# Patient Record
Sex: MALE | Race: WHITE | NOT HISPANIC OR LATINO | Employment: FULL TIME | URBAN - METROPOLITAN AREA
[De-identification: names, ages, dates, MRNs, and addresses within clinical notes are randomized per-mention and may not be internally consistent; named-entity substitution may affect disease eponyms.]

---

## 2017-02-06 ENCOUNTER — TELEPHONE (OUTPATIENT)
Dept: FAMILY MEDICINE | Facility: CLINIC | Age: 57
End: 2017-02-06

## 2017-02-06 NOTE — LETTER
[address] [address] [phone]  February 13, 2017    Da Mustafa  [address] [facility] [address]      Dear Da,    I care about your health and have reviewed your health plan. I have reviewed your medical conditions, medication list, and lab results and am making recommendations based on this review, to better manage your health.    You are in particular need of attention regarding:  -High Blood Pressure    I am recommending that you:  {recommendations:-schedule a NURSE-ONLY BLOOD PRESSURE APPOINTMENT within the next 1-4 weeks.    Here is a list of Health Maintenance topics that are due now or due soon:  Health Maintenance Due   Topic Date Due     HEPATITIS C SCREENING  09/02/1980     INFLUENZA VACCINE (SYSTEM ASSIGNED)  09/01/2016       Please call us at [phone] (or use [employer]) to address the above recommendations.     Thank you for trusting [facility] and we appreciate the opportunity to serve you.  We look forward to supporting your healthcare needs in the future.    Healthy Regards,    Robel Morris PA-C

## 2017-02-06 NOTE — TELEPHONE ENCOUNTER
Panel Management Review      Patient has the following on his problem list:     Hypertension   Last three blood pressure readings:  BP Readings from Last 3 Encounters:   09/06/16 151/76   10/26/15 132/80   07/27/15 146/98     Blood pressure: Failed    HTN Guidelines:  Age 18-59 BP range:  Less than 140/90  Age 60-85 with Diabetes:  Less than 140/90  Age 60-85 without Diabetes:  less than 150/90      Composite cancer screening  Chart review shows that this patient is due/due soon for the following None  Summary:    Patient is due/failing the following:   BP CHECK    Action needed:   Patient needs nurse only appointment.    Type of outreach:    Phone, left message for patient to call back.     Questions for provider review:    None                                                                                                                                    MIRTHA Crawford       Chart routed to Care Team .

## 2017-05-08 ENCOUNTER — TELEPHONE (OUTPATIENT)
Dept: FAMILY MEDICINE | Facility: CLINIC | Age: 57
End: 2017-05-08

## 2017-05-08 NOTE — TELEPHONE ENCOUNTER
Panel Management Review      Patient has the following on his problem list:     Hypertension   Last three blood pressure readings:  BP Readings from Last 3 Encounters:   09/06/16 151/76   10/26/15 132/80   07/27/15 (!) 146/98     Blood pressure: Passed    HTN Guidelines:  Age 18-59 BP range:  Less than 140/90  Age 60-85 with Diabetes:  Less than 140/90  Age 60-85 without Diabetes:  less than 150/90      Composite cancer screening  Chart review shows that this patient is due/due soon for the following None  Summary:    Patient is due/failing the following:   BP CHECK    Action needed:   Patient needs nurse only appointment.    Type of outreach:    Phone, spoke to patient.  pt states he will stop into phrmacy to have BP check as he is unable to schedule nurse only visit.     Questions for provider review:    None                                                                                                                                    MIRTHA Crawford       Chart routed to Care Team .

## 2017-08-06 DIAGNOSIS — J30.2 SEASONAL ALLERGIC RHINITIS: ICD-10-CM

## 2017-08-06 NOTE — TELEPHONE ENCOUNTER
Pending Prescriptions:                       Disp   Refills    fluticasone (FLONASE) 50 MCG/ACT spray [P*16 mL  3            Sig: SPRAY 2 SPRAYS INTO BOTH NOSTRILS DAILY               Last Written Prescription Date: 9/6/2016  Last Fill Quantity: 16g, # refills: 3    Last Office Visit with [facility], [facility] or [facility] prescribing provider:  9/6/2016Alexandra     Future Office Visit:       Date of Last Asthma Action Plan Letter:   There are no preventive care reminders to display for this patient.   Asthma Control Test: No flowsheet data found.    Date of Last Spirometry Test:   No results found for this or any previous visit.

## 2017-08-08 RX ORDER — FLUTICASONE PROPIONATE 50 MCG
SPRAY, SUSPENSION (ML) NASAL
Qty: 16 ML | Refills: 0 | Status: SHIPPED | OUTPATIENT
Start: 2017-08-08 | End: 2019-04-24

## 2017-08-08 NOTE — TELEPHONE ENCOUNTER
Prescription approved per [facility] Refill Protocol. Per Epic not covered, note to pharmacist:  If not covered, please send list of covered alternates.  Ezio Daigle RN

## 2017-09-11 DIAGNOSIS — I10 ESSENTIAL HYPERTENSION WITH GOAL BLOOD PRESSURE LESS THAN 140/90: ICD-10-CM

## 2017-09-11 NOTE — TELEPHONE ENCOUNTER
Pending Prescriptions:                       Disp   Refills    lisinopril (PRINIVIL/ZESTRIL) 40 MG table*90 tab*3            Sig: TAKE 1 TABLET (40 MG) BY MOUTH DAILY SIG PLEASE           SEE MD      LAST OFFICE VISIT WAS OVER A YEAR AGO 9/6/2016      Last Written Prescription Date: 9/6/2016  Last Fill Quantity: 90, # refills: 3  Last Office Visit with [facility], [facility] or [facility] prescribing provider: 9/6/2016, Alexandra       Potassium   Date Value Ref Range Status   09/06/2016 4.2 3.4 - 5.3 mmol/L Final     Creatinine   Date Value Ref Range Status   09/06/2016 0.81 0.66 - 1.25 mg/dL Final     BP Readings from Last 3 Encounters:   09/06/16 151/76   10/26/15 132/80   07/27/15 (!) 146/98

## 2017-09-13 RX ORDER — LISINOPRIL 40 MG/1
TABLET ORAL
Qty: 90 TABLET | Refills: 0 | Status: SHIPPED | OUTPATIENT
Start: 2017-09-13 | End: 2017-12-27

## 2017-12-21 DIAGNOSIS — I10 ESSENTIAL HYPERTENSION WITH GOAL BLOOD PRESSURE LESS THAN 140/90: ICD-10-CM

## 2017-12-21 NOTE — TELEPHONE ENCOUNTER
Requested Prescriptions   Pending Prescriptions Disp Refills     lisinopril (PRINIVIL/ZESTRIL) 40 MG tablet [Pharmacy Med Name: LISINOPRIL 40 MG TABLET] 90 tablet 0    Last Written Prescription Date:  9/13/17  Last Fill Quantity: 90,  # refills: 0   Last Office Visit with [facility], [facility] or [facility] prescribing provider:  9/6/2016   Future Office Visit:      Sig: TAKE 1 TABLET BY MOUTH DAILY . NEEDS APPOINTMENT FOR REFILL    ACE Inhibitors (Including Combos) Protocol Failed    12/21/2017  9:06 AM       Failed - Blood pressure under 140/90    BP Readings from Last 3 Encounters:   09/06/16 151/76   10/26/15 132/80   07/27/15 (!) 146/98                Failed - Recent or future visit with authorizing provider's specialty    Patient had office visit in the last year or has a visit in the next 30 days with authorizing provider.  See chart review.              Failed - Normal serum creatinine on file in past 12 months    Recent Labs   Lab Test  09/06/16   1010   CR  0.81            Failed - Normal serum potassium on file in past 12 months    Recent Labs   Lab Test  09/06/16   1010   POTASSIUM  4.2            Passed - Patient is age 18 or older

## 2017-12-22 RX ORDER — LISINOPRIL 40 MG/1
TABLET ORAL
Qty: 90 TABLET | Refills: 0 | OUTPATIENT
Start: 2017-12-22

## 2017-12-27 ENCOUNTER — RADIANT APPOINTMENT (OUTPATIENT)
Dept: GENERAL RADIOLOGY | Facility: CLINIC | Age: 57
End: 2017-12-27
Attending: PHYSICIAN ASSISTANT
Payer: COMMERCIAL

## 2017-12-27 ENCOUNTER — OFFICE VISIT (OUTPATIENT)
Dept: FAMILY MEDICINE | Facility: CLINIC | Age: 57
End: 2017-12-27
Payer: COMMERCIAL

## 2017-12-27 VITALS
RESPIRATION RATE: 18 BRPM | HEART RATE: 91 BPM | BODY MASS INDEX: 38.36 KG/M2 | HEIGHT: 76 IN | TEMPERATURE: 98.7 F | DIASTOLIC BLOOD PRESSURE: 86 MMHG | SYSTOLIC BLOOD PRESSURE: 136 MMHG | WEIGHT: 315 LBS

## 2017-12-27 DIAGNOSIS — Z00.00 VISIT FOR PREVENTIVE HEALTH EXAMINATION: Primary | ICD-10-CM

## 2017-12-27 DIAGNOSIS — M79.672 LEFT FOOT PAIN: ICD-10-CM

## 2017-12-27 DIAGNOSIS — I10 ESSENTIAL HYPERTENSION WITH GOAL BLOOD PRESSURE LESS THAN 140/90: ICD-10-CM

## 2017-12-27 DIAGNOSIS — Z12.5 SCREENING FOR PROSTATE CANCER: ICD-10-CM

## 2017-12-27 DIAGNOSIS — M19.072 ARTHRITIS OF LEFT FOOT: ICD-10-CM

## 2017-12-27 LAB
ERYTHROCYTE [DISTWIDTH] IN BLOOD BY AUTOMATED COUNT: 12.4 % (ref 10–15)
HCT VFR BLD AUTO: 41.9 % (ref 40–53)
HGB BLD-MCNC: 14.2 G/DL (ref 13.3–17.7)
MCH RBC QN AUTO: 30.5 PG (ref 26.5–33)
MCHC RBC AUTO-ENTMCNC: 33.9 G/DL (ref 31.5–36.5)
MCV RBC AUTO: 90 FL (ref 78–100)
PLATELET # BLD AUTO: 185 10E9/L (ref 150–450)
RBC # BLD AUTO: 4.65 10E12/L (ref 4.4–5.9)
WBC # BLD AUTO: 7.7 10E9/L (ref 4–11)

## 2017-12-27 PROCEDURE — 99396 PREV VISIT EST AGE 40-64: CPT | Performed by: PHYSICIAN ASSISTANT

## 2017-12-27 PROCEDURE — 36415 COLL VENOUS BLD VENIPUNCTURE: CPT | Performed by: PHYSICIAN ASSISTANT

## 2017-12-27 PROCEDURE — 80061 LIPID PANEL: CPT | Performed by: PHYSICIAN ASSISTANT

## 2017-12-27 PROCEDURE — 85027 COMPLETE CBC AUTOMATED: CPT | Performed by: PHYSICIAN ASSISTANT

## 2017-12-27 PROCEDURE — 80053 COMPREHEN METABOLIC PANEL: CPT | Performed by: PHYSICIAN ASSISTANT

## 2017-12-27 PROCEDURE — 73630 X-RAY EXAM OF FOOT: CPT | Mod: LT

## 2017-12-27 PROCEDURE — 73610 X-RAY EXAM OF ANKLE: CPT | Mod: LT

## 2017-12-27 PROCEDURE — G0103 PSA SCREENING: HCPCS | Performed by: PHYSICIAN ASSISTANT

## 2017-12-27 PROCEDURE — 99213 OFFICE O/P EST LOW 20 MIN: CPT | Mod: 25 | Performed by: PHYSICIAN ASSISTANT

## 2017-12-27 RX ORDER — LISINOPRIL 40 MG/1
TABLET ORAL
Qty: 90 TABLET | Refills: 3 | Status: SHIPPED | OUTPATIENT
Start: 2017-12-27 | End: 2019-01-13

## 2017-12-27 NOTE — NURSING NOTE
"Chief Complaint   Patient presents with     Physical       Initial /90 (BP Location: Right arm, Patient Position: Chair, Cuff Size: Adult Large)  Pulse 91  Temp 98.7  F (37.1  C) (Oral)  Resp 18  Ht 6' 3.75\" (1.924 m)  Wt (!) 439 lb (199.1 kg)  BMI 53.79 kg/m2 Estimated body mass index is 53.79 kg/(m^2) as calculated from the following:    Height as of this encounter: 6' 3.75\" (1.924 m).    Weight as of this encounter: 439 lb (199.1 kg).  Medication Reconciliation: complete    "

## 2017-12-27 NOTE — PROGRESS NOTES
SUBJECTIVE:   CC: Da Mustafa is an 57 year old male who presents for preventative health visit.     Physical   Annual:     Getting at least 3 servings of Calcium per day::  Yes    Bi-annual eye exam::  Yes    Dental care twice a year::  Yes    Sleep apnea or symptoms of sleep apnea::  Excessive snoring    Diet::  Low salt    Frequency of exercise::  1 day/week    Duration of exercise::  Less than 15 minutes    Taking medications regularly::  Yes    Medication side effects::  None    Additional concerns today::  YES              Joint Pain    Onset: months     Description:   Location: left ankle/foot  Character: Sharp and Dull ache    Intensity: mild    Progression of Symptoms: better    Accompanying Signs & Symptoms:  Other symptoms: worse in AM. Improves with movement.     History:   Previous similar pain: no       Precipitating factors:   Trauma or overuse: no     Alleviating factors:  Improved by: exercises    Therapies Tried and outcome: none         Left ankle pain x several months, comes and goes      Today's PHQ-2 Score:   PHQ-2 ( 1999 [employer]) 12/27/2017   Q1: Little interest or pleasure in doing things 0   Q2: Feeling down, depressed or hopeless 0   PHQ-2 Score 0   Q1: Little interest or pleasure in doing things Not at all   Q2: Feeling down, depressed or hopeless Not at all   PHQ-2 Score 0       Abuse: Current or Past(Physical, Sexual or Emotional)- No  Do you feel safe in your environment - Yes    Social History   Substance Use Topics     Smoking status: Never Smoker     Smokeless tobacco: Never Used     Alcohol use Yes      Comment: rare     Alcohol Use 12/27/2017   If you drink alcohol, do you typically have greater than 3 drinks per day OR greater than 7 drinks per week?   No   No flowsheet data found.      Last PSA: No results found for: PSA    Reviewed orders with patient. Reviewed health maintenance and updated orders accordingly - Yes  BP Readings from Last 3 Encounters:   12/27/17 136/86    09/06/16 151/76   10/26/15 132/80    Wt Readings from Last 3 Encounters:   12/27/17 (!) 439 lb (199.1 kg)   09/06/16 (!) 456 lb (206.8 kg)   07/27/15 (!) 460 lb (208.7 kg)                  Patient Active Problem List   Diagnosis     Edema     Family history of diabetes mellitus     Seasonal allergic rhinitis     Hyperlipidemia LDL goal <160     Essential hypertension with goal blood pressure less than 140/90     Arthritis of left foot     Class 3 obesity due to excess calories without serious comorbidity with body mass index (BMI) of 50.0 to 59.9 in adult (H)     Past Surgical History:   Procedure Laterality Date     C NONSPECIFIC PROCEDURE       COLONOSCOPY  12/4/2013    Procedure: COLONOSCOPY;  Colonoscopy  ;  Surgeon: Beny Rich MD;  Location:  GI       Social History   Substance Use Topics     Smoking status: Never Smoker     Smokeless tobacco: Never Used     Alcohol use Yes      Comment: rare     Family History   Problem Relation Age of Onset     CANCER Father          Current Outpatient Prescriptions   Medication Sig Dispense Refill     lisinopril (PRINIVIL/ZESTRIL) 40 MG tablet TAKE 1 TABLET (40 MG) BY MOUTH DAILY SIG PLEASE SEE MD 90 tablet 3     fluticasone (FLONASE) 50 MCG/ACT spray SPRAY 2 SPRAYS INTO BOTH NOSTRILS DAILY 16 mL 0     ORDER FOR DME 1 Units. Extra-large mask with appropos tubing (if needed) for pt use in CPAP machine 1 Units 0     ORDER FOR DME Jobst knee high compression stocking (Cat# [id number])    Flat calf black; Open Toe; XL    30-40mmHg compression (extra firm) 2 each 1     CLARITIN 10 MG OR TABS ONE DAILY 3 MONTHS 3     [DISCONTINUED] lisinopril (PRINIVIL/ZESTRIL) 40 MG tablet TAKE 1 TABLET (40 MG) BY MOUTH DAILY SIG PLEASE SEE MD 90 tablet 0     Allergies   Allergen Reactions     No Known Drug Allergies            Reviewed and updated as needed this visit by clinical staffTobacco  Allergies  Meds  Problems  Med Hx  Surg Hx  Fam Hx  Soc Hx          Reviewed and updated  "as needed this visit by Provider  Allergies  Meds  Problems        Past Medical History:   Diagnosis Date     Class 3 obesity due to excess calories without serious comorbidity with body mass index (BMI) of 50.0 to 59.9 in adult (H) 12/27/2017     Edema      Essential hypertension with goal blood pressure less than 140/90 7/25/2016     Essential hypertension, benign      Hyperlipidemia LDL goal <160 7/28/2015     Hypertension goal BP (blood pressure) < 140/90 4/25/2013     Obesity, unspecified      Seasonal allergic rhinitis 5/19/2014     Sleep apnea       Past Surgical History:   Procedure Laterality Date     C NONSPECIFIC PROCEDURE       COLONOSCOPY  12/4/2013    Procedure: COLONOSCOPY;  Colonoscopy  ;  Surgeon: Beny Rich MD;  Location:  GI       Review of Systems:   C: NEGATIVE for fever, chills, change in weight  I: NEGATIVE for worrisome rashes, moles or lesions  E: NEGATIVE for vision changes or irritation  ENT: NEGATIVE for ear, mouth and throat problems  R: NEGATIVE for significant cough or SOB  CV: NEGATIVE for chest pain, palpitations or peripheral edema  GI: NEGATIVE for nausea, abdominal pain, heartburn, or change in bowel habits   male: negative for dysuria, hematuria, decreased urinary stream, erectile dysfunction, urethral discharge  M: NEGATIVE for significant arthralgias or myalgia  N: NEGATIVE for weakness, dizziness or paresthesias  P: NEGATIVE for changes in mood or affect    OBJECTIVE:   /86  Pulse 91  Temp 98.7  F (37.1  C) (Oral)  Resp 18  Ht 6' 3.75\" (1.924 m)  Wt (!) 439 lb (199.1 kg)  BMI 53.79 kg/m2    Physical Exam  GENERAL APPEARANCE: alert, no distress and obese  EYES: Eyes grossly normal to inspection, PERRL and conjunctivae and sclerae normal  HENT: ear canals and TM's normal and nose and mouth without ulcers or lesions  NECK: no adenopathy, no asymmetry, masses, or scars and thyroid normal to palpation  RESP: lungs clear to auscultation - no rales, rhonchi " or wheezes  CV: regular rates and rhythm, normal S1 S2, no S3 or S4 and no murmur, click or rub  LYMPHATICS: normal ant/post cervical and supraclavicular nodes  ABDOMEN: soft, nontender, without hepatosplenomegaly or masses and bowel sounds normal  MS: extremities normal- no gross deformities noted    L ANKLE  Inspection:Swelling:diffuse   Tender:none   Non-tender:ATFL, lateral malleolus, medial malleolus, deltoid ligament, anterior tib-fib ligament, achilles tendon, distal 3rd fibula shaft, mid-fibula shaft, proximal fibula, distal tibia, 5th metatarsal base  Range of Motion:dorsiflexion:  full, plantarflexion:  full, inversion:  full, eversion:  full  Strength:dorsiflexion:  5/5, plantarflexion:  5/5, inversion: 5/5, eversion:5/5  Special tests:negative anterior drawer, negative talar tilt, negative Lancaster sign    L FOOT  foot exam : Inspection Palpation:   Swelling: medial swelling  Tender::peroneal tendon:  at cuboid  Non-tender:calcaneous , navicular, cuneiform lateral, cuneiform middle, cuneiform medial , metatarsal heads  Range of Motion:flexion of toes:  full, extension of toes  full    SKIN: no suspicious lesions or rashes  NEURO: Normal strength and tone, mentation intact and speech normal  PSYCH: mentation appears normal and affect normal/bright    XRAY right foot and ankle: arthritic changes noted at navicular-cuniform junction. Pending radiology review.     ASSESSMENT/PLAN:   (Z00.00) Visit for preventive health examination  (primary encounter diagnosis)  Comment:   Plan: Lipid panel reflex to direct LDL Fasting,         Comprehensive metabolic panel (BMP + Alb, Alk         Phos, ALT, AST, Total. Bili, TP), CBC with         platelets            (M19.072) Arthritis of left foot  Comment: evident on radiographs and history. Discussed weight reduction and treatment of choice. See below. If worsening, would recommend follow up with Dr. Patricia.   Plan: XR Ankle Left G/E 3 Views, XR Foot Left G/E 3          "Views, OFFICE/OUTPT VISIT,EST,LEVL III            (E66.01,  Z68.43) Class 3 obesity due to excess calories without serious comorbidity with body mass index (BMI) of 50.0 to 59.9 in adult (H)  Comment: discussed importance of weight loss. Options discussed are myfitness pal with nutritionist and bariatric surgery. Will first have see nutritionist.   Plan: NUTRITION REFERRAL            (I10) Essential hypertension with goal blood pressure less than 140/90  Comment: stable   Plan: lisinopril (PRINIVIL/ZESTRIL) 40 MG tablet            (Z12.5) Screening for prostate cancer  Comment:   Plan: Prostate spec antigen screen              COUNSELING:   Reviewed preventive health counseling, as reflected in patient instructions       Regular exercise       Healthy diet/nutrition       Prostate cancer screening           reports that he has never smoked. He has never used smokeless tobacco.      Estimated body mass index is 53.79 kg/(m^2) as calculated from the following:    Height as of this encounter: 6' 3.75\" (1.924 m).    Weight as of this encounter: 439 lb (199.1 kg).   Weight management plan: Patient referred to endocrine and/or weight management specialty    Counseling Resources:  ATP IV Guidelines  Pooled Cohorts Equation Calculator  FRAX Risk Assessment  ICSI Preventive Guidelines  Dietary Guidelines for Americans, 2010  USDA's MyPlate  ASA Prophylaxis  Lung CA Screening    Robel Morris PA-C  [facility] for HPI/ROS submitted by the patient on 12/27/2017   PHQ-2 Score: 0    "

## 2017-12-27 NOTE — MR AVS SNAPSHOT
After Visit Summary   12/27/2017    Da Mustafa    MRN: [id number]           Patient Information     Date Of Birth          1960        Visit Information        Provider Department      12/27/2017 8:30 AM Robel Morris PA-C [facility]        Today's Diagnoses     Arthritis of left foot    -  1    Class 3 obesity due to excess calories without serious comorbidity with body mass index (BMI) of 50.0 to 59.9 in adult (H)        Screening for prostate cancer        Visit for preventive health examination        Essential hypertension with goal blood pressure less than 140/90           Follow-ups after your visit        Additional Services     NUTRITION REFERRAL       Your provider has referred you to: [facility]: [facility] [phone]   [email or web address]    Please be aware that coverage of these services is subject to the terms and limitations of your health insurance plan.  Call member services at your health plan with any benefit or coverage questions.      Please bring the following with you to your appointment:    (1) This referral request  (2) Any documents given to you regarding this referral  (3) Any specific questions you have about diet and/or food choices                  Who to contact     If you have questions or need follow up information about today's clinic visit or your schedule please contact [facility] directly at [phone].  Normal or non-critical lab and imaging results will be communicated to you by MyChart, letter or phone within 4 business days after the clinic has received the results. If you do not hear from us within 7 days, please contact the clinic through MyChart or phone. If you have a critical or abnormal lab result, we will notify you by phone as soon as possible.  Submit refill requests through [employer] or call your pharmacy and they will forward the refill request  "to us. Please allow 3 business days for your refill to be completed.          Additional Information About Your Visit        [employer] Information     [employer] gives you secure access to your electronic health record. If you see a primary care provider, you can also send messages to your care team and make appointments. If you have questions, please call your primary care clinic.  If you do not have a primary care provider, please call [phone] and they will assist you.        Care EveryWhere ID     This is your Care EveryWhere ID. This could be used by other organizations to access your [facility] medical records  [id number]        Your Vitals Were     Pulse Temperature Respirations Height BMI (Body Mass Index)       91 98.7  F (37.1  C) (Oral) 18 6' 3.75\" (1.924 m) 53.79 kg/m2        Blood Pressure from Last 3 Encounters:   12/27/17 136/86   09/06/16 151/76   10/26/15 132/80    Weight from Last 3 Encounters:   12/27/17 (!) 439 lb (199.1 kg)   09/06/16 (!) 456 lb (206.8 kg)   07/27/15 (!) 460 lb (208.7 kg)              We Performed the Following     CBC with platelets     Comprehensive metabolic panel (BMP + Alb, Alk Phos, ALT, AST, Total. Bili, TP)     Lipid panel reflex to direct LDL Fasting     NUTRITION REFERRAL     Prostate spec antigen screen          Today's Medication Changes          These changes are accurate as of: 12/27/17  9:47 AM.  If you have any questions, ask your nurse or doctor.               These medicines have changed or have updated prescriptions.        Dose/Directions    lisinopril 40 MG tablet   Commonly known as:  PRINIVIL/ZESTRIL   This may have changed:  See the new instructions.   Used for:  Essential hypertension with goal blood pressure less than 140/90   Changed by:  Robel Morris PA-C        TAKE 1 TABLET (40 MG) BY MOUTH DAILY SIG PLEASE SEE MD   Quantity:  90 tablet   Refills:  3            Where to get your medicines      These medications [facility] to [id number] - [place], [place] - [address] [facility] [place]  [address] [place] [address] RD, [place] [address]     Phone:  [phone]     lisinopril 40 MG tablet                Primary Care Provider Office Phone # Fax #    Lex Castro [phone] [address] [facility] [address]        Equal Access to Services     OLIVER VARGHESE : Hadii aad ku hadasho Soomaali, waaxda luqadaha, qaybta kaalmada adeegyada, waxay idiin hayaan adeeg kharash la'yoly . So [facility] [phone].    ATENCIÓN: Si tish gillette, tiene a pitts disposición servicios gratuitos de asistencia lingüística. Llame al [phone].    We comply with applicable federal civil rights laws and [place] laws. We do not discriminate on the basis of race, color, national origin, age, disability, sex, sexual orientation, or gender identity.            Thank you!     Thank you for choosing [facility]  for your care. Our goal is always to provide you with excellent care. Hearing back from our patients is one way we can continue to improve our services. Please take a few minutes to complete the written survey that you may receive in the mail after your visit with us. Thank you!             Your Updated Medication List - Protect others around you: Learn how to safely use, store and throw away your medicines at www.disposemymeds.org.          This list is accurate as of: 12/27/17  9:47 AM.  Always use your most recent med list.                   Brand Name Dispense Instructions for use Diagnosis    CLARITIN 10 MG tablet   Generic drug:  loratadine     3 MONTHS    ONE DAILY    Unspecified essential hypertension, Chronic allergic conjunctivitis, Routine medical exam, Mixed hyperlipidemia       fluticasone 50 MCG/ACT spray    FLONASE    16 mL    SPRAY 2 SPRAYS INTO BOTH NOSTRILS DAILY    Seasonal allergic rhinitis       lisinopril 40 MG tablet    PRINIVIL/ZESTRIL    90 tablet    TAKE 1 TABLET (40 MG) BY MOUTH DAILY SIG  PLEASE SEE MD    Essential hypertension with goal blood pressure less than 140/90       * order for DME     2 each    Jobst knee high compression stocking (Cat# [id number])  Flat calf black; Open Toe; XL  30-40mmHg compression (extra firm)    Edema       * order for DME     1 Units    1 Units. Extra-large mask with appropos tubing (if needed) for pt use in CPAP machine    MARLY (obstructive sleep apnea)       * Notice:  This list has 2 medication(s) that are the same as other medications prescribed for you. Read the directions carefully, and ask your doctor or other care provider to review them with you.

## 2017-12-28 LAB
ALBUMIN SERPL-MCNC: 3.5 G/DL (ref 3.4–5)
ALP SERPL-CCNC: 91 U/L (ref 40–150)
ALT SERPL W P-5'-P-CCNC: 24 U/L (ref 0–70)
ANION GAP SERPL CALCULATED.3IONS-SCNC: 5 MMOL/L (ref 3–14)
AST SERPL W P-5'-P-CCNC: 18 U/L (ref 0–45)
BILIRUB SERPL-MCNC: 0.4 MG/DL (ref 0.2–1.3)
BUN SERPL-MCNC: 15 MG/DL (ref 7–30)
CALCIUM SERPL-MCNC: 8.7 MG/DL (ref 8.5–10.1)
CHLORIDE SERPL-SCNC: 103 MMOL/L (ref 94–109)
CHOLEST SERPL-MCNC: 149 MG/DL
CO2 SERPL-SCNC: 30 MMOL/L (ref 20–32)
CREAT SERPL-MCNC: 0.68 MG/DL (ref 0.66–1.25)
GFR SERPL CREATININE-BSD FRML MDRD: >90 ML/MIN/1.7M2
GLUCOSE SERPL-MCNC: 94 MG/DL (ref 70–99)
HDLC SERPL-MCNC: 41 MG/DL
LDLC SERPL CALC-MCNC: 79 MG/DL
NONHDLC SERPL-MCNC: 108 MG/DL
POTASSIUM SERPL-SCNC: 4.1 MMOL/L (ref 3.4–5.3)
PROT SERPL-MCNC: 6.9 G/DL (ref 6.8–8.8)
PSA SERPL-ACNC: 0.75 UG/L (ref 0–4)
SODIUM SERPL-SCNC: 138 MMOL/L (ref 133–144)
TRIGL SERPL-MCNC: 146 MG/DL

## 2018-07-03 ENCOUNTER — OFFICE VISIT (OUTPATIENT)
Dept: FAMILY MEDICINE | Facility: CLINIC | Age: 58
End: 2018-07-03
Payer: COMMERCIAL

## 2018-07-03 VITALS
RESPIRATION RATE: 18 BRPM | SYSTOLIC BLOOD PRESSURE: 156 MMHG | WEIGHT: 315 LBS | DIASTOLIC BLOOD PRESSURE: 80 MMHG | HEART RATE: 76 BPM | BODY MASS INDEX: 58.81 KG/M2 | TEMPERATURE: 98.2 F

## 2018-07-03 DIAGNOSIS — R60.0 PERIPHERAL EDEMA: ICD-10-CM

## 2018-07-03 DIAGNOSIS — I10 ESSENTIAL HYPERTENSION WITH GOAL BLOOD PRESSURE LESS THAN 140/90: ICD-10-CM

## 2018-07-03 DIAGNOSIS — I87.2 VENOUS STASIS DERMATITIS OF BOTH LOWER EXTREMITIES: ICD-10-CM

## 2018-07-03 DIAGNOSIS — I89.0 LYMPHEDEMA: Primary | ICD-10-CM

## 2018-07-03 LAB
ANION GAP SERPL CALCULATED.3IONS-SCNC: 7 MMOL/L (ref 3–14)
BUN SERPL-MCNC: 20 MG/DL (ref 7–30)
CALCIUM SERPL-MCNC: 8.8 MG/DL (ref 8.5–10.1)
CHLORIDE SERPL-SCNC: 105 MMOL/L (ref 94–109)
CO2 SERPL-SCNC: 30 MMOL/L (ref 20–32)
CREAT SERPL-MCNC: 0.84 MG/DL (ref 0.66–1.25)
GFR SERPL CREATININE-BSD FRML MDRD: >90 ML/MIN/1.7M2
GLUCOSE SERPL-MCNC: 98 MG/DL (ref 70–99)
HBA1C MFR BLD: 5.5 % (ref 0–5.6)
POTASSIUM SERPL-SCNC: 4.4 MMOL/L (ref 3.4–5.3)
SODIUM SERPL-SCNC: 142 MMOL/L (ref 133–144)

## 2018-07-03 PROCEDURE — 99214 OFFICE O/P EST MOD 30 MIN: CPT | Performed by: PHYSICIAN ASSISTANT

## 2018-07-03 PROCEDURE — 83036 HEMOGLOBIN GLYCOSYLATED A1C: CPT | Performed by: PHYSICIAN ASSISTANT

## 2018-07-03 PROCEDURE — 80048 BASIC METABOLIC PNL TOTAL CA: CPT | Performed by: PHYSICIAN ASSISTANT

## 2018-07-03 PROCEDURE — 87186 SC STD MICRODIL/AGAR DIL: CPT | Performed by: PHYSICIAN ASSISTANT

## 2018-07-03 PROCEDURE — 36415 COLL VENOUS BLD VENIPUNCTURE: CPT | Performed by: PHYSICIAN ASSISTANT

## 2018-07-03 PROCEDURE — 87070 CULTURE OTHR SPECIMN AEROBIC: CPT | Performed by: PHYSICIAN ASSISTANT

## 2018-07-03 PROCEDURE — 87077 CULTURE AEROBIC IDENTIFY: CPT | Performed by: PHYSICIAN ASSISTANT

## 2018-07-03 RX ORDER — SILVER SULFADIAZINE 10 MG/G
CREAM TOPICAL 2 TIMES DAILY
Qty: 85 G | Refills: 1 | Status: SHIPPED | OUTPATIENT
Start: 2018-07-03 | End: 2019-08-27

## 2018-07-03 NOTE — MR AVS SNAPSHOT
"              After Visit Summary   7/3/2018    Da Mustafa    MRN: [id number]           Patient Information     Date Of Birth          1960        Visit Information        Provider Department      7/3/2018 9:15 AM Robel Morris PA-C [facility]        Today's Diagnoses     Essential hypertension with goal blood pressure less than 140/90    -  1    Class 3 obesity due to excess calories without serious comorbidity with body mass index (BMI) of 50.0 to 59.9 in adult (H)        Lymphedema        Peripheral edema           Follow-ups after your visit        Additional Services     LYMPHEDEMA THERAPY REFERRAL       *This therapy referral will be filtered to a centralized scheduling office at [facility] and the patient will receive a call to schedule an appointment at a [place] location most convenient for them. *   If you have not heard from the scheduling office within 2 business days, please call [phone] for all locations, with the exception of [place], please call [phone].     Treatment: PT or OT Evaluation & Treatment  Special Instructions:   PT/OT Treatment Diagnosis: Lymphedema and Wound(s)    Please be aware that coverage of these services is subject to the terms and limitations of your health insurance plan.  Call member services at your health plan with any benefit or coverage questions.      **Note to Provider:  If you are referring outside of [place] for the therapy appointment, please list the name of the location in the \"special instructions\" above, print the referral and give to the patient to schedule the appointment.            NUTRITION REFERRAL       Your provider has referred you to: [facility]: [facility] [phone]   [email or web address]    Please be aware that coverage of these services is subject to the terms and limitations of your health insurance plan.  Call member services at " your health plan with any benefit or coverage questions.      Please bring the following with you to your appointment:    (1) This referral request  (2) Any documents given to you regarding this referral  (3) Any specific questions you have about diet and/or food choices                  Follow-up notes from your care team     Return in about 6 months (around 1/3/2019).      Who to contact     If you have questions or need follow up information about today's clinic visit or your schedule please contact [facility] directly at [phone].  Normal or non-critical lab and imaging results will be communicated to you by Kabbeehart, letter or phone within 4 business days after the clinic has received the results. If you do not hear from us within 7 days, please contact the clinic through Ezuzat or phone. If you have a critical or abnormal lab result, we will notify you by phone as soon as possible.  Submit refill requests through AdviseHub or call your pharmacy and they will forward the refill request to us. Please allow 3 business days for your refill to be completed.          Additional Information About Your Visit        Kabbeehart Information     AdviseHub gives you secure access to your electronic health record. If you see a primary care provider, you can also send messages to your care team and make appointments. If you have questions, please call your primary care clinic.  If you do not have a primary care provider, please call [phone] and they will assist you.        Care EveryWhere ID     This is your Care EveryWhere ID. This could be used by other organizations to access your [place] medical records  [id number]        Your Vitals Were     Pulse Temperature Respirations BMI (Body Mass Index)          76 98.2  F (36.8  C) (Oral) 18 58.81 kg/m2         Blood Pressure from Last 3 Encounters:   07/03/18 169/83   12/27/17 136/86   09/06/16 151/76    Weight from Last 3 Encounters:   07/03/18 (!) 480  lb (217.7 kg)   12/27/17 (!) 439 lb (199.1 kg)   09/06/16 (!) 456 lb (206.8 kg)              We Performed the Following     Basic metabolic panel     Hemoglobin A1c     LYMPHEDEMA THERAPY REFERRAL     NUTRITION REFERRAL          Today's Medication Changes          These changes are accurate as of 7/3/18 10:02 AM.  If you have any questions, ask your nurse or doctor.               Start taking these medicines.        Dose/Directions    silver sulfADIAZINE 1 % cream   Commonly known as:  SILVADENE   Used for:  Lymphedema, Peripheral edema   Started by:  Robel Morris PA-C        Apply topically 2 times daily for 7 days   Quantity:  85 g   Refills:  1         Stop taking these medicines if you haven't already. Please contact your care team if you have questions.     CLARITIN 10 MG tablet   Generic drug:  loratadine   Stopped by:  Robel Morris PA-C           order for DME   Stopped by:  Robel Morris PA-C                Where to get your medicines      These medications were sent to [facility]/pharmacy #[id number] - [place], [place] - [facility] AT [address], [place] [address]     Phone:  [phone]     silver sulfADIAZINE 1 % cream                Primary Care Provider Office Phone # Fax #    Lex Castro [phone] [address] [facility] [address]        Equal Access to Services     [facility]: Hadii moncho crandall hadasho Soayesha, waaxda luqadaha, qaybta kaalmada aderahyada, zulma whiting . So [facility] [phone].    ATENCIÓN: Si habla español, tiene a pitts disposición servicios gratuitos de asistencia lingüística. Alice al [phone].    We comply with applicable federal civil rights laws and [place] laws. We do not discriminate on the basis of race, color, national origin, age, disability, sex, sexual orientation, or gender identity.            Thank you!     Thank you for choosing [facility]  [facility]  for your care. Our goal is always to provide you with excellent care. Hearing back from our patients is one way we can continue to improve our services. Please take a few minutes to complete the written survey that you may receive in the mail after your visit with us. Thank you!             Your Updated Medication List - Protect others around you: Learn how to safely use, store and throw away your medicines at www.disposemymeds.org.          This list is accurate as of 7/3/18 10:02 AM.  Always use your most recent med list.                   Brand Name Dispense Instructions for use Diagnosis    fluticasone 50 MCG/ACT spray    FLONASE    16 mL    SPRAY 2 SPRAYS INTO BOTH NOSTRILS DAILY    Seasonal allergic rhinitis       lisinopril 40 MG tablet    PRINIVIL/ZESTRIL    90 tablet    TAKE 1 TABLET (40 MG) BY MOUTH DAILY SIG PLEASE SEE MD    Essential hypertension with goal blood pressure less than 140/90       silver sulfADIAZINE 1 % cream    SILVADENE    85 g    Apply topically 2 times daily for 7 days    Lymphedema, Peripheral edema

## 2018-07-03 NOTE — PROGRESS NOTES
SUBJECTIVE:   Da Mustafa is a 57 year old male who presents to clinic today for the following health issues:      -left leg swelling, draining, patient wears compression stockings daily for months. Worsening. No other treatments used. Has noticed continued weight gain. No shortness of breath or chest pains. No changes in urination. CMP in December normal. Echocardiogram in 2006 was normal, but limited due to obesity. [facility] has had success with calorie counting in the past, but difficult to maintain.     Patient has history of htn. Used to take prinzide 20-12.5 bid. Stopped for lisinopril 40 mg daily.     -patient states injured left leg years ago when patient fell through [facility] [place] skin has been very sensitive since    Rash  Onset: x 9 days    Description:   Location: bilateral leg rash  Character: redness  Itching (Pruritis): no    Progression of Symptoms:  worsening    Accompanying Signs & Symptoms:  Fever: no   Body aches or joint pain: no   Sore throat symptoms: no   Recent cold symptoms: no     History:   Previous similar rash: no     Precipitating factors:   Exposure to similar rash: no   New exposures: None   Recent travel: no     Therapies Tried and outcome: none      Problem list and histories reviewed & adjusted, as indicated.  Additional history: as documented    Patient Active Problem List   Diagnosis     Edema     Family history of diabetes mellitus     Seasonal allergic rhinitis     Hyperlipidemia LDL goal <160     Essential hypertension with goal blood pressure less than 140/90     Arthritis of left foot     Class 3 obesity due to excess calories without serious comorbidity with body mass index (BMI) of 50.0 to 59.9 in adult (H)     Venous stasis dermatitis of both lower extremities     Peripheral edema     Lymphedema     Past Surgical History:   Procedure Laterality Date     C NONSPECIFIC PROCEDURE       COLONOSCOPY  12/4/2013    Procedure: COLONOSCOPY;  Colonoscopy  ;  Surgeon:  Beny Rich MD;  Location:  GI       Social History   Substance Use Topics     Smoking status: Never Smoker     Smokeless tobacco: Never Used     Alcohol use Yes      Comment: rare     Family History   Problem Relation Age of Onset     Cancer Father          Current Outpatient Prescriptions   Medication Sig Dispense Refill     fluticasone (FLONASE) 50 MCG/ACT spray SPRAY 2 SPRAYS INTO BOTH NOSTRILS DAILY 16 mL 0     lisinopril (PRINIVIL/ZESTRIL) 40 MG tablet TAKE 1 TABLET (40 MG) BY MOUTH DAILY SIG PLEASE SEE MD 90 tablet 3     silver sulfADIAZINE (SILVADENE) 1 % cream Apply topically 2 times daily for 7 days 85 g 1     Allergies   Allergen Reactions     No Known Drug Allergies      BP Readings from Last 3 Encounters:   07/03/18 156/80   12/27/17 136/86   09/06/16 151/76    Wt Readings from Last 3 Encounters:   07/03/18 (!) 480 lb (217.7 kg)   12/27/17 (!) 439 lb (199.1 kg)   09/06/16 (!) 456 lb (206.8 kg)                    Reviewed and updated as needed this visit by clinical staff  Tobacco  Allergies  Meds  Problems  Med Hx  Surg Hx  Fam Hx  Soc Hx        Reviewed and updated as needed this visit by Provider  Allergies  Meds  Problems         ROS:  Constitutional, skin, neuro, endocrine, cardiovascular, pulmonary, gi and gu systems are negative, except as otherwise noted.    OBJECTIVE:     /80  Pulse 76  Temp 98.2  F (36.8  C) (Oral)  Resp 18  Wt (!) 480 lb (217.7 kg)  BMI 58.81 kg/m2  Body mass index is 58.81 kg/(m^2).  GENERAL: alert, no distress and obese  HENT: ear canals and TM's normal, nose and mouth without ulcers or lesions  RESP: lungs clear to auscultation - no rales, rhonchi or wheezes  CV: regular rates and rhythm, normal S1 S2, no S3 or S4, no murmur, click or rub and 1+ bilateral lower extremity pitting edema to knee. Lymphedema also present.     SKIN: erythematous macular, weeping rash noted on anterior lower left leg without warmth or tenderness to palpation.    PSYCH: mentation appears normal, affect normal/bright    Diagnostic Test Results:  none     ASSESSMENT/PLAN:     (I89.0) Lymphedema  (primary encounter diagnosis)  Comment: present in combination with peripheral edema. CMP was normal 7 months ago and cardio pulm exam normal. Likely related to weight. Discussed weight management in detail and discuss bariatric surgery option. Patient is not interested in this at this time. Given past weight loss with diet, will have see nutritionist. In terms of edema, will have see lymphedema clinic. Erythema does not appear as significant cellulitis but wound culture pending. Recommending sivadene with bandage changes and continued compression stockings until lymphedema evaluation.   Plan: LYMPHEDEMA THERAPY REFERRAL, silver         sulfADIAZINE (SILVADENE) 1 % cream, Wound         Culture Aerobic Bacterial        -Medication use and side effects discussed with the patient. Patient is in complete understanding and agreement with plan.       (R60.9) Peripheral edema  Comment: as above   Plan: LYMPHEDEMA THERAPY REFERRAL, silver         sulfADIAZINE (SILVADENE) 1 % cream, Wound         Culture Aerobic Bacterial            (I87.2) Venous stasis dermatitis of both lower extremities  Comment: noted on exam. Discuss etiology and management of underlying edema as abov.e   Plan:     (I10) Essential hypertension with goal blood pressure less than 140/90  Comment: history of this. Not well controlled today. May be due to increased weight. With edema, lasix may be added on. Did not have improved htn with hctz in the past. Pending bmp, will likely start at 40 mg daily with close follow up.   Plan: Basic metabolic panel, Hemoglobin A1c,         NUTRITION REFERRAL            (E66.01,  Z68.43) Class 3 obesity due to excess calories without serious comorbidity with body mass index (BMI) of 50.0 to 59.9 in adult (H)  Comment: see lymphedema above.   Plan: Basic metabolic panel, Hemoglobin A1c,          NUTRITION REFERRAL              Follow up: pending labs. Likely 2 weeks for bp and bmp check as nurse only lab only.     Robel Morris PA-C  [facility]

## 2018-07-06 ENCOUNTER — TELEPHONE (OUTPATIENT)
Dept: FAMILY MEDICINE | Facility: CLINIC | Age: 58
End: 2018-07-06

## 2018-07-06 DIAGNOSIS — L03.116 CELLULITIS OF LEFT LOWER EXTREMITY: Primary | ICD-10-CM

## 2018-07-06 DIAGNOSIS — R60.0 PERIPHERAL EDEMA: Primary | ICD-10-CM

## 2018-07-06 DIAGNOSIS — I10 ESSENTIAL HYPERTENSION WITH GOAL BLOOD PRESSURE LESS THAN 140/90: ICD-10-CM

## 2018-07-06 RX ORDER — SULFAMETHOXAZOLE/TRIMETHOPRIM 800-160 MG
1 TABLET ORAL 2 TIMES DAILY
Qty: 14 TABLET | Refills: 0 | Status: SHIPPED | OUTPATIENT
Start: 2018-07-06 | End: 2019-04-24

## 2018-07-06 RX ORDER — FUROSEMIDE 40 MG
40 TABLET ORAL DAILY
Qty: 30 TABLET | Refills: 1 | Status: SHIPPED | OUTPATIENT
Start: 2018-07-06 | End: 2018-09-04

## 2018-07-06 RX ORDER — CEFUROXIME AXETIL 500 MG/1
500 TABLET ORAL 2 TIMES DAILY
Qty: 14 TABLET | Refills: 0 | Status: SHIPPED | OUTPATIENT
Start: 2018-07-06 | End: 2018-07-13

## 2018-07-06 NOTE — TELEPHONE ENCOUNTER
Patient advised, verbalized understanding, will  anti biotics, had questions about diuretic, Monico Morris over herd conversation, will reply to patient via MyChart  Mike Vogel MA

## 2018-07-06 NOTE — TELEPHONE ENCOUNTER
Please call patient. His wound culture is still pending, but many results have returned showing multiple bacteria growing. I have sent over 2 antibiotics to cover the known bacteria present. This will be bactrim and ceftin. Both will be taken twice daily for 7 days. If any worsening of symptoms, patient to RTC. I have sent these to his pharmacy.     -Monico Morris, PAC

## 2018-07-07 LAB
BACTERIA SPEC CULT: ABNORMAL
Lab: ABNORMAL
SPECIMEN SOURCE: ABNORMAL

## 2018-07-10 ENCOUNTER — MYC MEDICAL ADVICE (OUTPATIENT)
Dept: FAMILY MEDICINE | Facility: CLINIC | Age: 58
End: 2018-07-10

## 2018-07-10 ENCOUNTER — HOSPITAL ENCOUNTER (OUTPATIENT)
Dept: OCCUPATIONAL THERAPY | Facility: CLINIC | Age: 58
Setting detail: THERAPIES SERIES
End: 2018-07-10
Attending: PHYSICIAN ASSISTANT
Payer: COMMERCIAL

## 2018-07-10 DIAGNOSIS — I89.0 LYMPHEDEMA: Primary | ICD-10-CM

## 2018-07-10 PROCEDURE — 97165 OT EVAL LOW COMPLEX 30 MIN: CPT | Mod: GO

## 2018-07-10 PROCEDURE — 40000445 ZZHC STATISTIC OT VISIT, LYMPHEDEMA

## 2018-07-10 PROCEDURE — 97535 SELF CARE MNGMENT TRAINING: CPT | Mod: GO

## 2018-07-10 NOTE — PROGRESS NOTES
07/10/18 1100   Rehab Discipline   Discipline OT   Type of Visit   Type of visit Initial Edema Evaluation   General Information   Start of care 07/10/18   Referring physician Monico Morris   Orders Evaluate and treat as indicated   Order date 07/03/18   Medical diagnosis lymphedema; wounds   Onset of illness / date of surgery 07/03/18   Edema onset 07/03/18   Affected body parts LLE;RLE   Edema etiology Infection;Trauma  (venous stasis)   Edema etiology comments Pt reports history LLE swelling since he stepped through weak point in roof and endured trauma injury to LLE. Pt has no reason for RLE swelling/ Pt has history cellulitis infections in LE's and is obese, challenging vein function in BLE's   Pertinent history of current problem (PT: include personal factors and/or comorbidities that impact the POC; OT: include additional occupational profile info) PMH significant for obesity, HTN, cellulitis infections, and venouc stasis.   Surgical / medical history reviewed Yes   Edema special tests Ultrasound   Prior level of functional mobility I   Prior treatment Compression garments;Elevation;Diuretics   Community support Family / friend caregiver   Patient role / employment history Employed   Living environment [facility]   Fall Risk Screen   Fall screen completed by OT   Have you fallen 2 or more times in the past year? No   Have you fallen and had an injury in the past year? No   Is patient a fall risk? No   System Outcome Measures   Lymphedema Life Impact Scale (score range 0-72). A higher score indicates greater impairment. 27   Subjective Report   Patient report of symptoms wounds; infections   Patient / Family Goals   Patient / family goals statement to learn how to reduce LE swelling and aide skin healing/wound healing   Pain   Patient currently in pain No   Cognitive Status   Orientation Orientation to person, place and time   Level of consciousness Alert   Follows commands and answers questions 100% of the  time   Personal safety and judgement Intact   Edema Exam / Assessment   Skin condition Pitting;Dryness   Skin condition comments 3+ pitting ankles-knee crease; b dosal feet 2+ 2/2 pressure shoes   Pitting 3+   Pitting location BLE   Capillary refill Symmetrical   Dorsal pedal pulse comments unable to plapate-no signs ischemia   Stemmer sign Negative   Girth Measurements   Girth Measurements (will obtain upon return for services)   Range of Motion   ROM comments WNL   Strength   Strength comments NT'd   Bed Mobility   Bed mobility I   Transfers   Transfers I   Gait / Locomotion   Gait / Locomotion I   Sensory   Sensory perception comments no neuropathy reported or identified   Coordination   Coordination Gross motor coordination appropriate   Muscle Tone   Muscle tone No deficits were identified   Planned Edema Interventions   Planned edema interventions Gradient compression bandaging;Fit for compression garment;Exercises;Precautions to prevent infection / exacerbation;Education;Skin care / precautions;Home management program development   Clinical Impression   Criteria for skilled therapeutic intervention met Yes   Therapy diagnosis lymphedema   Influenced by the following impairments / conditions Stage 2;Phlebolymphedema;Wounds / Ulcers   Assessment of Occupational Performance 1-3 Performance Deficits   Identified Performance Deficits decreased I with LB cares/dressing; wounds/infections   Clinical Decision Making (Complexity) Low complexity   Treatment frequency 2 times / week;3 times / week   Treatment duration 3x/wk x 2 weeks, then 2x/wk x 1 week, then 0x/wk x 3 weeks, then 1x/wk x 1 week   Patient / family and/or staff in agreement with plan of care Yes   Risks and benefits of therapy have been explained Yes   Clinical impression comments Pt will benefit from skilled lymphedema services to reduce BLE lymphedema to aide skin/wound healing for avoidance recurrent infections, and promote increased I with LB  dressing/clothing fit.   Goals   Edema Eval Goals 1;2;3;4;5;6   Goal 1   Goal identifier 1   Goal description In order to improve skin healing for infection prevention and promote increased I and easee with LB dressing/clothing fit, by the completion of the intensive phase of services the pt and/or caregiver will:   Target date 09/07/18   Goal 2   Goal identifier 1a   Goal description tolerate gradient compression bandaging/wearing compression garments 23 hrs/day to prevent re-acccumulation of extracellular fluid for reductions needed to aid eskin healing for infection prevention and promote increased ease and I with LB dressing/clothing fit   Target date 09/07/18   Goal 3   Goal identifier 1b   Goal description demonstrate independence in applying gradient compression bandages to build I with home management of BLE lymphedema for infection prevention and improved I with LB dressing/clothing fit.   Target date 09/07/18   Goal 4   Goal identifier 1c   Goal description demonstrate independence in performing prescribed exercises to facilate the muscle pumping system for max reductions needed to aid eskin healing for infection prevention and promote I with LB dressing/clothing fit   Target date 09/07/18   Goal 5   Goal identifier 1d   Goal description be independent in donning/doffing, wearing schedule, and care of compression garments for I building with home management of BLE lymphedema for improvements in skin integrity needed to avoid recurrent infections and promote reductions for improved Lb clothing fit/dressing   Target date 09/07/18   Goal 6   Goal identifier 2   Goal description Display total BLE volume reduction   Target date 09/07/18   Total Evaluation Time   Total evaluation time 15

## 2018-07-17 ENCOUNTER — HOSPITAL ENCOUNTER (OUTPATIENT)
Dept: OCCUPATIONAL THERAPY | Facility: CLINIC | Age: 58
Setting detail: THERAPIES SERIES
End: 2018-07-17
Attending: PHYSICIAN ASSISTANT
Payer: COMMERCIAL

## 2018-07-17 PROCEDURE — 40000445 ZZHC STATISTIC OT VISIT, LYMPHEDEMA

## 2018-07-17 PROCEDURE — 97535 SELF CARE MNGMENT TRAINING: CPT | Mod: GO

## 2018-07-18 ENCOUNTER — HOSPITAL ENCOUNTER (OUTPATIENT)
Dept: OCCUPATIONAL THERAPY | Facility: CLINIC | Age: 58
Setting detail: THERAPIES SERIES
End: 2018-07-18
Attending: PHYSICIAN ASSISTANT
Payer: COMMERCIAL

## 2018-07-18 PROCEDURE — 40000445 ZZHC STATISTIC OT VISIT, LYMPHEDEMA

## 2018-07-18 PROCEDURE — 97535 SELF CARE MNGMENT TRAINING: CPT | Mod: GO

## 2018-07-19 ENCOUNTER — HOSPITAL ENCOUNTER (OUTPATIENT)
Dept: OCCUPATIONAL THERAPY | Facility: CLINIC | Age: 58
Setting detail: THERAPIES SERIES
End: 2018-07-19
Attending: PHYSICIAN ASSISTANT
Payer: COMMERCIAL

## 2018-07-19 DIAGNOSIS — I10 ESSENTIAL HYPERTENSION WITH GOAL BLOOD PRESSURE LESS THAN 140/90: ICD-10-CM

## 2018-07-19 DIAGNOSIS — R60.0 PERIPHERAL EDEMA: ICD-10-CM

## 2018-07-19 LAB
ANION GAP SERPL CALCULATED.3IONS-SCNC: 7 MMOL/L (ref 3–14)
BUN SERPL-MCNC: 20 MG/DL (ref 7–30)
CALCIUM SERPL-MCNC: 8.6 MG/DL (ref 8.5–10.1)
CHLORIDE SERPL-SCNC: 105 MMOL/L (ref 94–109)
CO2 SERPL-SCNC: 28 MMOL/L (ref 20–32)
CREAT SERPL-MCNC: 0.84 MG/DL (ref 0.66–1.25)
GFR SERPL CREATININE-BSD FRML MDRD: >90 ML/MIN/1.7M2
GLUCOSE SERPL-MCNC: 99 MG/DL (ref 70–99)
POTASSIUM SERPL-SCNC: 4.3 MMOL/L (ref 3.4–5.3)
SODIUM SERPL-SCNC: 140 MMOL/L (ref 133–144)

## 2018-07-19 PROCEDURE — 40000445 ZZHC STATISTIC OT VISIT, LYMPHEDEMA

## 2018-07-19 PROCEDURE — 97535 SELF CARE MNGMENT TRAINING: CPT | Mod: GO

## 2018-07-19 PROCEDURE — 36415 COLL VENOUS BLD VENIPUNCTURE: CPT | Performed by: PHYSICIAN ASSISTANT

## 2018-07-19 PROCEDURE — 80048 BASIC METABOLIC PNL TOTAL CA: CPT | Performed by: PHYSICIAN ASSISTANT

## 2018-07-24 ENCOUNTER — HOSPITAL ENCOUNTER (OUTPATIENT)
Dept: OCCUPATIONAL THERAPY | Facility: CLINIC | Age: 58
Setting detail: THERAPIES SERIES
End: 2018-07-24
Attending: PHYSICIAN ASSISTANT
Payer: COMMERCIAL

## 2018-07-24 PROCEDURE — 97535 SELF CARE MNGMENT TRAINING: CPT | Mod: GO

## 2018-07-24 PROCEDURE — 40000445 ZZHC STATISTIC OT VISIT, LYMPHEDEMA

## 2018-07-26 ENCOUNTER — HOSPITAL ENCOUNTER (OUTPATIENT)
Dept: OCCUPATIONAL THERAPY | Facility: CLINIC | Age: 58
Setting detail: THERAPIES SERIES
End: 2018-07-26
Attending: PHYSICIAN ASSISTANT
Payer: COMMERCIAL

## 2018-07-26 PROCEDURE — 40000445 ZZHC STATISTIC OT VISIT, LYMPHEDEMA

## 2018-07-26 PROCEDURE — 97535 SELF CARE MNGMENT TRAINING: CPT | Mod: GO

## 2018-07-27 ENCOUNTER — HOSPITAL ENCOUNTER (OUTPATIENT)
Dept: OCCUPATIONAL THERAPY | Facility: CLINIC | Age: 58
Setting detail: THERAPIES SERIES
End: 2018-07-27
Attending: PHYSICIAN ASSISTANT
Payer: COMMERCIAL

## 2018-07-27 PROCEDURE — 97535 SELF CARE MNGMENT TRAINING: CPT | Mod: GO

## 2018-07-27 PROCEDURE — 40000445 ZZHC STATISTIC OT VISIT, LYMPHEDEMA

## 2018-07-31 ENCOUNTER — HOSPITAL ENCOUNTER (OUTPATIENT)
Dept: OCCUPATIONAL THERAPY | Facility: CLINIC | Age: 58
Setting detail: THERAPIES SERIES
End: 2018-07-31
Attending: PHYSICIAN ASSISTANT
Payer: COMMERCIAL

## 2018-07-31 PROCEDURE — 97535 SELF CARE MNGMENT TRAINING: CPT | Mod: GO

## 2018-07-31 PROCEDURE — 40000445 ZZHC STATISTIC OT VISIT, LYMPHEDEMA

## 2018-08-02 ENCOUNTER — HOSPITAL ENCOUNTER (OUTPATIENT)
Dept: OCCUPATIONAL THERAPY | Facility: CLINIC | Age: 58
Setting detail: THERAPIES SERIES
End: 2018-08-02
Attending: PHYSICIAN ASSISTANT
Payer: COMMERCIAL

## 2018-08-02 PROCEDURE — 97535 SELF CARE MNGMENT TRAINING: CPT | Mod: GO

## 2018-08-02 PROCEDURE — 40000445 ZZHC STATISTIC OT VISIT, LYMPHEDEMA

## 2018-08-31 DIAGNOSIS — R60.0 PERIPHERAL EDEMA: ICD-10-CM

## 2018-08-31 DIAGNOSIS — I10 ESSENTIAL HYPERTENSION WITH GOAL BLOOD PRESSURE LESS THAN 140/90: ICD-10-CM

## 2018-08-31 RX ORDER — FUROSEMIDE 40 MG
TABLET ORAL
Qty: 30 TABLET | Refills: 1 | Status: CANCELLED | OUTPATIENT
Start: 2018-08-31

## 2018-08-31 NOTE — TELEPHONE ENCOUNTER
"Requested Prescriptions   Pending Prescriptions Disp Refills     furosemide (LASIX) 40 MG tablet [Pharmacy Med Name: FUROSEMIDE 40 MG TABLET]    Last Written Prescription Date:  7/6/18  Last Fill Quantity: 30 tablets,  # refills: 1   Last office visit: 7/3/2018 with prescribing provider:  Alexandra   Future Office Visit:     30 tablet 1     Sig: TAKE 1 TABLET BY MOUTH EVERY DAY    Diuretics (Including Combos) Protocol Failed    8/31/2018  1:56 AM       Failed - Blood pressure under 140/90 in past 12 months    BP Readings from Last 3 Encounters:   07/03/18 156/80   12/27/17 136/86   09/06/16 151/76                Passed - Recent (12 mo) or future (30 days) visit within the authorizing provider's specialty    Patient had office visit in the last 12 months or has a visit in the next 30 days with authorizing provider or within the authorizing provider's specialty.  See \"Patient Info\" tab in inbasket, or \"Choose Columns\" in Meds & Orders section of the refill encounter.           Passed - Patient is age 18 or older       Passed - Normal serum creatinine on file in past 12 months    Recent Labs   Lab Test  07/19/18   0902   CR  0.84             Passed - Normal serum potassium on file in past 12 months    Recent Labs   Lab Test  07/19/18   0902   POTASSIUM  4.3                   Passed - Normal serum sodium on file in past 12 months    Recent Labs   Lab Test  07/19/18   0902   NA  140                "

## 2018-09-18 ENCOUNTER — MYC MEDICAL ADVICE (OUTPATIENT)
Dept: FAMILY MEDICINE | Facility: CLINIC | Age: 58
End: 2018-09-18

## 2018-09-18 DIAGNOSIS — I89.0 LYMPHEDEMA: ICD-10-CM

## 2018-09-19 NOTE — TELEPHONE ENCOUNTER
Monico, t'd up extra compression stocking DME. Waiting for reply form Da where he wants DME sent or picked up .   Ezio Daigle RN

## 2018-09-20 ENCOUNTER — HOSPITAL ENCOUNTER (OUTPATIENT)
Dept: OCCUPATIONAL THERAPY | Facility: CLINIC | Age: 58
Setting detail: THERAPIES SERIES
End: 2018-09-20
Attending: PHYSICIAN ASSISTANT
Payer: COMMERCIAL

## 2018-09-20 ENCOUNTER — ALLIED HEALTH/NURSE VISIT (OUTPATIENT)
Dept: NURSING | Facility: CLINIC | Age: 58
End: 2018-09-20
Payer: COMMERCIAL

## 2018-09-20 VITALS — DIASTOLIC BLOOD PRESSURE: 56 MMHG | WEIGHT: 315 LBS | BODY MASS INDEX: 56.49 KG/M2 | SYSTOLIC BLOOD PRESSURE: 122 MMHG

## 2018-09-20 DIAGNOSIS — I10 ESSENTIAL HYPERTENSION WITH GOAL BLOOD PRESSURE LESS THAN 140/90: Primary | ICD-10-CM

## 2018-09-20 PROCEDURE — 99207 ZZC NO CHARGE NURSE ONLY: CPT

## 2018-09-20 PROCEDURE — 40000445 ZZHC STATISTIC OT VISIT, LYMPHEDEMA

## 2018-09-20 PROCEDURE — 97535 SELF CARE MNGMENT TRAINING: CPT | Mod: GO

## 2018-09-20 NOTE — MR AVS SNAPSHOT
After Visit Summary   9/20/2018    Da Mustafa    MRN: [id number]           Patient Information     Date Of Birth          1960        Visit Information        Provider Department      9/20/2018 9:30 AM VIVIANE LIMA/LPN [facility]        Today's Diagnoses     Essential hypertension with goal blood pressure less than 140/90    -  1       Follow-ups after your visit        Who to contact     If you have questions or need follow up information about today's clinic visit or your schedule please contact [facility] directly at [phone].  Normal or non-critical lab and imaging results will be communicated to you by [employer], letter or phone within 4 business days after the clinic has received the results. If you do not hear from us within 7 days, please contact the clinic through [employer] or phone. If you have a critical or abnormal lab result, we will notify you by phone as soon as possible.  Submit refill requests through [employer] or call your pharmacy and they will forward the refill request to us. Please allow 3 business days for your refill to be completed.          Additional Information About Your Visit        MyChart Information     [employer] gives you secure access to your electronic health record. If you see a primary care provider, you can also send messages to your care team and make appointments. If you have questions, please call your primary care clinic.  If you do not have a primary care provider, please call [phone] and they will assist you.        Care EveryWhere ID     This is your Care EveryWhere ID. This could be used by other organizations to access your [facility] medical records  [id number]        Your Vitals Were     BMI (Body Mass Index)                   56.49 kg/m2            Blood Pressure from Last 3 Encounters:   09/20/18 122/56   07/03/18 156/80   12/27/17 136/86    Weight from Last 3 Encounters:   09/20/18 (!) 461 lb (209.1 kg)    07/03/18 (!) 480 lb (217.7 kg)   12/27/17 (!) 439 lb (199.1 kg)              Today, you had the following     No orders found for display       Primary Care Provider Office Phone # Fax #    Lex Castro [phone] [address] [facility] [facility] [address]        Equal Access to Services     : Hadii aad ku hadasho Soomaali, waaxda luqadaha, qaybta kaalmada adeegyada, waxay idiin hayaan adeeg kharash la'aan ah. So [facility] [phone].    ATENCIÓN: Si habla español, tiene a pitts disposición servicios gratuitos de asistencia lingüística. Llame al [phone].    We comply with applicable federal civil rights laws and [place] laws. We do not discriminate on the basis of race, color, national origin, age, disability, sex, sexual orientation, or gender identity.            Thank you!     Thank you for choosing [facility]  for your care. Our goal is always to provide you with excellent care. Hearing back from our patients is one way we can continue to improve our services. Please take a few minutes to complete the written survey that you may receive in the mail after your visit with us. Thank you!             Your Updated Medication List - Protect others around you: Learn how to safely use, store and throw away your medicines at www.disposemymeds.org.          This list is accurate as of 9/20/18  9:41 AM.  Always use your most recent med list.                   Brand Name Dispense Instructions for use Diagnosis    fluticasone 50 MCG/ACT spray    FLONASE    16 mL    SPRAY 2 SPRAYS INTO BOTH NOSTRILS DAILY    Seasonal allergic rhinitis       furosemide 40 MG tablet    LASIX    90 tablet    Take 1 tablet (40 mg) by mouth daily    Peripheral edema, Essential hypertension with goal blood pressure less than 140/90       lisinopril 40 MG tablet    PRINIVIL/ZESTRIL    90 tablet    TAKE 1 TABLET (40 MG) BY MOUTH DAILY SIG PLEASE SEE MD    Essential hypertension with goal blood  pressure less than 140/90       order for DME     1 each    BLE knee high custom compression stockings 20-30 or 30-40 mm Hg compression stockings    Lymphedema       sulfamethoxazole-trimethoprim 800-160 MG per tablet    BACTRIM DS/SEPTRA DS    14 tablet    Take 1 tablet by mouth 2 times daily    Cellulitis of left lower extremity

## 2018-09-20 NOTE — PROGRESS NOTES
Outpatient Occupational Therapy Discharge Note     Patient: Da Mustafa  : 1960    Beginning/End Dates of Reporting Period:  7/10/18 to 2018    Referring Provider: Monico Begum Diagnosis: lymphedema    Client Self Report:       Objective Measurements: see flowsheet                                                        Outcome Measures (most recent score):  Lymphedema Life Impact Scale (score range 0-72). A higher score indicates greater impairment.: 14-post score from 27 pre score indicates benefit and satisfaction with services    Goals:   Goal Identifier 1   Goal Description In order to improve skin healing for infection prevention and promote increased I and easee with LB dressing/clothing fit, by the completion of the intensive phase of services the pt and/or caregiver will:   Target Date 18   Date Met      Progress:     Goal Identifier 1a   Goal Description tolerate gradient compression bandaging/wearing compression garments 23 hrs/day to prevent re-acccumulation of extracellular fluid for reductions needed to aid eskin healing for infection prevention and promote increased ease and I with LB dressing/clothing fit   Target Date 18   Date Met  18   Progress:     Goal Identifier 1b   Goal Description demonstrate independence in applying gradient compression bandages to build I with home management of BLE lymphedema for infection prevention and improved I with LB dressing/clothing fit.   Target Date 18   Date Met  18   Progress:     Goal Identifier 1c   Goal Description demonstrate independence in performing prescribed exercises to facilate the muscle pumping system for max reductions needed to aid eskin healing for infection prevention and promote I with LB dressing/clothing fit   Target Date 18   Date Met   (not met-pt does not perform ex's)   Progress:     Goal Identifier 1d   Goal Description be independent in donning/doffing, wearing schedule,  and care of compression garments for I building with home management of BLE lymphedema for improvements in skin integrity needed to avoid recurrent infections and promote reductions for improved Lb clothing fit/dressing   Target Date 09/07/18   Date Met  09/20/18   Progress:     Goal Identifier 2   Goal Description Display total BLE volume reduction 1L+ for reductions needed to aide better fit with LB clothing   Target Date 09/07/18   Date Met  07/27/18   Progress:     Goal Identifier     Goal Description     Target Date     Date Met      Progress:     Goal Identifier     Goal Description     Target Date     Date Met      Progress:     Progress Toward Goals:   Progress this reporting period: Pt and his spouse have built I with use of GCB's and reduced BLE lymphedema. Pt has transitioned to daily custom compression stocking use and has decided to exchange nighttime GCB for velcro garments 2/2 time management and conveniance. Tissue of BLE's softer with compression and manual techniques learned in clinic and open skin has healed. Pt defers use HEP despite knowledge that it can maximize efficiency with home management of BLE lymphedema.      Plan:  Discharge from therapy.    Discharge:    Reason for Discharge: Patient has met all goals.  No further expectation of progress.    Equipment Issued: pt has obtained custom knee high compression and velcro compression garments for home use    Discharge Plan: Patient to continue home program.

## 2019-01-13 DIAGNOSIS — I10 ESSENTIAL HYPERTENSION WITH GOAL BLOOD PRESSURE LESS THAN 140/90: ICD-10-CM

## 2019-01-14 NOTE — TELEPHONE ENCOUNTER
"Requested Prescriptions   Pending Prescriptions Disp Refills     lisinopril (PRINIVIL/ZESTRIL) 40 MG tablet [Pharmacy Med Name: LISINOPRIL 40 MG TABLET]  Last Written Prescription Date:  12/27/2018  Last Fill Quantity: 90 tablet,  # refills: 3   Last office visit: 7/3/2018 with prescribing provider:  Alexandra   Future Office Visit:     90 tablet 3     Sig: TAKE 1 TABLET (40 MG) BY MOUTH DAILY. *PLEASE SEE MD FOR APPT    ACE Inhibitors (Including Combos) Protocol Passed - 1/13/2019  8:37 AM       Passed - Blood pressure under 140/90 in past 12 months    BP Readings from Last 3 Encounters:   09/20/18 122/56   07/03/18 156/80   12/27/17 136/86                Passed - Recent (12 mo) or future (30 days) visit within the authorizing provider's specialty    Patient had office visit in the last 12 months or has a visit in the next 30 days with authorizing provider or within the authorizing provider's specialty.  See \"Patient Info\" tab in inbasket, or \"Choose Columns\" in Meds & Orders section of the refill encounter.             Passed - Medication is active on med list       Passed - Patient is age 18 or older       Passed - Normal serum creatinine on file in past 12 months    Recent Labs   Lab Test 07/19/18  0902   CR 0.84            Passed - Normal serum potassium on file in past 12 months    Recent Labs   Lab Test 07/19/18  0902   POTASSIUM 4.3               "

## 2019-01-15 RX ORDER — LISINOPRIL 40 MG/1
TABLET ORAL
Qty: 90 TABLET | Refills: 3 | Status: SHIPPED | OUTPATIENT
Start: 2019-01-15 | End: 2019-04-24

## 2019-01-15 NOTE — TELEPHONE ENCOUNTER
Prescription approved per [facility] Refill Protocol.    Note to pharmacy that before next refill will need to see PCP.     Josefina Rodriguez RN Flex

## 2019-02-26 DIAGNOSIS — R60.0 PERIPHERAL EDEMA: ICD-10-CM

## 2019-02-26 DIAGNOSIS — I10 ESSENTIAL HYPERTENSION WITH GOAL BLOOD PRESSURE LESS THAN 140/90: ICD-10-CM

## 2019-02-26 NOTE — TELEPHONE ENCOUNTER
"Last Written Prescription Date:  9/04/18  Last Fill Quantity: 90 tablet,  # refills: 1   Last office visit: 7/3/2018 with prescribing provider:  Alexandra   Future Office Visit:      Requested Prescriptions   Pending Prescriptions Disp Refills     furosemide (LASIX) 40 MG tablet [Pharmacy Med Name: FUROSEMIDE 40 MG TABLET] 90 tablet 1     Sig: TAKE 1 TABLET BY MOUTH EVERY DAY    Diuretics (Including Combos) Protocol Passed - 2/26/2019  1:06 AM       Passed - Blood pressure under 140/90 in past 12 months    BP Readings from Last 3 Encounters:   09/20/18 122/56   07/03/18 156/80   12/27/17 136/86                Passed - Recent (12 mo) or future (30 days) visit within the authorizing provider's specialty    Patient had office visit in the last 12 months or has a visit in the next 30 days with authorizing provider or within the authorizing provider's specialty.  See \"Patient Info\" tab in inbasket, or \"Choose Columns\" in Meds & Orders section of the refill encounter.             Passed - Medication is active on med list       Passed - Patient is age 18 or older       Passed - Normal serum creatinine on file in past 12 months    Recent Labs   Lab Test 07/19/18  0902   CR 0.84             Passed - Normal serum potassium on file in past 12 months    Recent Labs   Lab Test 07/19/18  0902   POTASSIUM 4.3                   Passed - Normal serum sodium on file in past 12 months    Recent Labs   Lab Test 07/19/18  0902                   "

## 2019-02-27 RX ORDER — FUROSEMIDE 40 MG
TABLET ORAL
Qty: 30 TABLET | Refills: 0 | Status: SHIPPED | OUTPATIENT
Start: 2019-02-27 | End: 2019-03-27

## 2019-03-26 DIAGNOSIS — R60.0 PERIPHERAL EDEMA: ICD-10-CM

## 2019-03-26 DIAGNOSIS — I10 ESSENTIAL HYPERTENSION WITH GOAL BLOOD PRESSURE LESS THAN 140/90: ICD-10-CM

## 2019-03-26 NOTE — TELEPHONE ENCOUNTER
"Last Written Prescription Date:  2/27/19  Last Fill Quantity: 30 tablet,  # refills: 0   Last office visit: 7/3/2018 with prescribing provider:  Alexandra   Future Office Visit:      Notes to Pharmacy: Please inform:  See PCP before further refills or quantity changes.    Requested Prescriptions   Pending Prescriptions Disp Refills     furosemide (LASIX) 40 MG tablet 30 tablet 0     Sig: Take 1 tablet (40 mg) by mouth daily    Diuretics (Including Combos) Protocol Passed - 3/26/2019 10:43 AM       Passed - Blood pressure under 140/90 in past 12 months    BP Readings from Last 3 Encounters:   09/20/18 122/56   07/03/18 156/80   12/27/17 136/86                Passed - Recent (12 mo) or future (30 days) visit within the authorizing provider's specialty    Patient had office visit in the last 12 months or has a visit in the next 30 days with authorizing provider or within the authorizing provider's specialty.  See \"Patient Info\" tab in inbasket, or \"Choose Columns\" in Meds & Orders section of the refill encounter.             Passed - Medication is active on med list       Passed - Patient is age 18 or older       Passed - Normal serum creatinine on file in past 12 months    Recent Labs   Lab Test 07/19/18  0902   CR 0.84             Passed - Normal serum potassium on file in past 12 months    Recent Labs   Lab Test 07/19/18  0902   POTASSIUM 4.3                   Passed - Normal serum sodium on file in past 12 months    Recent Labs   Lab Test 07/19/18  0902                   "

## 2019-03-26 NOTE — LETTER
[address] [address] [phone]  March 28, 2019    Da Mustafa  [address] [place] [address]    Dear Da,    I care about your health and have reviewed your health plan. I have reviewed your medical conditions, medication list, and lab results and am making recommendations based on this review, to better manage your health.    You are in particular need of attention regarding:  -Lasix refill     I am recommending that you:  -schedule a FOLLOWUP OFFICE APPOINTMENT with me.       Here is a list of Health Maintenance topics that are due now or due soon:  Health Maintenance Due   Topic Date Due     HIV SCREEN (SYSTEM ASSIGNED)  09/02/1980     HEPATITIS C SCREENING  09/02/1980     ZOSTER IMMUNIZATION (1 of 2) 09/02/2012     ADVANCE DIRECTIVE PLANNING Q5 YRS  09/02/2017     INFLUENZA VACCINE (1) 09/01/2018     PREVENTIVE CARE VISIT  12/27/2018     PHQ-2 Q1 YR  12/27/2018       Please call us at [phone] (or use [employer]) to address the above recommendations.     Thank you for trusting [facility] and we appreciate the opportunity to serve you.  We look forward to supporting your healthcare needs in the future.    Healthy Regards,    Robel Morris PA-C

## 2019-03-27 RX ORDER — FUROSEMIDE 40 MG
40 TABLET ORAL DAILY
Qty: 7 TABLET | Refills: 0 | Status: SHIPPED | OUTPATIENT
Start: 2019-03-27 | End: 2019-04-24

## 2019-03-27 NOTE — TELEPHONE ENCOUNTER
Routing refill request to provider for review/approval because:  Chiqui given x1 and patient did not follow up, please advise  Vidya Morocho RN, BSN

## 2019-03-28 DIAGNOSIS — R60.0 PERIPHERAL EDEMA: ICD-10-CM

## 2019-03-28 DIAGNOSIS — I10 ESSENTIAL HYPERTENSION WITH GOAL BLOOD PRESSURE LESS THAN 140/90: ICD-10-CM

## 2019-03-29 RX ORDER — FUROSEMIDE 40 MG
TABLET ORAL
Qty: 30 TABLET | Refills: 0 | OUTPATIENT
Start: 2019-03-29

## 2019-04-24 ENCOUNTER — OFFICE VISIT (OUTPATIENT)
Dept: FAMILY MEDICINE | Facility: CLINIC | Age: 59
End: 2019-04-24
Payer: COMMERCIAL

## 2019-04-24 VITALS
RESPIRATION RATE: 14 BRPM | DIASTOLIC BLOOD PRESSURE: 69 MMHG | TEMPERATURE: 98.1 F | HEIGHT: 76 IN | WEIGHT: 315 LBS | SYSTOLIC BLOOD PRESSURE: 126 MMHG | OXYGEN SATURATION: 95 % | HEART RATE: 81 BPM | BODY MASS INDEX: 38.36 KG/M2

## 2019-04-24 DIAGNOSIS — R60.0 PERIPHERAL EDEMA: ICD-10-CM

## 2019-04-24 DIAGNOSIS — I10 ESSENTIAL HYPERTENSION WITH GOAL BLOOD PRESSURE LESS THAN 140/90: ICD-10-CM

## 2019-04-24 DIAGNOSIS — J30.2 SEASONAL ALLERGIC RHINITIS, UNSPECIFIED TRIGGER: ICD-10-CM

## 2019-04-24 DIAGNOSIS — R06.83 SNORING: ICD-10-CM

## 2019-04-24 DIAGNOSIS — E66.01 MORBID OBESITY (H): ICD-10-CM

## 2019-04-24 DIAGNOSIS — Z12.5 SCREENING FOR PROSTATE CANCER: ICD-10-CM

## 2019-04-24 DIAGNOSIS — Z11.59 NEED FOR HEPATITIS C SCREENING TEST: ICD-10-CM

## 2019-04-24 DIAGNOSIS — Z11.4 SCREENING FOR HIV (HUMAN IMMUNODEFICIENCY VIRUS): ICD-10-CM

## 2019-04-24 DIAGNOSIS — Z00.00 ENCOUNTER FOR PREVENTIVE HEALTH EXAMINATION: Primary | ICD-10-CM

## 2019-04-24 PROCEDURE — 86803 HEPATITIS C AB TEST: CPT | Performed by: PHYSICIAN ASSISTANT

## 2019-04-24 PROCEDURE — G0103 PSA SCREENING: HCPCS | Performed by: PHYSICIAN ASSISTANT

## 2019-04-24 PROCEDURE — 99396 PREV VISIT EST AGE 40-64: CPT | Performed by: PHYSICIAN ASSISTANT

## 2019-04-24 PROCEDURE — 80061 LIPID PANEL: CPT | Performed by: PHYSICIAN ASSISTANT

## 2019-04-24 PROCEDURE — 87389 HIV-1 AG W/HIV-1&-2 AB AG IA: CPT | Performed by: PHYSICIAN ASSISTANT

## 2019-04-24 PROCEDURE — 36415 COLL VENOUS BLD VENIPUNCTURE: CPT | Performed by: PHYSICIAN ASSISTANT

## 2019-04-24 PROCEDURE — 80053 COMPREHEN METABOLIC PANEL: CPT | Performed by: PHYSICIAN ASSISTANT

## 2019-04-24 RX ORDER — LORATADINE 10 MG/1
10 TABLET ORAL DAILY
COMMUNITY

## 2019-04-24 RX ORDER — FUROSEMIDE 40 MG
40 TABLET ORAL DAILY
Qty: 90 TABLET | Refills: 1 | Status: SHIPPED | OUTPATIENT
Start: 2019-04-24 | End: 2019-10-20

## 2019-04-24 RX ORDER — SODIUM PHOSPHATE,MONO-DIBASIC 19G-7G/118
1 ENEMA (ML) RECTAL DAILY
COMMUNITY

## 2019-04-24 RX ORDER — MULTIPLE VITAMINS W/ MINERALS TAB 9MG-400MCG
1 TAB ORAL DAILY
COMMUNITY

## 2019-04-24 RX ORDER — LISINOPRIL 40 MG/1
40 TABLET ORAL DAILY
Qty: 90 TABLET | Refills: 3 | Status: SHIPPED | OUTPATIENT
Start: 2019-04-24 | End: 2020-02-28

## 2019-04-24 RX ORDER — OMEGA-3 FATTY ACIDS/FISH OIL 300-1000MG
CAPSULE ORAL
Status: ON HOLD | COMMUNITY
End: 2020-07-06

## 2019-04-24 RX ORDER — FLUTICASONE PROPIONATE 50 MCG
SPRAY, SUSPENSION (ML) NASAL
Qty: 16 ML | Refills: 0 | Status: SHIPPED | OUTPATIENT
Start: 2019-04-24 | End: 2019-05-17

## 2019-04-24 ASSESSMENT — ENCOUNTER SYMPTOMS
NERVOUS/ANXIOUS: 0
DIARRHEA: 0
PARESTHESIAS: 0
SORE THROAT: 0
FREQUENCY: 0
WEAKNESS: 0
JOINT SWELLING: 0
PALPITATIONS: 0
CONSTIPATION: 0
EYE PAIN: 0
HEMATURIA: 0
FEVER: 0
DYSURIA: 0
HEADACHES: 0
NAUSEA: 0
COUGH: 0
ABDOMINAL PAIN: 0
SHORTNESS OF BREATH: 0
HEMATOCHEZIA: 0
MYALGIAS: 0
CHILLS: 0
DIZZINESS: 0
ARTHRALGIAS: 1
HEARTBURN: 0

## 2019-04-24 ASSESSMENT — MIFFLIN-ST. JEOR: SCORE: 3091.75

## 2019-04-24 NOTE — PATIENT INSTRUCTIONS
Preventive Health Recommendations  Male Ages 50 - 64    Yearly exam:             See your health care provider every year in order to  o   Review health changes.   o   Discuss preventive care.    o   Review your medicines if your doctor has prescribed any.     Have a cholesterol test every 5 years, or more frequently if you are at risk for high cholesterol/heart disease.     Have a diabetes test (fasting glucose) every three years. If you are at risk for diabetes, you should have this test more often.     Have a colonoscopy at age 50, or have a yearly FIT test (stool test). These exams will check for colon cancer.      Talk with your health care provider about whether or not a prostate cancer screening test (PSA) is right for you.    You should be tested each year for STDs (sexually transmitted diseases), if you re at risk.     Shots: Get a flu shot each year. Get a tetanus shot every 10 years.     Nutrition:    Eat at least 5 servings of fruits and vegetables daily.     Eat whole-grain bread, whole-wheat pasta and brown rice instead of white grains and rice.     Get adequate Calcium and Vitamin D.     Lifestyle    Exercise for at least 150 minutes a week (30 minutes a day, 5 days a week). This will help you control your weight and prevent disease.     Limit alcohol to one drink per day.     No smoking.     Wear sunscreen to prevent skin cancer.     See your dentist every six months for an exam and cleaning.     See your eye doctor every 1 to 2 years.    Patient Education     Deciding on Bariatric Surgery  Is excess weight affecting your life and your health? Bariatric surgery (also called obesity surgery) may help you reach a healthier weight. This surgery alters your digestive system. For the surgery to work, you must change your diet and lifestyle. In most cases, the surgery is not reversible. So if you re considering surgery, learn all you can about it before you decide. Bariatric surgery also has a number of  potential risks and complications that you need to discuss with your surgeon.      Qualifying for surgery  Surgery is not for everyone. To qualify:    You must have a BMI of 40 or more, or a BMI of 35 or more (see box below) plus a serious obesity-related health problem, such as type 2 diabetes, high blood pressure, or sleep apnea.    You must be healthy enough to have surgery.    You may be required to have a psychological evaluation.    You must have tried to lose weight by other means, such as dieting.    Setting realistic expectations  The goal of bariatric surgery is to help you lose over half of your excess weight. This can improve or prevent health problems. This surgery is not done for cosmetic reasons. Keep in mind that:    Other weight-loss methods should be tried first. Lifestyle changes, behavioral modifications, and prescription medicines are initial choices. Surgery is only a choice if other methods don t work.    Surgery is meant to be permanent. You will need to change how you eat for the rest of your life.    You must commit to eating less and being more active after surgery. If you don t, you will not lose or keep off the weight.    You won t reach a healthy weight right away. Most weight is lost steadily during the first year or two after surgery.    Most likely, you won t lose all your excess weight. But you can reach a much healthier weight.  Obesity is measured by a formula called body mass index (BMI), which is based on your height and weight. A healthy BMI is about 18 to 25. A BMI of 30 or more signals obesity. A BMI of 40 or more reflects severe (morbid) obesity.   Resources    American Society for Metabolic and Bariatric Surgerywww.asmbs.org    National Heart, Lung, and Blood [place] Obesity Education Initiativewww.nhlbi.nih.gov/health/public/heart/obesity/lose_wt  Date Last Reviewed: 2/17/2016 2000-2017 [employer]. [address] [place] [address], [place] [address]. All rights  reserved. This information is not intended as a substitute for professional medical care. Always follow your healthcare professional's instructions.

## 2019-04-24 NOTE — PROGRESS NOTES
SUBJECTIVE:   CC: Da Mustafa is an 58 year old male who presents for preventative health visit.     Healthy Habits:     Getting at least 3 servings of Calcium per day:  Yes    Bi-annual eye exam:  NO    Dental care twice a year:  Yes    Sleep apnea or symptoms of sleep apnea:  Excessive snoring and Sleep apnea    Diet:  Carbohydrate counting    Frequency of exercise:  None    Taking medications regularly:  No    Barriers to taking medications:  Problems remembering to take them    Medication side effects:  None    PHQ-2 Total Score: 0    Additional concerns today:  Yes      Hypertension Follow-up      Outpatient blood pressures are not being checked.    Low Salt Diet: no added salt      Today's PHQ-2 Score:   PHQ-2 ( 1999 Pfizer) 4/24/2019   Q1: Little interest or pleasure in doing things 0   Q2: Feeling down, depressed or hopeless 0   PHQ-2 Score 0   Q1: Little interest or pleasure in doing things Not at all   Q2: Feeling down, depressed or hopeless Not at all   PHQ-2 Score 0       Abuse: Current or Past(Physical, Sexual or Emotional)- No  Do you feel safe in your environment? Yes    Social History     Tobacco Use     Smoking status: Never Smoker     Smokeless tobacco: Never Used   Substance Use Topics     Alcohol use: Yes     Comment: rare     If you drink alcohol do you typically have >3 drinks per day or >7 drinks per week? No    Alcohol Use 4/24/2019   Prescreen: >3 drinks/day or >7 drinks/week? No   Prescreen: >3 drinks/day or >7 drinks/week? -   No flowsheet data found.    Last PSA:   PSA   Date Value Ref Range Status   12/27/2017 0.75 0 - 4 ug/L Final     Comment:     Assay Method:  Chemiluminescence using Siemens Vista analyzer       Reviewed orders with patient. Reviewed health maintenance and updated orders accordingly - Yes  BP Readings from Last 3 Encounters:   04/24/19 126/69   09/20/18 122/56   07/03/18 156/80    Wt Readings from Last 3 Encounters:   04/24/19 (!) 216.8 kg (478 lb)   09/20/18  (!) 209.1 kg (461 lb)   07/03/18 (!) 217.7 kg (480 lb)                  Patient Active Problem List   Diagnosis     Edema     Family history of diabetes mellitus     Seasonal allergic rhinitis     Hyperlipidemia LDL goal <160     Essential hypertension with goal blood pressure less than 140/90     Arthritis of left foot     Class 3 obesity due to excess calories without serious comorbidity with body mass index (BMI) of 50.0 to 59.9 in adult     Venous stasis dermatitis of both lower extremities     Peripheral edema     Lymphedema     Morbid obesity (H)     Past Surgical History:   Procedure Laterality Date     C NONSPECIFIC PROCEDURE       COLONOSCOPY  12/4/2013    Procedure: COLONOSCOPY;  Colonoscopy  ;  Surgeon: Beny Rihc MD;  Location:  GI       Social History     Tobacco Use     Smoking status: Never Smoker     Smokeless tobacco: Never Used   Substance Use Topics     Alcohol use: Yes     Comment: rare     Family History   Problem Relation Age of Onset     Cancer Father          Current Outpatient Medications   Medication Sig Dispense Refill     fluticasone (FLONASE) 50 MCG/ACT nasal spray SPRAY 2 SPRAYS INTO BOTH NOSTRILS DAILY 16 mL 0     furosemide (LASIX) 40 MG tablet Take 1 tablet (40 mg) by mouth daily 90 tablet 1     garlic 150 MG TABS tablet Take 150 mg by mouth daily       glucosamine-chondroitin 500-400 MG CAPS per capsule Take 1 capsule by mouth daily       lisinopril (PRINIVIL/ZESTRIL) 40 MG tablet Take 1 tablet (40 mg) by mouth daily 90 tablet 3     loratadine (CLARITIN) 10 MG tablet Take 10 mg by mouth daily       multivitamin w/minerals (MULTI-VITAMIN) tablet Take 1 tablet by mouth daily       omega 3 1000 MG CAPS        order for DME BLE knee high custom compression stockings 20-30 or 30-40 mm Hg compression stockings 1 each 0     Allergies   Allergen Reactions     No Known Drug Allergies        Reviewed and updated as needed this visit by clinical staff  Tobacco  Allergies  Meds   "Problems  Med Hx  Surg Hx  Fam Hx  Soc Hx          Reviewed and updated as needed this visit by Provider  Tobacco  Allergies  Meds  Problems  Med Hx  Surg Hx  Fam Hx        Past Medical History:   Diagnosis Date     Class 3 obesity due to excess calories without serious comorbidity with body mass index (BMI) of 50.0 to 59.9 in adult 12/27/2017     Edema      Essential hypertension with goal blood pressure less than 140/90 7/25/2016     Essential hypertension, benign      Hyperlipidemia LDL goal <160 7/28/2015     Hypertension goal BP (blood pressure) < 140/90 4/25/2013     Obesity, unspecified      Seasonal allergic rhinitis 5/19/2014     Sleep apnea       Past Surgical History:   Procedure Laterality Date     C NONSPECIFIC PROCEDURE       COLONOSCOPY  12/4/2013    Procedure: COLONOSCOPY;  Colonoscopy  ;  Surgeon: Beny Rich MD;  Location:  GI       Review of Systems   Constitutional: Negative for chills and fever.   HENT: Negative for congestion, ear pain, hearing loss and sore throat.    Eyes: Negative for pain and visual disturbance.   Respiratory: Negative for cough and shortness of breath.    Cardiovascular: Positive for peripheral edema. Negative for chest pain and palpitations.   Gastrointestinal: Negative for abdominal pain, constipation, diarrhea, heartburn, hematochezia and nausea.   Genitourinary: Negative for discharge, dysuria, frequency, genital sores, hematuria, impotence and urgency.   Musculoskeletal: Positive for arthralgias. Negative for joint swelling and myalgias.   Skin: Positive for rash.   Neurological: Negative for dizziness, weakness, headaches and paresthesias.   Psychiatric/Behavioral: Negative for mood changes. The patient is not nervous/anxious.        OBJECTIVE:   /69 (BP Location: Right arm, Patient Position: Chair, Cuff Size: Adult Large)   Pulse 81   Temp 98.1  F (36.7  C) (Oral)   Resp 14   Ht 1.918 m (6' 3.5\")   Wt (!) 216.8 kg (478 lb)   SpO2 95%  "  BMI 58.96 kg/m      Physical Exam  GENERAL: alert, no distress and obese  EYES: Eyes grossly normal to inspection, PERRL and conjunctivae and sclerae normal  HENT: ear canals and TM's normal, nose and mouth without ulcers or lesions  NECK: no adenopathy, no asymmetry, masses, or scars and thyroid normal to palpation  RESP: lungs clear to auscultation - no rales, rhonchi or wheezes  CV: regular rate and rhythm, normal S1 S2, no S3 or S4, no murmur, click or rub, no peripheral edema and peripheral pulses strong  ABDOMEN: soft, nontender, no hepatosplenomegaly, no masses and bowel sounds normal  MS: no gross musculoskeletal defects noted, no edema  SKIN: stasis dermatitis noted on left anterior lower leg with small bolus present.   NEURO: Normal strength and tone, mentation intact and speech normal  PSYCH: mentation appears normal, affect normal/bright  LYMPH: no cervical adenopathy    Diagnostic Test Results:  none     ASSESSMENT/PLAN:   (Z00.00) Encounter for preventive health examination  (primary encounter diagnosis)  Comment: overall, no concerns on exam other than weight and ongoing edema. Had long discussion on importance of weight loss. Has been recommended to pursue bariatric surgery in the past. Patient is not interested in this. Will instead see comprehensive weight loss clinic.   Plan: Lipid panel reflex to direct LDL Fasting,         Comprehensive metabolic panel (BMP + Alb, Alk         Phos, ALT, AST, Total. Bili, TP)            (Z11.59) Need for hepatitis C screening test  Comment:   Plan: Hepatitis C Screen Reflex to HCV RNA Quant and         Genotype            (Z11.4) Screening for HIV (human immunodeficiency virus)  Comment:   Plan: HIV Screening          (R60.9) Peripheral edema  Comment: ongoing. Discussed likely due to weight. Needs to work on weight loss.   Plan: furosemide (LASIX) 40 MG tablet, Comprehensive         metabolic panel (BMP + Alb, Alk Phos, ALT, AST,        Total. Bili, TP)         "-Medication use and side effects discussed with the patient. Patient is in complete understanding and agreement with plan.       (I10) Essential hypertension with goal blood pressure less than 140/90  Comment: stable  Plan: furosemide (LASIX) 40 MG tablet, lisinopril         (PRINIVIL/ZESTRIL) 40 MG tablet, Comprehensive         metabolic panel (BMP + Alb, Alk Phos, ALT, AST,        Total. Bili, TP)        -Medication use and side effects discussed with the patient. Patient is in complete understanding and agreement with plan.       (E66.01) Morbid obesity (H)  Comment: see above   Plan: BARIATRIC ADULT REFERRAL            (J30.2) Seasonal allergic rhinitis, unspecified trigger  Comment:   Plan: fluticasone (FLONASE) 50 MCG/ACT nasal spray            (Z12.5) Screening for prostate cancer  Comment:   Plan: Prostate spec antigen screen            (R06.83) Snoring  Comment: given bmi, concerned for jan.   Plan: SLEEP EVALUATION & MANAGEMENT REFERRAL - ADULT         -[facility]          [phone] (Age 18 and up)              COUNSELING:   Reviewed preventive health counseling, as reflected in patient instructions       Regular exercise       Healthy diet/nutrition       Colon cancer screening       Prostate cancer screening    BP Readings from Last 1 Encounters:   04/24/19 126/69     Estimated body mass index is 58.96 kg/m  as calculated from the following:    Height as of this encounter: 1.918 m (6' 3.5\").    Weight as of this encounter: 216.8 kg (478 lb).      Weight management plan: Patient referred to endocrine and/or weight management specialty     reports that he has never smoked. He has never used smokeless tobacco.      Counseling Resources:  ATP IV Guidelines  Pooled Cohorts Equation Calculator  FRAX Risk Assessment  ICSI Preventive Guidelines  Dietary Guidelines for Americans, 2010  USDA's MyPlate  ASA Prophylaxis  Lung CA Screening    Robel Morris PA-C  [facility] " [place]  Answers for HPI/ROS submitted by the patient on 4/24/2019   Annual Exam:  Would you say that in general your health is: : Fair  Now thinking about your physical health, which includes physical illness and injury; for how many days during the past 30 days was your physical health not good?: 3  Now thinking about your mental health, which includes stress, depression, and problems with emotions; for how many days during the past 30 days was your mental health not good?: 5  During the past 30 days, for about how many days did poor physical or mental health keep you from doing your usual activities, such as self-care, work, or recreation?: 1

## 2019-04-25 LAB
ALBUMIN SERPL-MCNC: 3.7 G/DL (ref 3.4–5)
ALP SERPL-CCNC: 82 U/L (ref 40–150)
ALT SERPL W P-5'-P-CCNC: 27 U/L (ref 0–70)
ANION GAP SERPL CALCULATED.3IONS-SCNC: 5 MMOL/L (ref 3–14)
AST SERPL W P-5'-P-CCNC: 16 U/L (ref 0–45)
BILIRUB SERPL-MCNC: 0.4 MG/DL (ref 0.2–1.3)
BUN SERPL-MCNC: 17 MG/DL (ref 7–30)
CALCIUM SERPL-MCNC: 8.8 MG/DL (ref 8.5–10.1)
CHLORIDE SERPL-SCNC: 105 MMOL/L (ref 94–109)
CHOLEST SERPL-MCNC: 145 MG/DL
CO2 SERPL-SCNC: 29 MMOL/L (ref 20–32)
CREAT SERPL-MCNC: 0.93 MG/DL (ref 0.66–1.25)
GFR SERPL CREATININE-BSD FRML MDRD: >90 ML/MIN/{1.73_M2}
GLUCOSE SERPL-MCNC: 93 MG/DL (ref 70–99)
HCV AB SERPL QL IA: NONREACTIVE
HDLC SERPL-MCNC: 38 MG/DL
HIV 1+2 AB+HIV1 P24 AG SERPL QL IA: NONREACTIVE
LDLC SERPL CALC-MCNC: 89 MG/DL
NONHDLC SERPL-MCNC: 107 MG/DL
POTASSIUM SERPL-SCNC: 3.8 MMOL/L (ref 3.4–5.3)
PROT SERPL-MCNC: 7.5 G/DL (ref 6.8–8.8)
PSA SERPL-ACNC: 0.81 UG/L (ref 0–4)
SODIUM SERPL-SCNC: 139 MMOL/L (ref 133–144)
TRIGL SERPL-MCNC: 90 MG/DL

## 2019-06-12 ENCOUNTER — OFFICE VISIT (OUTPATIENT)
Dept: SURGERY | Facility: CLINIC | Age: 59
End: 2019-06-12
Payer: COMMERCIAL

## 2019-06-12 VITALS
HEIGHT: 76 IN | OXYGEN SATURATION: 91 % | WEIGHT: 315 LBS | BODY MASS INDEX: 38.36 KG/M2 | HEART RATE: 67 BPM | DIASTOLIC BLOOD PRESSURE: 69 MMHG | SYSTOLIC BLOOD PRESSURE: 138 MMHG

## 2019-06-12 DIAGNOSIS — I10 ESSENTIAL HYPERTENSION WITH GOAL BLOOD PRESSURE LESS THAN 140/90: ICD-10-CM

## 2019-06-12 DIAGNOSIS — M79.672 PAIN IN BOTH FEET: ICD-10-CM

## 2019-06-12 DIAGNOSIS — I89.0 LYMPHEDEMA: ICD-10-CM

## 2019-06-12 DIAGNOSIS — G47.33 OSA (OBSTRUCTIVE SLEEP APNEA): ICD-10-CM

## 2019-06-12 DIAGNOSIS — E66.01 MORBID OBESITY WITH BMI OF 50.0-59.9, ADULT (H): Primary | ICD-10-CM

## 2019-06-12 DIAGNOSIS — M79.671 PAIN IN BOTH FEET: ICD-10-CM

## 2019-06-12 DIAGNOSIS — E78.5 HYPERLIPIDEMIA LDL GOAL <160: ICD-10-CM

## 2019-06-12 PROCEDURE — 99204 OFFICE O/P NEW MOD 45 MIN: CPT | Performed by: PHYSICIAN ASSISTANT

## 2019-06-12 ASSESSMENT — MIFFLIN-ST. JEOR: SCORE: 3048.66

## 2019-06-12 NOTE — PATIENT INSTRUCTIONS

## 2019-06-12 NOTE — PROGRESS NOTES
"    New Medical Weight Management Consult    PATIENT:  Da Mustafa  MRN:         [id number]  :         1960  SHWETA:         2019    Dear Alexandra, Robel Hinojosa,    I had the pleasure of seeing your patient, Da Mustafa.  Full intake/assessment done to determine barriers to weight loss success and develop a treatment plan.  Da Mustafa is a 58 year old male interested in treatment of medical problems associated with weight.  His weight today is 468 lbs 8 oz, Body mass index is 57.79 kg/m ., and he has the following co-morbidities:     2019   I have the following co-morbidities associated with obesity: High Blood Pressure, Sleep Apnea       Patient Goals Reviewed With Patient 2019   I am interested in attaining a healthier weight to diminish current health problems related to co-morbid conditions: Yes   I am interested in attaining a healthier weight in order to prevent future health problems: Yes       Referring Provider 2019   Please name the provider who referred you to Medical Weight Management.  If you do not know, please answer: \"I Don't Know\". Robel Morris       Wt Readings from Last 4 Encounters:   19 (!) 468 lb 8 oz (212.5 kg)   19 (!) 478 lb (216.8 kg)   18 (!) 461 lb (209.1 kg)   18 (!) 480 lb (217.7 kg)       Weight History Reviewed With Patient 2019   How concerned are you about your weight? Very Concerned   Would you describe your weight gain as gradual? Yes   I became overweight: As a Child   The following factors have contributed to my weight gain:  Eating Wrong Types of Food, Eating Too Much, Lack of Exercise, Genetic (Runs in the Family)   I have tried the following methods to lose weight: Watching Portions or Calories, Atkins-type Diet (Low Carb/High Protein)   I have the following family history of obesity/being overweight:  My mother is overwieght, One or more of my siblings are overweight, Many of my relatives are " overweight   Has anyone in your family had weight loss surgery? No     Approximate weight age 20 = 180lbs    Diet Recall Reviewed With Patient 6/11/2019   How many glasses of juice do you drink in a typical day? 0   How many of glasses of milk do you drink in a typical day? 0   How many 8oz glasses of sugar containing drinks such as Alfred-Aid/sweet tea do you drink in a day? 0   How many cans/bottles of sugar pop/soda/tea/sports drinks do you drink in a day? 0   How many cans/bottles of diet pop/soda/tea or sports drink do you drink in a day? 8        Diet mountain dew.     How often do you have a drink of alcohol? Monthly or Less   If you do drink, how many drinks might you have in a day? 1 or 2.         B: Gets up at 6 am.  Eats breakfast 6:20 am.  Will have 2 scrambled eggs with ham and cheese. Strawberries.   Morning snack: 2 mozarella sticks and turkey sausage and celery sticks with dips  L: Salad with cherry tomatoes, radishes and deli meat.  With Caesar dressing.  Will have plain yogurt with stevia.  Afternoon snack : same as morning  D:  Grilled pork roast with green beans with potatoes.    Does not usually eat after dinner    Eating Habits Reviewed With Patient 6/11/2019   Generally, my meals include foods like these: bread, pasta, rice, potatoes, corn, crackers, sweet dessert, pop, or juice. A Few Times a Week   Generally, my meals include foods like these: fried meats, brats, burgers, french fries, pizza, cheese, chips, or ice cream. A Few Times a Week   Eat fast food (like McDonalds, [employer], Taco Bell). Once weekly usually dinner   Eat at a buffet or sit-down restaurant. Once every other month   Eat most of my meals in front of the TV or computer. Everyday   Often skip meals, eat at random times, have no regular eating times. sometimes   Rarely sit down for a meal but snack or graze throughout.  Never   Eat extra snacks between meals. Everyday   Eat most of my food at the end of the day. A Few Times  a Week   Eat in the middle of the night or wake up at night to eat. Never   Eat extra snacks to prevent or correct low blood sugar. A Few TImes a Week.  Usually afternoon   Eat to prevent acid reflux or stomach pain. Never   Worry about not having enough food to eat. Never   Have you been to the food shelf at least a few times this year? No   I eat when I am depressed, stressed, anxious, or bored. A Few Times a Week   I eat when I am happy or as a reward. A Few Times a Week   I feel hungry all the time even if I just have eaten. Half of the Week   Feeling full is important to me. A Few Times a Week   Once I start eating, it is hard to stop. A Few Times a Week   I finish all the food on my plate even if I am already full. weekly   I can't resist eating delicious food or walk past the good food/smell. A Few Times a Week   I eat/snack without noticing that I am eating. Never   I eat when I am preparing the meal. Never   I eat more than usual when I see others eating. A Few Times a Week   I have trouble not eating sweets, ice cream, cookies, or chips if they are around the house. A Few Times a Week   I think about food all day. Never   What foods, if any, do you crave? Sweets/Candy/Chocolate   I feel out of control when eating. Never   I eat a large amount of food, like a loaf of bread, a box of cookies, a pint/quart of ice cream, all at once. Never   I eat a large amount of food even when I am not hungry. Monthly   I eat rapidly. Monthly   I eat alone because I feel embarrassed and do not want others to see how much I have eaten. Never   I eat until I am uncomfortably full. Never   I feel bad, disgusted, or guilty after I overeat. Never   I make myself vomit what I have eaten or use laxatives to get rid of food. Never       Activity/Exercise History Reviewed With Patient 6/11/2019   How much of a typical 12 hour day do you spend sitting? Most of the Day   How much of a typical 12 hour day do you spend lying down? Less  Than Half the Day   How much of a typical day do you spend walking/standing? Less Than Half the Day   How many hours (not including work) do you spend on the TV/Video Games/Computer/Tablet/Phone? 6 Hours or More   How many times a week are you active for the purpose of exercise? Never   How many total minutes do you spend doing some activity for the purpose of exercising when you exercise? None   What keeps you from being more active? Pain, Lack of Time       PAST MEDICAL HISTORY:  Past Medical History:   Diagnosis Date     Class 3 obesity due to excess calories without serious comorbidity with body mass index (BMI) of 50.0 to 59.9 in adult 12/27/2017     Edema      Essential hypertension with goal blood pressure less than 140/90 7/25/2016     Essential hypertension, benign      Hyperlipidemia LDL goal <160 7/28/2015     Hypertension goal BP (blood pressure) < 140/90 4/25/2013     Obesity, unspecified      Seasonal allergic rhinitis 5/19/2014     Sleep apnea        Work/Social History Reviewed With Patient 6/11/2019   My employment status is: Full-Time   My job is: Computer Professional   How much of your job is spent on the computer or phone? 100%   What is your marital status? /In a Relationship   Do you have children? No   If you have children, are they overweight? N/A       Mental Health History Reviewed With Patient 6/11/2019   Have you ever been physically or sexually abused? No   How often in the past 2 weeks have you felt little interest or pleasure in doing things? Not at all   Over the past 2 weeks how often have you felt down, depressed, or hopeless? Not at all       Sleep History Reviewed With Patient 6/11/2019   How many hours do you sleep at night? 6   Do you think that you snore loudly or has anybody ever heard you snore loudly (louder than talking or so loud it can be heard behind a shut door)? Yes   Has anyone seen or heard you stop breathing during your sleep? Yes   Do you often feel tired,  "fatigued, or sleepy during the day? Yes       MEDICATIONS:   Current Outpatient Medications   Medication Sig Dispense Refill     fluticasone (FLONASE) 50 MCG/ACT nasal spray SPRAY 2 SPRAYS INTO BOTH NOSTRILS DAILY 16 mL 2     furosemide (LASIX) 40 MG tablet Take 1 tablet (40 mg) by mouth daily 90 tablet 1     garlic 150 MG TABS tablet Take 150 mg by mouth daily       glucosamine-chondroitin 500-400 MG CAPS per capsule Take 1 capsule by mouth daily       lisinopril (PRINIVIL/ZESTRIL) 40 MG tablet Take 1 tablet (40 mg) by mouth daily 90 tablet 3     loratadine (CLARITIN) 10 MG tablet Take 10 mg by mouth daily       multivitamin w/minerals (MULTI-VITAMIN) tablet Take 1 tablet by mouth daily       omega 3 1000 MG CAPS        order for DME BLE knee high custom compression stockings 20-30 or 30-40 mm Hg compression stockings 1 each 0     ROS  General  Fatigue: Sometimes  Sleep Quality: ok   He is a light sleeper  HEENT  Hx of glaucoma: No  Vision changes: No  Cardiovascular  Chest Pain with Exertion: sometimes  Palpitations: possibly  Hx of heart disease: No  Hx of PVD possibly  Pulmonary  Shortness of breath at rest: No  Shortness of breath with exertion: yes  Snoring: yes.  Has MARLY  Gastrointestinal  Heartburn: No.  Might take a tums  Abdominal pain: No  Gastrourinary  History of kidney stones: No  Psychiatric  Moods Stable: Yes  History of drug abuse: No  Endocrine  Polydipsia: No  Polyuria: No  Neurologic:  Hx of seizures: No  Hx of paresthesias: No    ALLERGIES:   Allergies   Allergen Reactions     No Known Drug Allergies        PHYSICAL EXAM:  /69 (BP Location: Left arm, Patient Position: Sitting, Cuff Size: Adult Large)   Pulse 67   Ht 6' 3.5\" (1.918 m)   Wt (!) 468 lb 8 oz (212.5 kg)   SpO2 91%   BMI 57.79 kg/m     A & O x 3  HEENT: NCAT, mucous membranes moist  Respirations unlabored  Location of obesity: Mixed Obesity    ASSESSMENT:  Da is a patient with mature onset morbid obesity with " significant element of familial/genetic influence and with current health consequences. He does need aggressive weight loss plan due to comorbidities.  Da Mustafa eats most meals in front of TV and tends to snack/graze throughout day, rarely sitting to eat a true meal.    His problem is complicated by poor lifestyle choices and inability to exercise much.     His ability to lose weight is impacted by physical impairment.    PLAN:      MARLY - encouraged patient to check back with the sleep center  Dietitian education  Food journal   Ordered TSH and CBC  Will start substituting water for diet mtn dew.  Will try to get in at least 24 oz of water  Will get fitted for shoes that will help him with weight loss    Discussed weight loss medications.  At this time patient has elected to not take.          RTC:    4 weeks diet  8 weeks PA-C      TIME: 50 min spent on evaluation, management, counseling, education, & motivational interviewing with greater than 50 % of the total time was spent on counseling and coordinating care    Sincerely,    Rabia Tena PA-C

## 2019-07-05 ENCOUNTER — ALLIED HEALTH/NURSE VISIT (OUTPATIENT)
Dept: SURGERY | Facility: CLINIC | Age: 59
End: 2019-07-05
Payer: COMMERCIAL

## 2019-07-05 VITALS — WEIGHT: 315 LBS | HEIGHT: 76 IN | BODY MASS INDEX: 38.36 KG/M2

## 2019-07-05 DIAGNOSIS — E66.01 MORBID OBESITY (H): ICD-10-CM

## 2019-07-05 PROCEDURE — 97802 MEDICAL NUTRITION INDIV IN: CPT | Performed by: DIETITIAN, REGISTERED

## 2019-07-05 ASSESSMENT — MIFFLIN-ST. JEOR: SCORE: 3024.62

## 2019-07-05 NOTE — PROGRESS NOTES
"MEDICAL WEIGHT LOSS INITIAL EVALUATION  DIAGNOSIS:  Class III Obesity    NUTRITION HISTORY:  Breakfast: 2 scrambled eggs with ham or breakfast sandwich on sandwich thin (2 egg/ham) @ 6;20 a.m.  Lunch: lettuce salad, salmon, peppers, Caesars dressing,  non fat Greek yogurt + Stevia, frozen berries  @ 11:30-12:30 (eats at desk)  Dinner: chicken or pork, garlic mashed potatoes, green beans or 1.5 BLT's, birthday cake or ice cream sometimes  Snacks: 2 turkey sticks, 1 mozzarella sticks-2X per day,1 apple/ free fruit at work/ sometimes 1 handful of peanuts  Beverage choices: 20-40 oz water, ~ 113 oz Diet Mountain Dew, ETOH- 6 pack of wine coolers per year  Dining out: 1X per week-take out pascale pow chicken (not breaded), low mien, white rice  Binge eating: denies  Emotional eating: yes, but patient does not feel it is an issue (sometimes related to boredom/sometimes using food as a reward)  Night time eating: denies  Exercise: no intentional- has lymphedema   Other: Spouse, Janna was present for the visit. Spouse does all of the cooking at home and generally enjoys cooking. Patient Started tracking intake since seeing PA-C.  Patient likes most foods, but does not care for seafood. Spouse had many appropriate questions. Congratulated pt on ~ 5 pound weight loss since 6/12/19 visit with PA-C.    ANTHROPOMETRICS:  Height: 6'3.5\"  Weight: 463.2 lb   BMI:  57.13 kg/m2  NUTRITION DIAGNOSIS:   Obese, class III related to excess energy intake as evidence by BMI of  57.13  NUTRITION INTERVENTIONS  Nutrition Prescription:  Recommend modified energy- nutrient intake  Implementation:  Nutrition Education (Content):   Discussed portion sizes (using food models)   Discussed the importance of developing an exercise regimen   Briefly discussed surgical weight loss   Provided my plate guidelines  Nutrition Education (Application):     Patient to practice goals as stated below    Patient verbalizes understanding of diet by stating he can " try to move more  Anticipate fair-good compliance  Goals:  Walk for 10 minutes 3X per week  Look into chair exercises (U-tube or physical therapy referral)  Increase water intake/ determine how much is consumed per day (to help cut back on pop)  Switch to a 9-10 inch plate  Research weight loss surgery (ASMBS web site provided)-discussed/ not written    FOLLOW UP AND MONITORING:      Other  - follow up in 8 weeks.     TIME SPENT WITH PATIENT:   60 minutes   Chidi Feliz RD, LD  [facility]  [phone]

## 2019-08-27 ENCOUNTER — MYC MEDICAL ADVICE (OUTPATIENT)
Dept: FAMILY MEDICINE | Facility: CLINIC | Age: 59
End: 2019-08-27

## 2019-08-27 DIAGNOSIS — I89.0 LYMPHEDEMA: ICD-10-CM

## 2019-08-27 DIAGNOSIS — R60.0 PERIPHERAL EDEMA: ICD-10-CM

## 2019-08-27 RX ORDER — SILVER SULFADIAZINE 10 MG/G
CREAM TOPICAL 2 TIMES DAILY
Qty: 85 G | Refills: 1 | Status: ON HOLD | OUTPATIENT
Start: 2019-08-27 | End: 2020-07-06

## 2019-08-29 ENCOUNTER — OFFICE VISIT (OUTPATIENT)
Dept: SURGERY | Facility: CLINIC | Age: 59
End: 2019-08-29
Payer: COMMERCIAL

## 2019-08-29 VITALS
HEIGHT: 76 IN | HEART RATE: 71 BPM | WEIGHT: 315 LBS | SYSTOLIC BLOOD PRESSURE: 142 MMHG | DIASTOLIC BLOOD PRESSURE: 78 MMHG | BODY MASS INDEX: 38.36 KG/M2 | OXYGEN SATURATION: 98 %

## 2019-08-29 DIAGNOSIS — I10 ESSENTIAL HYPERTENSION WITH GOAL BLOOD PRESSURE LESS THAN 140/90: ICD-10-CM

## 2019-08-29 DIAGNOSIS — M72.2 PLANTAR FASCIITIS: ICD-10-CM

## 2019-08-29 DIAGNOSIS — E78.5 HYPERLIPIDEMIA LDL GOAL <160: ICD-10-CM

## 2019-08-29 DIAGNOSIS — E66.01 MORBID OBESITY WITH BMI OF 50.0-59.9, ADULT (H): Primary | ICD-10-CM

## 2019-08-29 PROCEDURE — 99213 OFFICE O/P EST LOW 20 MIN: CPT | Performed by: PHYSICIAN ASSISTANT

## 2019-08-29 ASSESSMENT — MIFFLIN-ST. JEOR: SCORE: 2990.15

## 2019-08-29 NOTE — PATIENT INSTRUCTIONS
DEONTE to follow up with Primary Care provider regarding elevated blood pressure.      Plan:  Get labs done   See primary regarding foot pain   Diet education  MARLY - encouraged patient to check back with the sleep center    Eat Better ? Move More ? Live Well    Eat 3 nutrient-rich meals each day     Don t skip meals--it will cause you to overeat later in the day!     Eating fiber (vegetables/fruits/whole grains) and protein with meals helps you stay full longer     Choose foods with less than 10 grams of sugar and 5 grams of fat per serving to prevent excess calories and weight re-gain   Eat around the same times each day to develop a routine eating schedule    Avoid snacking unless physically hungry.   Planned snacks: 1-2 times per day and no more than 150 calories    Eat protein first    Protein helps with healing, maintaining adequate muscle mass, reducing hunger and optimizing nutritional status    Aim for 60-80 grams of protein per day   Fill up on Fiber    Fiber comes from plants--fruits, veggies, whole grains, nuts/seeds and beans    Fiber is low in calories, high in phytonutrients and helps you stay full longer    Aim for 25-35 grams per day by eating fiber with meals and snacks  Eat [id number]    Take 20-30 minutes to eat each meal by taking small bites, chewing foods to applesauce consistency or 20-30 times before you swallow    Eating foods too fast can delay satiety/fullness signals and increase overeating   ? Slow down your eating by using toddler utensils, putting your fork/spoon down between bites and not watching TV or emailing during meals!   Keep a Journal          Writing down what you eat, how you feel and when you are active helps you identify new changes to work on from week to week          Look for ways to cut 100 calories from your current diet 2-3 times per day  Drink 64 ounces of 0-Calorie drinks between meals    Water    Zero calorie Propel  or Vitamin Water      SoBe Lifewater  Zero  Calories    Crystal Light , Sugar-Free Alfred-Aid , and other sugar-free lemonade or flavored gray    Keep Caffeine to less than 300mg per day ie: 3-6oz cups coffee     Work up to 45-60 minutes of physical activity most days of the week    Helps with losing weight and prevent regaining those extra pounds!     Do a combo of cardio (walking/water exercises) and strength training (lifting weights/Vinyasa yoga)    Avoid Mindless Eating    Be present when you eat--take note of the smell, taste and quality of your food    Make a list of alternative activities you could do to prevent eating out of boredom/stress  ? Go for a walk, call a friend, chew gum, paint your nails, re-organize the garage, etc

## 2019-08-29 NOTE — PROGRESS NOTES
[facility] Follow up Visit   Date of visit: 8/29/2019  Physician: Rabia Tena PA-C,   Primary Care is Robel Morris.  Da Mustafa   58 year old  male     Initial Weight: 468 lbs 8 oz,  Initial BMI: 57.79 kg/m   Today's Weight:   Wt Readings from Last 1 Encounters:   08/29/19 (!) 455 lb 9.6 oz (206.7 kg)     Body mass index is 56.19 kg/m .  Wt change since last visit (lbs): -7.6  Cumulative weight loss (lbs): 12.9     Assessment and Plan   Assessment: Da is a 58 year old year old male who presents for medical weight management.  Not sure how he feels about his lifestyle changes. His wife has been making most of his meals and prepackages them if she has to leave.  He has not been fitted for shoes says that his current shoes are ok but is complaining of plantar fasciitis. This pain does keep him from walking more than he has to.    Has not checked back in with sleep center. Not sure how helpful the dietitian visit was.    Plan:  Get labs done - ordered at last visit  See primary regarding foot pain AND elevated blood pressure  Diet education  MARLY - encouraged patient to check back with the sleep center    Diagnosis:   Morbid Obesity  Hypertension   Hyperlipidemia  Plantar fasciitis  MARLY    Follow up:  1 month diet   2 month PA            INTERIM HISTORY  Congratulated on weight loss!  Has increased water but has not decreased diet Mtn Dew.  Encouraged him to seek treatment for his feet concerns as this makes it harder for him to exercise or walk.     DIETARY HISTORY  Meals Per Day: 3  Eating Protein First?: yes  Food Diary:   B:breakfast sandwich with eggs and Citizen of [place] with fruit.  - homemade.  Water  L:large salad with steak or chicken.  Assorted veggies. Light Caesar dressing  D: pre-portioned container with meatloaf, mashed potatoes with broccoli.  Made by wife  Snacks Per Day: twice.  Not after dinner  Typical Snack: turkey sticks and greek yogurt  Fluid Intake: 60 oz  "and still a lot of diet Mt Dew  Portion Control: better. Wife   Calorie Containing Beverages: No  Typical Protein Food Choices: meat    Positive Changes Since Last Visit: increased water  Struggling With: activity    Getting Adequate Protein: yes    PHYSICAL ACTIVITY PATTERNS:  Cardiovascular: No  Strength Training: No           Patient Profile   Social History     Social History Narrative     Not on file        Past Medical History   Past Medical History:   Diagnosis Date     Class 3 obesity due to excess calories without serious comorbidity with body mass index (BMI) of 50.0 to 59.9 in adult 12/27/2017     Edema      Essential hypertension with goal blood pressure less than 140/90 7/25/2016     Essential hypertension, benign      Hyperlipidemia LDL goal <160 7/28/2015     Hypertension goal BP (blood pressure) < 140/90 4/25/2013     Obesity, unspecified      Seasonal allergic rhinitis 5/19/2014     Sleep apnea      Patient Active Problem List   Diagnosis     Edema     Family history of diabetes mellitus     Seasonal allergic rhinitis     Hyperlipidemia LDL goal <160     Essential hypertension with goal blood pressure less than 140/90     Arthritis of left foot     Class 3 obesity due to excess calories without serious comorbidity with body mass index (BMI) of 50.0 to 59.9 in adult     Venous stasis dermatitis of both lower extremities     Peripheral edema     Lymphedema     Morbid obesity (H)       Past Surgical History  He has a past surgical history that includes NONSPECIFIC PROCEDURE and Colonoscopy (12/4/2013).     Examination   BP (!) 142/78   Pulse 71   Ht 6' 3.5\" (1.918 m)   Wt (!) 455 lb 9.6 oz (206.7 kg)   SpO2 98%   BMI 56.19 kg/m      General:  Alert and ambulatory, NAD  HEENT:  No conjunctival pallor, moist mucous Membranes, neck is without LAD  Pulmonary:  Normal respiratory effort, no cough, no audible wheezes/crackles.  CV:  Regular rate and Rhythm, no murmurs           Counseling:   We " reviewed the important post op bariatric recommendations:  -eating 3 meals daily  -eating protein first, getting >60gm protein daily  -eating slowly, chewing food well  -avoiding/limiting calorie containing beverages  -limiting starchy vegetables and carbohydrates, choosing wheat, not white with breads,   crackers, pastas, kendy, bagels, tortillas, rice  -limiting restaurant or cafeteria eating to twice a week or less    We discussed the importance of restorative sleep and stress management in maintaining a healthy weight.  We discussed the National Weight Control Registry healthy weight maintenance strategies and ways to optimize metabolism.  We discussed the importance of physical activity including cardiovascular and strength training in maintaining a healthier weight.    > 15 min spent with patient, > 50% spent in counseling         Rabia Tena MS, PA-C

## 2019-09-09 ENCOUNTER — MYC MEDICAL ADVICE (OUTPATIENT)
Dept: FAMILY MEDICINE | Facility: CLINIC | Age: 59
End: 2019-09-09

## 2019-09-10 ENCOUNTER — ALLIED HEALTH/NURSE VISIT (OUTPATIENT)
Dept: SURGERY | Facility: CLINIC | Age: 59
End: 2019-09-10
Payer: COMMERCIAL

## 2019-09-10 VITALS — HEIGHT: 76 IN | WEIGHT: 315 LBS | BODY MASS INDEX: 38.36 KG/M2

## 2019-09-10 DIAGNOSIS — E66.01 MORBID OBESITY (H): ICD-10-CM

## 2019-09-10 PROCEDURE — 97803 MED NUTRITION INDIV SUBSEQ: CPT | Performed by: DIETITIAN, REGISTERED

## 2019-09-10 ASSESSMENT — MIFFLIN-ST. JEOR: SCORE: 2962.92

## 2019-09-10 NOTE — PROGRESS NOTES
MEDICAL WEIGHT LOSS FOLLOW UP      DIAGNOSIS:  Class III Obesity    NUTRITION HISTORY:    Breakfast: egg bake with veggies/ turkey sausage or ham, fresh berries     Lunch:  Lettuce salad with chicken or beef fajita strips, smaller portion regular salad dressing, Greek yogurt with frozen berries, berries     Dinner:  Protein , grain, veggies or take out Asian take out (chicken, grilled veggies, white rice)     Snacks:  Turkey jerky, raw veggies and  string cheese-2X per day     Beverage Choices:  60 oz water, ~4-6 cans diet Mountain Dew, no ETOH     Exercise: none     Other: Spouse, Janna was present for the visit. Pt reports more chaos due to spouse caring for mother-in-law. Eating more pre-portioned meals due to wife not home as much.    ANTHROPOMETRICS:    Initial Weight: 463.2 pounds      Current Weight:  450.7 pounds    Weight Change: decrease 12.5 pounds    BMI: 55.59 kg/m2    MEDICATIONS:  None for weight loss      EVALUATION/PROGRESS TOWARDS GOALS:  Previous Goals:  Walk for 10 minutes 3X per week-not met  Look into chair exercises (U-tube or physical therapy referral)-not met  Increase water intake/ determine how much is consumed per day (to help cut back on pop)-met  Switch to a 9-10 inch plate-met  Research weight loss surgery (ASMBS web site provided)-discussed/ not written -not met    Previous Nutrition Diagnosis:  Obese class III related to excess energy intake as evidence by BMI of 57.13 kg/m2     Current Nutrition Diagnosis:   Obese class III related to excess energy intake as evidence by BMI of 55.59 kg/m2  No change      INTERVENTION:    Nutrition Prescription:  Recommend modified nutrient intake by decreasing energy intake    Implementation:    Meals and Snacks: 3/2    Nutrition Education (Content):    Discussed previous goals and determined new goals    Encourage physical activity    Supported patient in attempted weight loss and behavior changes     Congratulated patient on successful weight loss      Anticipate fair-good compliance    Goals:  See MD for foot pain (limits activity)  Continue to track intake in not book  Consider attending a surgical weight loss information meeting (written information provided)    Follow Up/Monitoring:  Other  -  patient to follow up in 8 weeks    Time Spent With Patient:  35 Minutes    Chidi Feliz RD, LD  [facility]  [phone]

## 2019-09-20 DIAGNOSIS — E66.01 MORBID OBESITY WITH BMI OF 50.0-59.9, ADULT (H): ICD-10-CM

## 2019-09-20 LAB
ERYTHROCYTE [DISTWIDTH] IN BLOOD BY AUTOMATED COUNT: 12.9 % (ref 10–15)
HCT VFR BLD AUTO: 40.6 % (ref 40–53)
HGB BLD-MCNC: 13.6 G/DL (ref 13.3–17.7)
MCH RBC QN AUTO: 30.5 PG (ref 26.5–33)
MCHC RBC AUTO-ENTMCNC: 33.5 G/DL (ref 31.5–36.5)
MCV RBC AUTO: 91 FL (ref 78–100)
PLATELET # BLD AUTO: 197 10E9/L (ref 150–450)
RBC # BLD AUTO: 4.46 10E12/L (ref 4.4–5.9)
TSH SERPL DL<=0.005 MIU/L-ACNC: 1.87 MU/L (ref 0.4–4)
WBC # BLD AUTO: 7.5 10E9/L (ref 4–11)

## 2019-09-20 PROCEDURE — 85027 COMPLETE CBC AUTOMATED: CPT | Performed by: PHYSICIAN ASSISTANT

## 2019-09-20 PROCEDURE — 84443 ASSAY THYROID STIM HORMONE: CPT | Performed by: PHYSICIAN ASSISTANT

## 2019-09-20 PROCEDURE — 36415 COLL VENOUS BLD VENIPUNCTURE: CPT | Performed by: PHYSICIAN ASSISTANT

## 2019-10-11 ENCOUNTER — TELEPHONE (OUTPATIENT)
Dept: FAMILY MEDICINE | Facility: CLINIC | Age: 59
End: 2019-10-11

## 2019-10-11 NOTE — TELEPHONE ENCOUNTER
Reason for Call:  Form, our goal is to have forms completed with 72 hours, however, some forms may require a visit or additional information.    Type of letter, form or note:  medical    Who is the form from?: [employer] (if other please explain)    Where did the form come from: form was faxed in    What clinic location was the form placed at?: [facility]     Where the form was placed: [facility]  Box/Folder    What number is listed as a contact on the form?: [phone]       Additional comments: needs a signature for Rx please fax when done to [phone]    Call taken on 10/11/2019 at 7:17 AM by Yris Escobar

## 2019-10-20 DIAGNOSIS — I10 ESSENTIAL HYPERTENSION WITH GOAL BLOOD PRESSURE LESS THAN 140/90: ICD-10-CM

## 2019-10-20 DIAGNOSIS — R60.0 PERIPHERAL EDEMA: ICD-10-CM

## 2019-10-20 NOTE — LETTER
[facility]  [address] [place], [address]  [phone]        October 28, 2019    Da Mustafa                                                                                                                                                       [address] [address]            Dear Da,      We recently received a call from your pharmacy requesting a refill of lasix.      A review of your chart indicates that a lab only appointment is required.  Please call the clinic at [phone] to schedule your appointment.     We have authorized 9- day refill of your medication.     Taking care of your health is important to us and ongoing visits with your provider are vital to your care. We look forward to seeing you in the near future.         Sincerely,     [facility]

## 2019-10-21 RX ORDER — FUROSEMIDE 40 MG
TABLET ORAL
Qty: 90 TABLET | Refills: 0 | Status: SHIPPED | OUTPATIENT
Start: 2019-10-21 | End: 2020-01-27

## 2019-10-21 NOTE — TELEPHONE ENCOUNTER
"Requested Prescriptions   Pending Prescriptions Disp Refills     furosemide (LASIX) 40 MG tablet [Pharmacy Med Name: FUROSEMIDE 40 MG TABLET]  Last Written Prescription Date:  5/17/2019  Last Fill Quantity: 16 mL,  # refills: 2   Last office visit: 4/24/2019 with prescribing provider:  Alexandra   Future Office Visit:     90 tablet 1     Sig: TAKE 1 TABLET BY MOUTH EVERY DAY       Diuretics (Including Combos) Protocol Failed - 10/20/2019  8:46 AM        Failed - Blood pressure under 140/90 in past 12 months     BP Readings from Last 3 Encounters:   08/29/19 (!) 142/78   06/12/19 138/69   04/24/19 126/69                 Passed - Recent (12 mo) or future (30 days) visit within the authorizing provider's specialty     Patient has had an office visit with the authorizing provider or a provider within the authorizing providers department within the previous 12 mos or has a future within next 30 days. See \"Patient Info\" tab in inbasket, or \"Choose Columns\" in Meds & Orders section of the refill encounter.              Passed - Medication is active on med list        Passed - Patient is age 18 or older        Passed - Normal serum creatinine on file in past 12 months     Recent Labs   Lab Test 04/24/19  1009   CR 0.93              Passed - Normal serum potassium on file in past 12 months     Recent Labs   Lab Test 04/24/19  1009   POTASSIUM 3.8                    Passed - Normal serum sodium on file in past 12 months     Recent Labs   Lab Test 04/24/19  1009                   "

## 2019-10-21 NOTE — TELEPHONE ENCOUNTER
Prescription approved per [facility] Refill Protocol.  Routing refill request to provider for review/approval because: Last ov NOTE  PLEASE ENTER DESIRED LABS AND SEND TO SCHEDULING       Return in about 6 months (around 10/24/2019) for Lab Work.

## 2019-10-22 ENCOUNTER — OFFICE VISIT (OUTPATIENT)
Dept: SURGERY | Facility: CLINIC | Age: 59
End: 2019-10-22
Payer: COMMERCIAL

## 2019-10-22 VITALS
SYSTOLIC BLOOD PRESSURE: 138 MMHG | WEIGHT: 315 LBS | BODY MASS INDEX: 38.36 KG/M2 | DIASTOLIC BLOOD PRESSURE: 82 MMHG | OXYGEN SATURATION: 100 % | HEIGHT: 76 IN | HEART RATE: 66 BPM

## 2019-10-22 DIAGNOSIS — G47.33 OSA (OBSTRUCTIVE SLEEP APNEA): ICD-10-CM

## 2019-10-22 DIAGNOSIS — E66.01 MORBID OBESITY WITH BMI OF 50.0-59.9, ADULT (H): Primary | ICD-10-CM

## 2019-10-22 DIAGNOSIS — I10 ESSENTIAL HYPERTENSION WITH GOAL BLOOD PRESSURE LESS THAN 140/90: ICD-10-CM

## 2019-10-22 DIAGNOSIS — E78.5 HYPERLIPIDEMIA LDL GOAL <160: ICD-10-CM

## 2019-10-22 PROCEDURE — 99213 OFFICE O/P EST LOW 20 MIN: CPT | Performed by: PHYSICIAN ASSISTANT

## 2019-10-22 ASSESSMENT — MIFFLIN-ST. JEOR: SCORE: 2897.15

## 2019-10-22 NOTE — PROGRESS NOTES
2019      Return Medical Weight Management Note     Da Mustafa  MRN:  [id number]  :  1960      Dear Robel Morris,    I had the pleasure of seeing your patient Da Mustafa.  He is a 59 year old male who I am continuing to see for treatment of obesity related to:       2019   I have the following co-morbidities associated with obesity: High Blood Pressure, Sleep Apnea       INTERVAL HISTORY:  Congratulated him on his weight loss. Does deal with lymphedema and it has been under better control.  Wife continues to prepare meals.  Patient has been eating out a bit more due to his wife taking care of his mother in law moving.  He has not made an appointment with a podiatrist to evaluate for orthotics.  The orthotics he currently has are believed to be just for his work boots.  He also has not checked in with the sleep center.      CURRENT WEIGHT:   436 lbs 3.2 oz    Wt Readings from Last 4 Encounters:   10/22/19 (!) 436 lb 3.2 oz (197.9 kg)   09/10/19 (!) 450 lb 11.2 oz (204.4 kg)   19 (!) 455 lb 9.6 oz (206.7 kg)   19 (!) 463 lb 3.2 oz (210.1 kg)     BARIATRIC METRICS:    Current Weight: (!) 436 lb 3.2 oz (197.9 kg)  Body mass index is 53.8 kg/m .   Wt change since last visit (lbs): -14.5  Cumulative weight loss (lbs): 32.3    DIETARY HISTORY  Meals Per Day: 3  Food Diary:   B: Egg bake with lots of veggies. Turkey sausage. Water  L: :large salad with steak or chicken.  Assorted veggies. Light Caesar dressing:large salad with steak or chicken.  Assorted veggies. Light Caesar dressing.  Dressing is put directly on salad  D:pre-portioned container with meatloaf, mashed potatoes with broccoli.  Made by wife  When she cooks she dishes up the plate so controls the portions.  Most of plate veggies  Snacks Per Day: twice.  Not after dinner  Typical Snack: turkey sticks and greek yogurt at lunch  Fluid Intake: Has not significantly decreased Mt Dew.  Will have 2 - 2  "liter diet Mt Dew and 3-4   12 oz cans of Mt Dew daily. +  Gets in 40-60 oz water daily    Calorie Containing Beverages: diet Mt Dew has some. Not a lot   Typical Protein Food Choices:   Meals at Restaurant per week:couple     Struggling With: exercise.  Does not get any in.  Has feet pain.  Uses compression stockings. Has plantaar fasciitis.     Getting Adequate Protein: yes  Sleeping 7-8 hours/day: 6   Stress management: good      ROS:  General  Fatigue: some  Sleep Quality:  Insomnia:  HEENT  Dry Mouth: sometimes  Hx of glaucoma: No  Vision changes:  No  Cardiovascular  Chest Pain with Exertion: No  Palpitations: No  Hx of heart disease: No  HTN:  Pulmonary  Shortness of breath at rest: No  Shortness of breath with exertion: Yes  Gastrointestinal  Nausea: No  Diarrhea: No  Heartburn: No  Abdominal pain: No  Constipation: No  Psychiatric  Moods Stable: yes  Anxiety:  Depression:  Neurologic:  Hx of seizures:  No  Hx of paresthesias: No  Headaches:  Tremor:    History of kidney stones: No  History of kidney disease: No  Current birth control:     MEDICATIONS:   Current Outpatient Medications   Medication     fluticasone (FLONASE) 50 MCG/ACT nasal spray     furosemide (LASIX) 40 MG tablet     garlic 150 MG TABS tablet     glucosamine-chondroitin 500-400 MG CAPS per capsule     lisinopril (PRINIVIL/ZESTRIL) 40 MG tablet     loratadine (CLARITIN) 10 MG tablet     multivitamin w/minerals (MULTI-VITAMIN) tablet     omega 3 1000 MG CAPS     order for DME     silver sulfADIAZINE (SILVADENE) 1 % external cream     No current facility-administered medications for this visit.          PE:  /82   Pulse 66   Ht 6' 3.5\" (1.918 m)   Wt (!) 436 lb 3.2 oz (197.9 kg)   SpO2 100%   BMI 53.80 kg/m      Initial BP: 152/72  GENERAL Pt in NAD.  HEART: No JVD  LUNGS: Breathing unlabored.  MUSC: Gait normal  NEURO: Alert and oriented x3.       ASSESSMENT:   Morbid Obesity  Hypertension   Hyperlipidemia  Plantar " fasciitis  MARLY    PLAN: Patient was congratulated on his weight loss success thus far. Healthy habits to assist with further weight loss were discussed.   See primary regarding foot pain AND elevated blood pressure  MARLY - encouraged patient to check back with sleep center - currently sleeps in chair.  Has an old CPAP  Schedule with podiatrist by next visit.  They can decide if need another pair of orthotics to help with walking  Continue tracking meals  Dietary education      FOLLOW-UP:    1 month diet  2 month PA      Time: 22 min spent on evaluation, management, counseling, education, & motivational interviewing with greater than 50 % of the total time was spent on counseling and coordinating care    Sincerely,      Rabia Tena PA-C

## 2019-10-24 NOTE — PATIENT INSTRUCTIONS
PLAN:    See primary regarding foot pain AND elevated blood pressure  MARLY - encouraged patient to check back with sleep center - currently sleeps in chair.  Has an old CPAP  Schedule with podiatrist by next visit.  They can decide if need another pair of orthotics to help with walking  Continue tracking meals  Dietary education

## 2019-10-28 ENCOUNTER — MYC MEDICAL ADVICE (OUTPATIENT)
Dept: FAMILY MEDICINE | Facility: CLINIC | Age: 59
End: 2019-10-28

## 2019-10-30 DIAGNOSIS — I10 ESSENTIAL HYPERTENSION WITH GOAL BLOOD PRESSURE LESS THAN 140/90: ICD-10-CM

## 2019-10-30 DIAGNOSIS — R60.0 PERIPHERAL EDEMA: ICD-10-CM

## 2019-10-30 LAB
ANION GAP SERPL CALCULATED.3IONS-SCNC: 5 MMOL/L (ref 3–14)
BUN SERPL-MCNC: 22 MG/DL (ref 7–30)
CALCIUM SERPL-MCNC: 8.8 MG/DL (ref 8.5–10.1)
CHLORIDE SERPL-SCNC: 104 MMOL/L (ref 94–109)
CO2 SERPL-SCNC: 30 MMOL/L (ref 20–32)
CREAT SERPL-MCNC: 0.93 MG/DL (ref 0.66–1.25)
GFR SERPL CREATININE-BSD FRML MDRD: >90 ML/MIN/{1.73_M2}
GLUCOSE SERPL-MCNC: 98 MG/DL (ref 70–99)
POTASSIUM SERPL-SCNC: 3.9 MMOL/L (ref 3.4–5.3)
SODIUM SERPL-SCNC: 139 MMOL/L (ref 133–144)

## 2019-10-30 PROCEDURE — 36415 COLL VENOUS BLD VENIPUNCTURE: CPT | Performed by: PHYSICIAN ASSISTANT

## 2019-10-30 PROCEDURE — 80048 BASIC METABOLIC PNL TOTAL CA: CPT | Performed by: PHYSICIAN ASSISTANT

## 2019-11-05 ENCOUNTER — HEALTH MAINTENANCE LETTER (OUTPATIENT)
Age: 59
End: 2019-11-05

## 2019-11-12 ENCOUNTER — ALLIED HEALTH/NURSE VISIT (OUTPATIENT)
Dept: SURGERY | Facility: CLINIC | Age: 59
End: 2019-11-12
Payer: COMMERCIAL

## 2019-11-12 VITALS — WEIGHT: 315 LBS | HEIGHT: 76 IN | BODY MASS INDEX: 38.36 KG/M2

## 2019-11-12 DIAGNOSIS — E66.01 MORBID OBESITY (H): ICD-10-CM

## 2019-11-12 PROCEDURE — 97803 MED NUTRITION INDIV SUBSEQ: CPT | Performed by: DIETITIAN, REGISTERED

## 2019-11-12 ASSESSMENT — MIFFLIN-ST. JEOR: SCORE: 2892.16

## 2019-11-12 NOTE — PROGRESS NOTES
MEDICAL WEIGHT LOSS FOLLOW UP      DIAGNOSIS:  Class III Obesity    NUTRITION HISTORY:    Breakfast: 2 scrambled eggs with diced ham, fresh or frozen berries or cold Raisin Bran cereal/ skim milk on weekends     Lunch:  Lettuce salad+protein+ light dressing, plain Greek yogurt, berries + Stevia     Dinner:  Chicken breast or turkey, starch, broccoli or cauliflower      Snacks:  Meat/ cheese stick     Beverage Choices: ~60 oz water, 42-62 oz Diet Mountain Dew, occasionally milk, rare ETOH use     Exercise: none     Other: Pt. attended a 11/6/19 information session. He is still frightened about potentially having weight loss surgery. Patient states his wife is totally on board with pt having weight loss surgery, but he is not yet. Pt is resistant to do chair exercises (not confident his chairs are supportive enough) or visit the [facility] (accross from work)    ANTHROPOMETRICS:    Initial Weight: 463.2 pounds    Previous Weight:  450.7 pounds    Current Weight:  435.1 pounds    Weight Change:  decrease 15.6 pounds    BMI: 53.67 kg/m2    MEDICATION FOR WEIGHT LOSS:  None    EVALUATION/PROGRESS TOWARDS GOALS:  Previous Goals:  See MD for foot pain (limits activity)-not met  Continue to track intake in not book-met  Consider attending a surgical weight loss information meeting (written information provided)-met     Previous Nutrition Diagnosis:  Obese class III related to excess energy intake as evidence by BMI of 55.59 kg/m2     Current Nutrition Diagnosis:   Obese class III related to excess energy intake as evidence by BMI of 53.67 kg/m2  No change      INTERVENTION:    Nutrition Prescription:  Recommend modified nutrient intake by decreasing energy intake    Implementation:    Meals and Snacks: 3/1    Nutrition Education (Content):    Discussed previous goals and determined new goals    Encourage physical activity    Supported patient in attempted weight loss and behavior changes     Congratulated patient on successful  weight loss     Patient verbalizes understanding of diet by stating the value of tracking his intake    Anticipate good compliance    Goals:  Explore tracking intake on lane (offered suggestions) and aim for 3033-5383 kcal/day  Explore bariatric surgery further (ASMBS web site provided)  Schedule visit with a provider for foot pain      Follow Up/Monitoring:  Other  -  patient to follow up in 8 weeks    Time Spent With Patient:  35 Minutes    Chidi Feliz RD, LD  [facility]  [phone]

## 2019-12-13 ENCOUNTER — OFFICE VISIT (OUTPATIENT)
Dept: SURGERY | Facility: CLINIC | Age: 59
End: 2019-12-13
Payer: COMMERCIAL

## 2019-12-13 VITALS
HEIGHT: 76 IN | SYSTOLIC BLOOD PRESSURE: 136 MMHG | BODY MASS INDEX: 38.36 KG/M2 | DIASTOLIC BLOOD PRESSURE: 68 MMHG | WEIGHT: 315 LBS | OXYGEN SATURATION: 99 % | HEART RATE: 74 BPM

## 2019-12-13 DIAGNOSIS — E78.5 HYPERLIPIDEMIA LDL GOAL <160: ICD-10-CM

## 2019-12-13 DIAGNOSIS — I10 ESSENTIAL HYPERTENSION WITH GOAL BLOOD PRESSURE LESS THAN 140/90: ICD-10-CM

## 2019-12-13 DIAGNOSIS — E66.01 MORBID OBESITY WITH BMI OF 50.0-59.9, ADULT (H): Primary | ICD-10-CM

## 2019-12-13 DIAGNOSIS — I89.0 LYMPHEDEMA: ICD-10-CM

## 2019-12-13 PROCEDURE — 99213 OFFICE O/P EST LOW 20 MIN: CPT | Performed by: PHYSICIAN ASSISTANT

## 2019-12-13 ASSESSMENT — MIFFLIN-ST. JEOR: SCORE: 2871.3

## 2019-12-13 NOTE — PROGRESS NOTES
2019      Return Medical Weight Management Note     Da Mustafa  MRN:  [id number]  :  1960      Dear Robel Morris,    I had the pleasure of seeing your patient Da Mustafa.  He is a 59 year old male who I am continuing to see for treatment of obesity related to:       2019   I have the following co-morbidities associated with obesity: High Blood Pressure, Sleep Apnea       INTERVAL HISTORY:  He is scheduled to see podiatrist soon to discuss footwear.  Hopefully this will help with increasing activity level. Lymphedema is under better control.  Has not checked in with the sleep center.  Not sure he wants to.  Here today with his wife. Overall he has maintained his lifestyle changes this past month.      CURRENT WEIGHT:   430 lbs 8 oz    Wt Readings from Last 4 Encounters:   19 (!) 430 lb 8 oz (195.3 kg)   19 (!) 435 lb 1.6 oz (197.4 kg)   10/22/19 (!) 436 lb 3.2 oz (197.9 kg)   09/10/19 (!) 450 lb 11.2 oz (204.4 kg)       BARIATRIC METRICS:  Current Weight: (!) 430 lb 8 oz (195.3 kg)  Body mass index is 53.1 kg/m .   Wt change since last visit (lbs): -4.6  Cumulative weight loss (lbs): 38    DIETARY HISTORY  Meals Per Day: 3  Food Diary:   B: 3 Scrambled eggs with turkey sausage with berries.  Water.  Sporadically drinks diet Mt Dew in the AM  L: large salad with steak or chicken.  Assorted veggies. Light Caesar dressing:large salad with steak or chicken.  Assorted veggies. Light Caesar dressing.  Dressing is put directly on salad  D: Last night had culvers.  Night before veggie lasagna and green beans.  Turkey noodle soup.    Snacks Per Day: 1-2  Typical Snack: [place] peanuts reaching into container, carrots and celery, 2 turkey sticks   Fluid Intake:  Has not significantly decreased Mt Dew.  Will have 1 liter diet Mt Dew and 3-4   12 oz cans of Mt Dew daily. +  Gets in 40-60 oz water daily  Calorie Containing Beverages: see above  Typical Protein Food  "Choices: meats and dairy  Meals at Restaurant per week: 2  Eating at the Table: No  TV/screen time During Meals: Yes    Positive Changes Since Last Visit:   Struggling With:  Getting more activity in     Getting Adequate Protein: yes  Stress management  ok      Exercise:  Has not really changed.  Has done some snow shoveling.        ROS:  General  Fatigue:  No  Sleep Quality: not sleeping that well this week.  A lot ot thing going on  HEENT  Hx of glaucoma: No  Vision changes:  No  Cardiovascular  Chest Pain with Exertion: No  Palpitations:  No  Pulmonary  Shortness of breath at rest: No  Shortness of breath with exertion:   Gastrointestinal  Nausea: No  Diarrhea: No  Heartburn: No  Psychiatric  Moods Stable: yes      MEDICATIONS:   Current Outpatient Medications   Medication     fluticasone (FLONASE) 50 MCG/ACT nasal spray     furosemide (LASIX) 40 MG tablet     garlic 150 MG TABS tablet     glucosamine-chondroitin 500-400 MG CAPS per capsule     lisinopril (PRINIVIL/ZESTRIL) 40 MG tablet     loratadine (CLARITIN) 10 MG tablet     multivitamin w/minerals (MULTI-VITAMIN) tablet     omega 3 1000 MG CAPS     order for DME     silver sulfADIAZINE (SILVADENE) 1 % external cream     No current facility-administered medications for this visit.          PE:  /68   Pulse 74   Ht 6' 3.5\" (1.918 m)   Wt (!) 430 lb 8 oz (195.3 kg)   SpO2 99%   BMI 53.10 kg/m    GENERAL Pt in NAD.  HEART: No JVD  LUNGS: Breathing unlabored.  MUSC: Gait normal  NEURO: Alert and oriented x3.       ASSESSMENT:   Morbid Obesity  MARLY  Hyperlipidemia  Hypertension      PLAN: Patient was congratulated on his weight loss success thus far. Healthy habits to assist with further weight loss were discussed. DEONTE will continue the lifestyle changes he has started.     Once footwear gets taken care of the goal is to walk at [facility] twice weekly.    Get better with drinking enough water      FOLLOW-UP:    1 month diet  2 month PA      Time: 20 min " spent on evaluation, management, counseling, education, & motivational interviewing with greater than 50 % of the total time was spent on counseling and coordinating care    Sincerely,      Rabia Tena PA-C

## 2019-12-17 ENCOUNTER — OFFICE VISIT (OUTPATIENT)
Dept: PODIATRY | Facility: CLINIC | Age: 59
End: 2019-12-17
Payer: COMMERCIAL

## 2019-12-17 VITALS
BODY MASS INDEX: 38.36 KG/M2 | DIASTOLIC BLOOD PRESSURE: 82 MMHG | WEIGHT: 315 LBS | HEIGHT: 76 IN | SYSTOLIC BLOOD PRESSURE: 132 MMHG

## 2019-12-17 DIAGNOSIS — E66.01 MORBID OBESITY (H): ICD-10-CM

## 2019-12-17 DIAGNOSIS — M21.42 PES PLANUS OF BOTH FEET: ICD-10-CM

## 2019-12-17 DIAGNOSIS — M21.41 PES PLANUS OF BOTH FEET: ICD-10-CM

## 2019-12-17 DIAGNOSIS — M72.2 PLANTAR FASCIITIS, BILATERAL: ICD-10-CM

## 2019-12-17 DIAGNOSIS — M79.672 FOOT PAIN, BILATERAL: Primary | ICD-10-CM

## 2019-12-17 DIAGNOSIS — I89.0 LYMPHEDEMA: ICD-10-CM

## 2019-12-17 DIAGNOSIS — M79.671 FOOT PAIN, BILATERAL: Primary | ICD-10-CM

## 2019-12-17 PROCEDURE — 20550 NJX 1 TENDON SHEATH/LIGAMENT: CPT | Mod: 50 | Performed by: PODIATRIST

## 2019-12-17 PROCEDURE — 99203 OFFICE O/P NEW LOW 30 MIN: CPT | Mod: 25 | Performed by: PODIATRIST

## 2019-12-17 ASSESSMENT — MIFFLIN-ST. JEOR: SCORE: 2869.03

## 2019-12-17 NOTE — LETTER
12/17/2019         RE: Da Mustafa  [address] [facility] [id number]        Dear Colleague,    Thank you for referring your patient, Da Mustafa, to the [facility]. Please see a copy of my visit note below.    PATIENT HISTORY:   Da Mustafa is a 59 year old male who presents to clinic for bilateral foot pain. Notes it has been going on for a few months. Has been trying to work out and loose weight. Left one is very sore. Right one not as bad. Very sore when he gets up in the morning. 8/10. Denies injury. Will be sharp and throbbing. Wondering what is causing pain and what can be done for them. Has tried new shoes which help some. Does not go barefoot.     Review of Systems:  Patient denies fever, chills, rash, wound, stiffness, numbness, weakness, heart burn, blood in stool, chest pain with activity, calf pain when walking, shortness of breath with activity, chronic cough, easy bleeding/bruising,  excessive thirst, fatigue, depression, anxiety.  Patient admits to limping and swelling. .     PAST MEDICAL HISTORY:   Past Medical History:   Diagnosis Date     Class 3 obesity due to excess calories without serious comorbidity with body mass index (BMI) of 50.0 to 59.9 in adult 12/27/2017     Edema      Essential hypertension with goal blood pressure less than 140/90 7/25/2016     Essential hypertension, benign      Hyperlipidemia LDL goal <160 7/28/2015     Hypertension goal BP (blood pressure) < 140/90 4/25/2013     Obesity, unspecified      Seasonal allergic rhinitis 5/19/2014     Sleep apnea         PAST SURGICAL HISTORY:   Past Surgical History:   Procedure Laterality Date     C NONSPECIFIC PROCEDURE       COLONOSCOPY  12/4/2013    Procedure: COLONOSCOPY;  Colonoscopy  ;  Surgeon: Beny Rich MD;  Location:  GI        MEDICATIONS:   Current Outpatient Medications:      fluticasone (FLONASE) 50 MCG/ACT nasal spray, SPRAY 2 SPRAYS INTO BOTH NOSTRILS DAILY, Disp:  16 mL, Rfl: 2     furosemide (LASIX) 40 MG tablet, TAKE 1 TABLET BY MOUTH EVERY DAY, Disp: 90 tablet, Rfl: 0     garlic 150 MG TABS tablet, Take 150 mg by mouth daily, Disp: , Rfl:      glucosamine-chondroitin 500-400 MG CAPS per capsule, Take 1 capsule by mouth daily, Disp: , Rfl:      lisinopril (PRINIVIL/ZESTRIL) 40 MG tablet, Take 1 tablet (40 mg) by mouth daily, Disp: 90 tablet, Rfl: 3     loratadine (CLARITIN) 10 MG tablet, Take 10 mg by mouth daily, Disp: , Rfl:      multivitamin w/minerals (MULTI-VITAMIN) tablet, Take 1 tablet by mouth daily, Disp: , Rfl:      omega 3 1000 MG CAPS, , Disp: , Rfl:      order for DME, BLE knee high custom compression stockings 20-30 or 30-40 mm Hg compression stockings, Disp: 1 each, Rfl: 0     silver sulfADIAZINE (SILVADENE) 1 % external cream, Apply topically 2 times daily, Disp: 85 g, Rfl: 1     ALLERGIES:    Allergies   Allergen Reactions     No Known Drug Allergies         SOCIAL HISTORY:   Social History     Socioeconomic History     Marital status:      Spouse name: Not on file     Number of children: Not on file     Years of education: Not on file     Highest education level: Not on file   Occupational History     Not on file   Social Needs     Financial resource strain: Not on file     Food insecurity:     Worry: Not on file     Inability: Not on file     Transportation needs:     Medical: Not on file     Non-medical: Not on file   Tobacco Use     Smoking status: Never Smoker     Smokeless tobacco: Never Used   Substance and Sexual Activity     Alcohol use: Yes     Comment: rare     Drug use: No     Sexual activity: Yes     Partners: Female   Lifestyle     Physical activity:     Days per week: Not on file     Minutes per session: Not on file     Stress: Not on file   Relationships     Social connections:     Talks on phone: Not on file     Gets together: Not on file     Attends Islam service: Not on file     Active member of club or organization: Not on  "file     Attends meetings of clubs or organizations: Not on file     Relationship status: Not on file     Intimate partner violence:     Fear of current or ex partner: Not on file     Emotionally abused: Not on file     Physically abused: Not on file     Forced sexual activity: Not on file   Other Topics Concern     Parent/sibling w/ CABG, MI or angioplasty before 65F 55M? No   Social History Narrative     Not on file        FAMILY HISTORY:   Family History   Problem Relation Age of Onset     Cancer Father         EXAM:Vitals: /82   Ht 1.918 m (6' 3.5\")   Wt (!) 195 kg (430 lb)   BMI 53.04 kg/m     BMI= Body mass index is 53.04 kg/m .    General appearance: Patient is alert and fully cooperative with history & exam.  No sign of distress is noted during the visit.     Psychiatric: Affect is pleasant & appropriate.  Patient appears motivated to improve health.     Respiratory: Breathing is regular & unlabored while sitting.     HEENT: Hearing is intact to spoken word.  Speech is clear.  No gross evidence of visual impairment that would impact ambulation.     Dermatologic: Skin is intact to both lower extremities without significant lesions, rash or abrasion.  No paronychia or evidence of soft tissue infection is noted.     Vascular: DP & PT pulses are intact & regular bilaterally. significant edema but no varicosities noted.  CFT and skin temperature is normal to both lower extremities.     Neurologic: Lower extremity sensation is intact to light touch.  No evidence of weakness or contracture in the lower extremities.  No evidence of neuropathy.     Musculoskeletal: Patient is ambulatory without assistive device or brace.  Decrease arch height. Pain on palpation of plantar heels, left more than right.        ASSESSMENT:    Foot pain, bilateral  Pes planus of both feet  Plantar fasciitis, bilateral  Morbid obesity (H)  Lymphedema       PLAN:  Reviewed patient's chart in [facility]. The potential causes and nature of " plantar fasciitis were discussed with the patient.  We reviewed the natural history/prognosis of the condition and risks if left untreated.  These include chronic pain, other sites of pain due to gait changes, and potential plantar fascial rupture.      We discussed possible causes of the condition as it relates to the patients specific situation.      Conservative treatment options were reviewed:  appropriate shoes, avoidance of barefoot walking, inserts/orthoses, stretching, ice, massage, immobilization and NSAIDs.     We also reviewed the options of injection therapy and surgery.  However, it was made clear that surgery is only considered when conservative therapy fails.  The risks and benefits of injection therapy, and surgery were discussed.     After thorough discussion and answering all questions, the patient elected to try injections, stretching, and physical therapy. Continue wearing shoes in house. If pain continues, recommend MRI of ankle.     Procedure: After verbal consent, the patients max point of tenderness was marked out on the right plantar heel.  This area was prepped and draped using sterile technique.  An injection of 1cc of 1% lidocaine plain and 1 1/2 cc of Kenalog-40 was injected into the max point of tenderness.  This was distributed in a fanning motion.  Patient tolerated the procedure and anesthesia well.    Procedure 2: same procedure to left heel.     Stephanie Patricia DPM, Podiatry/Foot and Ankle Surgery    Weight management plan: Patient was referred to their PCP to discuss a diet and exercise plan.    Recommended to Da Mustafa to follow up with Primary Care provider regarding elevated blood pressure.        Again, thank you for allowing me to participate in the care of your patient.        Sincerely,        Stephanie Patricia DPM, Podiatry/Foot and Ankle Surgery

## 2019-12-17 NOTE — PROGRESS NOTES
PATIENT HISTORY:   Da Mustafa is a 59 year old male who presents to clinic for bilateral foot pain. Notes it has been going on for a few months. Has been trying to work out and loose weight. Left one is very sore. Right one not as bad. Very sore when he gets up in the morning. 8/10. Denies injury. Will be sharp and throbbing. Wondering what is causing pain and what can be done for them. Has tried new shoes which help some. Does not go barefoot.     Review of Systems:  Patient denies fever, chills, rash, wound, stiffness, numbness, weakness, heart burn, blood in stool, chest pain with activity, calf pain when walking, shortness of breath with activity, chronic cough, easy bleeding/bruising,  excessive thirst, fatigue, depression, anxiety.  Patient admits to limping and swelling. .     PAST MEDICAL HISTORY:   Past Medical History:   Diagnosis Date     Class 3 obesity due to excess calories without serious comorbidity with body mass index (BMI) of 50.0 to 59.9 in adult 12/27/2017     Edema      Essential hypertension with goal blood pressure less than 140/90 7/25/2016     Essential hypertension, benign      Hyperlipidemia LDL goal <160 7/28/2015     Hypertension goal BP (blood pressure) < 140/90 4/25/2013     Obesity, unspecified      Seasonal allergic rhinitis 5/19/2014     Sleep apnea         PAST SURGICAL HISTORY:   Past Surgical History:   Procedure Laterality Date     C NONSPECIFIC PROCEDURE       COLONOSCOPY  12/4/2013    Procedure: COLONOSCOPY;  Colonoscopy  ;  Surgeon: Beny Rich MD;  Location:  GI        MEDICATIONS:   Current Outpatient Medications:      fluticasone (FLONASE) 50 MCG/ACT nasal spray, SPRAY 2 SPRAYS INTO BOTH NOSTRILS DAILY, Disp: 16 mL, Rfl: 2     furosemide (LASIX) 40 MG tablet, TAKE 1 TABLET BY MOUTH EVERY DAY, Disp: 90 tablet, Rfl: 0     garlic 150 MG TABS tablet, Take 150 mg by mouth daily, Disp: , Rfl:      glucosamine-chondroitin 500-400 MG CAPS per capsule, Take 1  capsule by mouth daily, Disp: , Rfl:      lisinopril (PRINIVIL/ZESTRIL) 40 MG tablet, Take 1 tablet (40 mg) by mouth daily, Disp: 90 tablet, Rfl: 3     loratadine (CLARITIN) 10 MG tablet, Take 10 mg by mouth daily, Disp: , Rfl:      multivitamin w/minerals (MULTI-VITAMIN) tablet, Take 1 tablet by mouth daily, Disp: , Rfl:      omega 3 1000 MG CAPS, , Disp: , Rfl:      order for DME, BLE knee high custom compression stockings 20-30 or 30-40 mm Hg compression stockings, Disp: 1 each, Rfl: 0     silver sulfADIAZINE (SILVADENE) 1 % external cream, Apply topically 2 times daily, Disp: 85 g, Rfl: 1     ALLERGIES:    Allergies   Allergen Reactions     No Known Drug Allergies         SOCIAL HISTORY:   Social History     Socioeconomic History     Marital status:      Spouse name: Not on file     Number of children: Not on file     Years of education: Not on file     Highest education level: Not on file   Occupational History     Not on file   Social Needs     Financial resource strain: Not on file     Food insecurity:     Worry: Not on file     Inability: Not on file     Transportation needs:     Medical: Not on file     Non-medical: Not on file   Tobacco Use     Smoking status: Never Smoker     Smokeless tobacco: Never Used   Substance and Sexual Activity     Alcohol use: Yes     Comment: rare     Drug use: No     Sexual activity: Yes     Partners: Female   Lifestyle     Physical activity:     Days per week: Not on file     Minutes per session: Not on file     Stress: Not on file   Relationships     Social connections:     Talks on phone: Not on file     Gets together: Not on file     Attends Pentecostalism service: Not on file     Active member of club or organization: Not on file     Attends meetings of clubs or organizations: Not on file     Relationship status: Not on file     Intimate partner violence:     Fear of current or ex partner: Not on file     Emotionally abused: Not on file     Physically abused: Not on  "file     Forced sexual activity: Not on file   Other Topics Concern     Parent/sibling w/ CABG, MI or angioplasty before 65F 55M? No   Social History Narrative     Not on file        FAMILY HISTORY:   Family History   Problem Relation Age of Onset     Cancer Father         EXAM:Vitals: /82   Ht 1.918 m (6' 3.5\")   Wt (!) 195 kg (430 lb)   BMI 53.04 kg/m    BMI= Body mass index is 53.04 kg/m .    General appearance: Patient is alert and fully cooperative with history & exam.  No sign of distress is noted during the visit.     Psychiatric: Affect is pleasant & appropriate.  Patient appears motivated to improve health.     Respiratory: Breathing is regular & unlabored while sitting.     HEENT: Hearing is intact to spoken word.  Speech is clear.  No gross evidence of visual impairment that would impact ambulation.     Dermatologic: Skin is intact to both lower extremities without significant lesions, rash or abrasion.  No paronychia or evidence of soft tissue infection is noted.     Vascular: DP & PT pulses are intact & regular bilaterally. significant edema but no varicosities noted.  CFT and skin temperature is normal to both lower extremities.     Neurologic: Lower extremity sensation is intact to light touch.  No evidence of weakness or contracture in the lower extremities.  No evidence of neuropathy.     Musculoskeletal: Patient is ambulatory without assistive device or brace.  Decrease arch height. Pain on palpation of plantar heels, left more than right.        ASSESSMENT:    Foot pain, bilateral  Pes planus of both feet  Plantar fasciitis, bilateral  Morbid obesity (H)  Lymphedema       PLAN:  Reviewed [facility] chart in [facility]. The potential causes and nature of plantar fasciitis were discussed with the patient.  We reviewed the natural history/prognosis of the condition and risks if left untreated.  These include chronic pain, other sites of pain due to gait changes, and potential plantar fascial " rupture.      We discussed possible causes of the condition as it relates to the patients specific situation.      Conservative treatment options were reviewed:  appropriate shoes, avoidance of barefoot walking, inserts/orthoses, stretching, ice, massage, immobilization and NSAIDs.     We also reviewed the options of injection therapy and surgery.  However, it was made clear that surgery is only considered when conservative therapy fails.  The risks and benefits of injection therapy, and surgery were discussed.     After thorough discussion and answering all questions, the patient elected to try injections, stretching, and physical therapy. Continue wearing shoes in house. If pain continues, recommend MRI of ankle.     Procedure: After verbal consent, the patients max point of tenderness was marked out on the right plantar heel.  This area was prepped and draped using sterile technique.  An injection of 1cc of 1% lidocaine plain and 1 1/2 cc of Kenalog-40 was injected into the max point of tenderness.  This was distributed in a fanning motion.  Patient tolerated the procedure and anesthesia well.    Procedure 2: same procedure to left heel.     Stephanie Patricia DPM, Podiatry/Foot and Ankle Surgery    Weight management plan: Patient was referred to their PCP to discuss a diet and exercise plan.    Recommended to Da Mustafa to follow up with Primary Care provider regarding elevated blood pressure.

## 2019-12-17 NOTE — PATIENT INSTRUCTIONS
Call in 2 weeks for MRI if pain not improved.       Thank you for choosing [facility]!    DR. STAUFFER'S CLINIC SCHEDULE  MONDAY AM - [place] TUESDAY - [place]   [address] [address] [place] [address] [place] [address]   [phone] / [phone] / [phone]       WEDNESDAY - [place] FRIDAY AM - W[facility]   [address]   [place] [address] [place] [address]   [phone] / [phone]       FRIDAY PM - [place] SCHEDULE SURGERY: [phone]   [address] BILLING QUESTIONS: [phone]   [place] [address] AFTER HOURS: [phone] / [phone] APPOINTMENTS: [phone]     Consumer Price Line (CPL) [phone]       PHYSICAL THERAPY REFERRAL  [facility] ([facility])  [phone]          PLANTAR FASCIITIS  Plantar fasciitis is often referred to as heel spurs or heel pain. Plantar fasciitis is a very common problem that affects people of all foot shapes, age, weight and activity level. Pain may be in the arch or on the weight-bearing surface of the heel. The pain may come on without injury or identifiable cause. Pain is generally present when first getting out of bed in the morning or up from a seated break.     CAUSES  The plantar fascia is a dense fibrous band of tissue that stretches across the bottom surface of the foot. The fascia helps support the foot muscles and arch. Plantar fasciitis is thought to be caused by mechanical strain or overload. Frequent walking without shoes or wearing unsupportive shoes is thought to cause structural overload and ultimately inflammation of the plantar fascia. Some people have heel spurs that can be seen on x-ray. The heel spur is actually a minor component of plantar fascitis and is largely ignored.       SELF TREATMENT   The easiest solution is to stop walking around your home  without shoes. Plantar fasciitis is largely a shoe problem. Shoes are either not being worn often enough or your current shoes are inadequate for your weight, foot structure or activity level. The majority of shoes on the market today are not sufficient to resist development of plantar fasciitis or to promote healing. Assume that your current shoes are inadequate and will need to be replaced. Even high quality shoes wear out with 6 months to one year of frequent use. Weight loss is another option. Losing ten pounds in the next two months may be enough to resolve the problem. Ice applied to the area of pain two to three times per day for ten minutes each session can be very helpful. Warm foot soaks in epsom salts can also relieve pain. This should continue until the problem resolves. Achilles tendon stretching is essential. Stretch multiple times daily to promote healing and to prevent recurrence in the future. Over all stretching of the body is helpful as well such as the calves, thighs and lower back. Normally when one area of the body is tight, other areas are too. Gentle Yoga can be good for this.     Over the counter topical anti inflammatories can be helpful such as biofreeze, bengay, salon pas, ect...  Oral ibuprofen or aleve is recommended as well to try to calm down inflammation.     Night splints can be helpful to gradually stretch the foot at night as a lot of pain is when you get up in the morning. Taking a towel or thera band and stretching the foot back multiple times before you get ou of bed can be beneficial as well.     MEDICAL TREATMENT  Medical treatments often include custom arch supports, cortisone injections, physical therapy, splints to be worn in bed, prescription medications and surgery. The home treatments listed above will be necessary regardless of these advanced medical treatments. Surgery is rarely needed but is very helpful in selected cases.     PROGNOSIS  Plantar fasciitis can last  from one day to a lifetime. Some people get intermittent fascitis that is very short-lived. Others suffer daily for years. Excessive body weight, frequent bare foot walking, long hours on the feet, inadequate shoes, predisposing foot structures and excessive activity such as running are all potential issues that lead to chronic and/or recurring plantar fascitis. Having plantar fasciitis means that you are forever prone to this problem and will require modification of some of the above factors. Most people seek treatment within one to four months. Healing usually requires a similar one to four month time frame. Healing time is relative to the amount of effort spent treating the problem.   Plantar fasciitis is highly recurrent. Risk factors often continue, including return to bare foot walking, inadequate shoes, excessive body weight, excessive activities, etc. Your life style and foot structure may predispose you to recurrent plantar fasciitis. A daily prevention regimen can be very helpful. Ongoing use of shoe inserts, careful attention to appropriate shoes, daily Achilles stretching, etc. may prevent recurrence. Prompt attention at the earliest warning signs of heel pain can resolve the problem in as short as a few days.     EXERCISES  Stair Exercise: Step on the stairs with the ball of your foot and hold your position for at least 15 seconds, then slowly step down with the heels of your foot. You can do this daily and as often as you want.   Picking the Towel: Sit comfortably and then pick the towel up with your toes. You can use any object other than a towel as long as the material can be soft and you can pick it up with your toes.  Rolling the Bottle: Use a small ball or frozen water bottle and then roll it around with your foot.   Flex the Toes: Sit comfortably and then flex your toes by pointing it towards the floor or towards your body. This will relax and flex your foot and exercise your plantar fascia, the  calf, and the Achilles tendon. The inability of the foot to stretch often causes the bunching up of the plantar fascia area leading to the pain.  Calf/Achilles Stretching: Lay on you back and raise one foot, then point your toes towards the floor. See photo below:               Hold each stretch for 10 seconds. Stretch 10 times per set, three sets per day. Morning, afternoon and evening. If your heel pain is very severe in the morning, consider doing the first set of stretches before you get out of bed.      OVER THE COUNTER INSERT RECOMMENDATIONS  SuperFeet   Sofsole Fit Spenco   Power Step   Walk-Fit Arch Cradles     Most of these can be found at your local [employer], sporting [employer], or online.  **A good high quality over the counter insert should cost around $40-$50      [facility]  [address] [place] [address]  [phone]   [address]  [phone] [place]  [address] [facility]  [phone]   [place]  [address] [facility] [address]  [phone]   [place]  [address] [facility]  [phone] [place] [address]  [phone]   [place]  [address]  [phone]   [place]  [address]  [phone] [place]  [address]  [phone]             BODY WEIGHT AND YOUR FEET  The following information is included in the after visit summary for all patients. Body weight can be a sensitive issue to discuss in clinic, but we think the following information is very important. Although we focus on the feet and ankles, we do support the overall health of our patients.     Many things can cause foot and ankle problems. Foot structure, activity level, foot mechanics and injuries are common causes of pain. One very important issue that often goes unmentioned, is body weight. Extra weight can cause increased stress on muscles, ligaments, bones and tendons. Sometimes just a few extra pounds is all it takes to put one over her/his  threshold. Without reducing that stress, it can be difficult to alleviate pain. As Foot & Ankle specialists, our job is addressing the lower extremity problem and possible causes. Regarding extra body weight, we encourage patients to discuss diet and weight management plans with their primary care doctors. It is this team approach that gives you the best opportunity for pain relief and getting you back on your feet.      [facility] has a Comprehensive Weight Management Program. This program includes counseling, education, non-surgical and surgical approaches to weight loss. If you are interested in learning more either talk to you primary care provider or call [phone].

## 2019-12-20 ENCOUNTER — THERAPY VISIT (OUTPATIENT)
Dept: PHYSICAL THERAPY | Facility: CLINIC | Age: 59
End: 2019-12-20
Payer: COMMERCIAL

## 2019-12-20 DIAGNOSIS — I89.0 LYMPHEDEMA: ICD-10-CM

## 2019-12-20 DIAGNOSIS — M79.672 FOOT PAIN, BILATERAL: ICD-10-CM

## 2019-12-20 DIAGNOSIS — M72.2 PLANTAR FASCIITIS OF RIGHT FOOT: ICD-10-CM

## 2019-12-20 DIAGNOSIS — M72.2 PLANTAR FASCIITIS, BILATERAL: ICD-10-CM

## 2019-12-20 DIAGNOSIS — M79.671 FOOT PAIN, BILATERAL: ICD-10-CM

## 2019-12-20 DIAGNOSIS — E66.01 MORBID OBESITY (H): ICD-10-CM

## 2019-12-20 DIAGNOSIS — M21.41 PES PLANUS OF BOTH FEET: ICD-10-CM

## 2019-12-20 DIAGNOSIS — M72.2 PLANTAR FASCIITIS OF LEFT FOOT: ICD-10-CM

## 2019-12-20 DIAGNOSIS — M21.42 PES PLANUS OF BOTH FEET: ICD-10-CM

## 2019-12-20 PROCEDURE — 97110 THERAPEUTIC EXERCISES: CPT | Mod: GP | Performed by: PHYSICAL THERAPIST

## 2019-12-20 PROCEDURE — 97112 NEUROMUSCULAR REEDUCATION: CPT | Mod: GP | Performed by: PHYSICAL THERAPIST

## 2019-12-20 PROCEDURE — 97161 PT EVAL LOW COMPLEX 20 MIN: CPT | Mod: GP | Performed by: PHYSICAL THERAPIST

## 2019-12-20 NOTE — PROGRESS NOTES
[facility] Initial Evaluation  Subjective:  The history is provided by the patient. No  was used.   Da Mustafa being seen for PT for Plantar Fasciitis (L>R).   Date of Onset: MD kelly 12/17/2019. Where condition occurred: other.Problem occurred: Has been gradually getting worse for a while.   and reported as 3/10 on pain scale. General health as reported by patient is fair. Pertinent medical history includes:  High blood pressure, overweight and sleep disorder/apnea.    Surgeries include:  None.  Current medications:  High blood pressure medication.   Primary job tasks include:  Computer work.  Pain is described as aching and is intermittent. Pain is worse in the A.M.. Since onset symptoms are gradually worsening.      Patient is . Restrictions include:  Working in normal job without restrictions.    Barriers include:  None as reported by patient.  Red flags:  None as reported by patient.  Type of problem:  Left foot and right foot   Condition occurred with:  Insidious onset. This is a chronic condition   Problem details: Patient is reporting chronic L>R plantar fascia pain. States this pain was initially worse first thing in the morning, but is now painful throughout the day. Pain increases while walking, especially when walking on uneven ground. B steroid injections on 12/17/2019 have reduced but not eliminated the pain.  .   Patient reports pain:  Medial calcaneal tuberosity. Radiates to:  No radiation. Associated symptoms:  Loss of motion/stiffness and loss of strength. Symptoms are exacerbated by weight bearing, walking, ascending stairs, descending stairs, standing and transfers (walking on uneven ground) and relieved by other (stretching).                      Objective:  Standing Alignment:                Ankle/Foot:  Pes planus L and pes planus R    Gait:    Gait Type:  Antalgic   Assistive Devices:  None  Deviations:  Ankle:  Heel strike decr  L and push off decr LGeneral Deviations:  Tanvi decr and stance time decr    Flexibility/Screens:       Lower Extremity:  Decreased left lower extremity flexibility:Gastroc and Soleus    Decreased right lower extremity flexibility:  Gastroc and Soleus          Ankle/Foot Evaluation  ROM:    AROM:    Dorsiflexion:  Left:   2  Right:   4  Plantarflexion:  Left:  20    Right:  52            Strength:    Dorsiflexion:  Left: 5/5     Pain:   Right: 5/5   Pain:  Plantarflexion: Left: 4/5   Pain:   Right: 5/5  Pain:  Inversion:Left: 3+/5  Pain:     Right: 5/5  Pain:  Eversion:Left: 5-/5  Pain:  Right: 5/5  Pain:                      PALPATION:   Left ankle tenderness present at:  plantar fascia and medial calcaneal  Right ankle tenderness present at:   plantar fascia and medial calcaneal      FUNCTIONAL TESTS:           Proprioception:  Stork Balance Test: Left: unable  Right: unable                                                     General     ROS    Assessment/Plan:    Patient is a 59 year old male with B feet complaints.    Patient has the following significant findings with corresponding treatment plan.                Diagnosis 1:  B (L>R) plantar fascitis  Pain -  hot/cold therapy, US, manual therapy, education and home program  Decreased ROM/flexibility - manual therapy and therapeutic exercise  Decreased strength - therapeutic exercise and therapeutic activities  Impaired balance - neuro re-education and therapeutic activities  Decreased proprioception - neuro re-education and therapeutic activities  Impaired gait - gait training  Impaired muscle performance - neuro re-education  Decreased function - therapeutic activities    Therapy Evaluation Codes:   1) History comprised of:   Personal factors that impact the plan of care:      Time since onset of symptoms.    Comorbidity factors that impact the plan of care are:      High blood pressure, Overweight and Sleep disorder/apnea.     Medications impacting care:  High blood pressure.  2) Examination of Body Systems comprised of:   Body structures and functions that impact the plan of care:      feet.   Activity limitations that impact the plan of care are:      Stairs, Standing and Walking.  3) Clinical presentation characteristics are:   Stable/Uncomplicated.  4) Decision-Making    Low complexity using standardized patient assessment instrument and/or measureable assessment of functional outcome.  Cumulative Therapy Evaluation is: Low complexity.    Previous and current functional limitations:  (See Goal Flow Sheet for this information)    Short term and Long term goals: (See Goal Flow Sheet for this information)     Communication ability:  Patient appears to be able to clearly communicate and understand verbal and written communication and follow directions correctly.  Treatment Explanation - The following has been discussed with the patient:   RX ordered/plan of care  Anticipated outcomes  Possible risks and side effects  This patient would benefit from PT intervention to resume normal activities.   Rehab potential is good.    Frequency:  1 X week, once daily  Duration:  for 8 weeks  Discharge Plan:  Achieve all LTG.  Independent in home treatment program.  Reach maximal therapeutic benefit.    Please refer to the daily flowsheet for treatment today, total treatment time and time spent performing 1:1 timed codes.

## 2020-01-02 ENCOUNTER — THERAPY VISIT (OUTPATIENT)
Dept: PHYSICAL THERAPY | Facility: CLINIC | Age: 60
End: 2020-01-02
Payer: COMMERCIAL

## 2020-01-02 DIAGNOSIS — M72.2 PLANTAR FASCIITIS OF LEFT FOOT: ICD-10-CM

## 2020-01-02 DIAGNOSIS — M72.2 PLANTAR FASCIITIS OF RIGHT FOOT: ICD-10-CM

## 2020-01-02 PROCEDURE — 97110 THERAPEUTIC EXERCISES: CPT | Mod: GP | Performed by: PHYSICAL THERAPIST

## 2020-01-02 PROCEDURE — 97112 NEUROMUSCULAR REEDUCATION: CPT | Mod: GP | Performed by: PHYSICAL THERAPIST

## 2020-01-24 ENCOUNTER — THERAPY VISIT (OUTPATIENT)
Dept: PHYSICAL THERAPY | Facility: CLINIC | Age: 60
End: 2020-01-24
Payer: COMMERCIAL

## 2020-01-24 DIAGNOSIS — M72.2 PLANTAR FASCIITIS OF RIGHT FOOT: ICD-10-CM

## 2020-01-24 DIAGNOSIS — M72.2 PLANTAR FASCIITIS OF LEFT FOOT: ICD-10-CM

## 2020-01-24 PROCEDURE — 97110 THERAPEUTIC EXERCISES: CPT | Mod: GP | Performed by: PHYSICAL THERAPIST

## 2020-01-24 PROCEDURE — 97112 NEUROMUSCULAR REEDUCATION: CPT | Mod: GP | Performed by: PHYSICAL THERAPIST

## 2020-01-27 DIAGNOSIS — R60.0 PERIPHERAL EDEMA: ICD-10-CM

## 2020-01-27 DIAGNOSIS — I10 ESSENTIAL HYPERTENSION WITH GOAL BLOOD PRESSURE LESS THAN 140/90: ICD-10-CM

## 2020-01-27 RX ORDER — FUROSEMIDE 40 MG
TABLET ORAL
Qty: 90 TABLET | Refills: 0 | Status: SHIPPED | OUTPATIENT
Start: 2020-01-27 | End: 2020-04-27

## 2020-01-27 NOTE — TELEPHONE ENCOUNTER
"Prescription approved per [facility], [facility] or [facility] refill protocol.  Jaimie LAROSE - Registered Nurse  [facility]  Acute and Diagnostic Services        Requested Prescriptions   Pending Prescriptions Disp Refills     furosemide (LASIX) 40 MG tablet [Pharmacy Med Name: FUROSEMIDE 40 MG TABLET] 90 tablet 0     Sig: TAKE 1 TABLET BY MOUTH EVERY DAY       Diuretics (Including Combos) Protocol Passed - 1/27/2020  1:56 AM        Passed - Blood pressure under 140/90 in past 12 months     BP Readings from Last 3 Encounters:   12/17/19 132/82   12/13/19 136/68   10/22/19 138/82                 Passed - Recent (12 mo) or future (30 days) visit within the authorizing provider's specialty     Patient has had an office visit with the authorizing provider or a provider within the authorizing providers department within the previous 12 mos or has a future within next 30 days. See \"Patient Info\" tab in inbasket, or \"Choose Columns\" in Meds & Orders section of the refill encounter.              Passed - Medication is active on med list        Passed - Patient is age 18 or older        Passed - Normal serum creatinine on file in past 12 months     Recent Labs   Lab Test 10/30/19  0813   CR 0.93              Passed - Normal serum potassium on file in past 12 months     Recent Labs   Lab Test 10/30/19  0813   POTASSIUM 3.9                    Passed - Normal serum sodium on file in past 12 months     Recent Labs   Lab Test 10/30/19  0813                   "

## 2020-02-12 ENCOUNTER — OFFICE VISIT (OUTPATIENT)
Dept: SURGERY | Facility: CLINIC | Age: 60
End: 2020-02-12
Payer: COMMERCIAL

## 2020-02-12 VITALS
OXYGEN SATURATION: 98 % | BODY MASS INDEX: 38.36 KG/M2 | WEIGHT: 315 LBS | DIASTOLIC BLOOD PRESSURE: 80 MMHG | HEIGHT: 76 IN | SYSTOLIC BLOOD PRESSURE: 126 MMHG | HEART RATE: 68 BPM

## 2020-02-12 DIAGNOSIS — M72.2 PLANTAR FASCIITIS OF LEFT FOOT: ICD-10-CM

## 2020-02-12 DIAGNOSIS — I89.0 LYMPHEDEMA: Primary | ICD-10-CM

## 2020-02-12 DIAGNOSIS — M72.2 PLANTAR FASCIITIS OF RIGHT FOOT: ICD-10-CM

## 2020-02-12 DIAGNOSIS — E78.5 HYPERLIPIDEMIA LDL GOAL <160: ICD-10-CM

## 2020-02-12 DIAGNOSIS — I10 ESSENTIAL HYPERTENSION WITH GOAL BLOOD PRESSURE LESS THAN 140/90: ICD-10-CM

## 2020-02-12 PROBLEM — E66.01 MORBID OBESITY WITH BMI OF 50.0-59.9, ADULT (H): Status: ACTIVE | Noted: 2019-04-24

## 2020-02-12 PROCEDURE — 99213 OFFICE O/P EST LOW 20 MIN: CPT | Performed by: PHYSICIAN ASSISTANT

## 2020-02-12 ASSESSMENT — MIFFLIN-ST. JEOR: SCORE: 2838.64

## 2020-02-12 NOTE — PROGRESS NOTES
2020      Return Medical Weight Management Note     Da Mustafa  MRN:  [id number]  :  1960      Dear Robel Morris,    I had the pleasure of seeing your patient Da Mustafa.  He is a 59 year old male who I am continuing to see for treatment of obesity related to:       2019   I have the following health issues associated with obesity: High Blood Pressure, Sleep Apnea   I have the following symptoms associated with obesity: Knee Pain, Lymphedema       INTERVAL HISTORY:  Overall patient is doing well. His wife continues to plan his meals. She was gone for about 2 weeks but patient did well by himself.  He did make it to his podiatrist appointment and has good functioning orthotics for his shoes.      CURRENT WEIGHT:   423 lbs 4.8 oz    Wt Readings from Last 4 Encounters:   20 (!) 423 lb 4.8 oz (192 kg)   19 (!) 430 lb (195 kg)   19 (!) 430 lb 8 oz (195.3 kg)   19 (!) 435 lb 1.6 oz (197.4 kg)       BARIATRIC METRICS:  Current Weight: (!) 423 lb 4.8 oz (192 kg)  Body mass index is 52.21 kg/m .   Wt change since last visit (lbs): -7.2  Cumulative weight loss (lbs): 45.2      DIETARY HISTORY  Meals Per Day: 3  Food Diary:   B: 3 Scrambled eggs with turkey sausage with berries.  Water.  Sporadically drinks diet Mt Dew in the AM  L: large salad with steak or chicken.  Assorted veggies. Light Caesar dressing:large salad with steak or chicken.  Assorted veggies. Light Caesar dressing.  Dressing is put directly on salad  Yesterday had pizza leftovers  D: Last night had chicken pot pie.  Night before can not remember  Snacks Per Day: several  Typical Snack: turkey and chicken strips, mozzarella sticks, carrots and celery, fat free greek yogurt  Fluid Intake: 3 cans Mt Dew at work and possibly a 2 liter of diet Mt dew daily.  Typically 40 oz water  Typical Protein Food Choices: meat and dairy  Meals at Restaurant per week: pizza  Eating at the Table:  "No  TV/screen time During Meals: Yes    Positive Changes Since Last Visit: consistency  Struggling With: activity    Getting Adequate Protein: yes  Sleeping 7-8 hours/day 6   Stress management good      Exercise: None      ROS:  General  Fatigue: some  Sleep Quality: Has a cold so has been getting up coughing  Insomnia:  HEENT  Dry Mouth:   Cardiovascular  Chest Pain with Exertion: No  Palpitations:  No  Pulmonary  Shortness of breath at rest: No  Shortness of breath with exertion: No  Gastrointestinal  Nausea: No  Diarrhea: No  Heartburn: No  Psychiatric  Moods Stable:      MEDICATIONS:   Current Outpatient Medications   Medication     fluticasone (FLONASE) 50 MCG/ACT nasal spray     furosemide (LASIX) 40 MG tablet     garlic 150 MG TABS tablet     glucosamine-chondroitin 500-400 MG CAPS per capsule     lisinopril (PRINIVIL/ZESTRIL) 40 MG tablet     loratadine (CLARITIN) 10 MG tablet     multivitamin w/minerals (MULTI-VITAMIN) tablet     omega 3 1000 MG CAPS     order for DME     silver sulfADIAZINE (SILVADENE) 1 % external cream     No current facility-administered medications for this visit.          PE:  /80   Pulse 68   Ht 6' 3.5\" (1.918 m)   Wt (!) 423 lb 4.8 oz (192 kg)   SpO2 98%   BMI 52.21 kg/m    GENERAL Pt in NAD.  HEART: No JVD  LUNGS: Breathing unlabored.  MUSC: Gait normal  NEURO: Alert and oriented x3.     ASSESSMENT:   Morbid Obesity   Hypertension  Hyperlipidemia    PLAN: Patient was congratulated on his weight loss success thus far. Healthy habits to assist with further weight loss were discussed. DEONTE will continue the lifestyle changes he has started    Goal:  Walking twice weekly  Patient is doing a great job at being consistent with his lifestyle.  Challenged him to incorporate more activity.        FOLLOW-UP:    1 month diet  2 month PA      Time: 20 min spent on evaluation, management, counseling, education, & motivational interviewing with greater than 50 % of the total time was " spent on counseling and coordinating care    Sincerely,      Rabia Tena PA-C

## 2020-02-21 ENCOUNTER — THERAPY VISIT (OUTPATIENT)
Dept: PHYSICAL THERAPY | Facility: CLINIC | Age: 60
End: 2020-02-21
Payer: COMMERCIAL

## 2020-02-21 DIAGNOSIS — M72.2 PLANTAR FASCIITIS OF RIGHT FOOT: ICD-10-CM

## 2020-02-21 DIAGNOSIS — M72.2 PLANTAR FASCIITIS OF LEFT FOOT: ICD-10-CM

## 2020-02-21 PROCEDURE — 97112 NEUROMUSCULAR REEDUCATION: CPT | Mod: GP | Performed by: PHYSICAL THERAPIST

## 2020-02-21 PROCEDURE — 97110 THERAPEUTIC EXERCISES: CPT | Mod: GP | Performed by: PHYSICAL THERAPIST

## 2020-02-27 DIAGNOSIS — I10 ESSENTIAL HYPERTENSION WITH GOAL BLOOD PRESSURE LESS THAN 140/90: ICD-10-CM

## 2020-02-28 RX ORDER — LISINOPRIL 40 MG/1
40 TABLET ORAL DAILY
Qty: 30 TABLET | Refills: 0 | Status: SHIPPED | OUTPATIENT
Start: 2020-02-28 | End: 2020-04-27

## 2020-02-28 NOTE — TELEPHONE ENCOUNTER
"Medication is being filled for 1 time refill only due to:  Due for appointment and BP by April.   Should have enough pills from previous refill to last till April, additional miah refill given today.    Carmen Reddy RN  [facility]      Requested Prescriptions   Pending Prescriptions Disp Refills     lisinopril (ZESTRIL) 40 MG tablet [Pharmacy Med Name: LISINOPRIL 40 MG TABLET] 90 tablet 3     Sig: TAKE 1 TABLET (40 MG) BY MOUTH DAILY. *PLEASE SEE MD FOR APPT       ACE Inhibitors (Including Combos) Protocol Passed - 2/27/2020  6:22 PM        Passed - Blood pressure under 140/90 in past 12 months     BP Readings from Last 3 Encounters:   02/12/20 126/80   12/17/19 132/82   12/13/19 136/68                 Passed - Recent (12 mo) or future (30 days) visit within the authorizing provider's specialty     Patient has had an office visit with the authorizing provider or a provider within the authorizing providers department within the previous 12 mos or has a future within next 30 days. See \"Patient Info\" tab in inbasket, or \"Choose Columns\" in Meds & Orders section of the refill encounter.              Passed - Medication is active on med list        Passed - Patient is age 18 or older        Passed - Normal serum creatinine on file in past 12 months     Recent Labs   Lab Test 10/30/19  0813   CR 0.93             Passed - Normal serum potassium on file in past 12 months     Recent Labs   Lab Test 10/30/19  0813   POTASSIUM 3.9             Last Written Prescription Date:  4/24/19  Last Fill Quantity: 90,  # refills: 3   Last office visit: 4/24/2019 with prescribing provider:     Future Office Visit:      "

## 2020-03-04 ENCOUNTER — ALLIED HEALTH/NURSE VISIT (OUTPATIENT)
Dept: SURGERY | Facility: CLINIC | Age: 60
End: 2020-03-04
Payer: COMMERCIAL

## 2020-03-04 VITALS — HEIGHT: 76 IN | WEIGHT: 315 LBS | BODY MASS INDEX: 38.36 KG/M2

## 2020-03-04 DIAGNOSIS — E66.01 MORBID OBESITY WITH BMI OF 50.0-59.9, ADULT (H): ICD-10-CM

## 2020-03-04 PROCEDURE — 97803 MED NUTRITION INDIV SUBSEQ: CPT | Performed by: DIETITIAN, REGISTERED

## 2020-03-04 ASSESSMENT — MIFFLIN-ST. JEOR: SCORE: 2867.21

## 2020-03-04 NOTE — PROGRESS NOTES
MEDICAL WEIGHT LOSS FOLLOW UP      DIAGNOSIS:  Class III Obesity    NUTRITION HISTORY:    Breakfast: egg bake with ham and non-starchy veggies, fresh berries or skinny bagel with ham/ low-fat cheese omelette     Lunch:  Large lettuce salad with tomatoes, cumbers, chicken or tuna, plain non-fat yogurt + Stevia+ frozen berries     Dinner:  Stuffed peppers, asparagus or 2 sloppy mj's on bun, tater tots or pork loin, red potatoes, asparagus     Snacks:  Celery sticks/ carrots/ 2 turkey jerky, 2 string cheese     Beverage Choices:  ~60 oz water, 2-2.5 L. Bottles + 3 cans Diet Mountain Dew      Exercise: none (not doing PT exercises as advised)     Other: Spouse, Janna was present for the visit. Patient is not interested in having weight loss surgery (has attended an information session). He is doing Physical therapy for plantar fascitis. Patient does not want to cut back on his intake of diet pop.  He is pleased with his weight loss. Spouse does all of the cooking at home and is looking for recipe ideas. Spouse seems to be supportive of patients weight loss journey, but seems to not understand why he does not want to have weight loss surgery.    ANTHROPOMETRICS:    Initial Weight: 463.2 pounds    Previous Weight:  435.1 pounds    Current Weight:  429.6 pounds    Weight Change:  decrease 5.5 pounds    BMI: 52.99 kg/m2    MEDICATIONS FOR WEIGHT LOSS:  none    EVALUATION/PROGRESS TOWARDS GOALS:  Previous Goals:  Explore tracking intake on lane (offered suggestions) and aim for 5983-1065 kcal/day-not met, ut is logging in a note book  [facility]  Schedule visit with a provider for foot pain-met (saw podiatry and doing PT)    Previous Nutrition Diagnosis:  Obese class III related to excess energy intake as evidence by BMI of 53.67 kg/m2     Current Nutrition Diagnosis:   Obese class III related to excess energy intake as evidence by BMI of 52.99 kg/m2  No  change      INTERVENTION:    Nutrition Prescription:  Recommend modified nutrient intake by decreasing energy intake    Implementation:    Meals and Snacks: 3/2    Nutrition Education (Content):    Discussed previous goals and determined new goals    Encourage physical activity  Provided and reviewed information on Volumetric eating guidelines    Supported patient in attempted weight loss and behavior changes    Congratulated patient on successful weight loss     Patient verbalizes understanding by expressing interest in tracking intake    Anticipate good compliance    Goals:  Ask Physical Therapy to advise on appropriate exercise regimen  Track intake and activity on lane and aim for 2200 kcal (11 kcal/kg)  Explore healthy recipe ideas with spouse (offered suggestion to look for recipes)    Follow Up/Monitoring:  Other  -  patient to follow up in 8 weeks (pt prefers every 8 weeks)    Time Spent With Patient:  35 Minutes  Chidi Feliz RD, LD  [facility]  [phone]

## 2020-04-13 PROBLEM — M72.2 PLANTAR FASCIITIS OF LEFT FOOT: Status: RESOLVED | Noted: 2019-12-20 | Resolved: 2020-04-13

## 2020-04-13 PROBLEM — M72.2 PLANTAR FASCIITIS OF RIGHT FOOT: Status: RESOLVED | Noted: 2019-12-20 | Resolved: 2020-04-13

## 2020-04-13 NOTE — PROGRESS NOTES
Discharge Note    Progress reporting period is from initial evaluation date (please see noted date below) to Feb 21, 2020.  Linked Episodes   Type: Episode: Status: Noted: Resolved: Last update: Updated by:   PHYSICAL THERAPY B feet 12/20/2019 Active 12/20/2019  3/6/2020  4:44 PM Estrella Judd, PT      Comments:       Da failed to follow up and current status is unknown.  Please see information below for last relevant information on current status.  Patient seen for 4 visits.    SUBJECTIVE  Subjective changes noted by patient:  For the past couple of weeks there was very little pain. Then a few days ago the pain started to increase again. Hasn't been working on the exercises. At times feels he is limping secondary to pain.  .  Current pain level is 4/10.     Previous pain level was  3/10.   Changes in function:  Yes (See Goal flowsheet attached for changes in current functional level)  Adverse reaction to treatment or activity: None    OBJECTIVE  Changes noted in objective findings: Strength Ankle Inv L: +3/5, R: 5/5     ASSESSMENT/PLAN  Diagnosis: B (L>R) plantar fascitis   Updated problem list and treatment plan:   Pain - HEP  Decreased function - HEP  Decreased strength - HEP  Impaired muscle performance - HEP  Impaired gait - HEP  STG/LTGs have been met or progress has been made towards goals:  Yes, please see goal flowsheet for most current information  Assessment of Progress: current status is unknown.    Last current status: Pt has not made progress   Self Management Plans:  HEP  I have re-evaluated this patient and find that the nature, scope, duration and intensity of the therapy is appropriate for the medical condition of the patient.  Da continues to require the following intervention to meet STG and LTG's:  HEP.    Recommendations:  Discharge with current home program.  Patient to follow up with MD as needed.    Please refer to the daily flowsheet for treatment today, total treatment time and  time spent performing 1:1 timed codes.

## 2020-04-27 ENCOUNTER — MYC MEDICAL ADVICE (OUTPATIENT)
Dept: FAMILY MEDICINE | Facility: CLINIC | Age: 60
End: 2020-04-27

## 2020-04-27 ENCOUNTER — VIRTUAL VISIT (OUTPATIENT)
Dept: FAMILY MEDICINE | Facility: CLINIC | Age: 60
End: 2020-04-27
Payer: COMMERCIAL

## 2020-04-27 DIAGNOSIS — I89.0 LYMPHEDEMA: ICD-10-CM

## 2020-04-27 DIAGNOSIS — Z13.220 SCREENING FOR HYPERLIPIDEMIA: ICD-10-CM

## 2020-04-27 DIAGNOSIS — Z12.5 SCREENING FOR PROSTATE CANCER: Primary | ICD-10-CM

## 2020-04-27 DIAGNOSIS — R60.0 PERIPHERAL EDEMA: ICD-10-CM

## 2020-04-27 DIAGNOSIS — I10 ESSENTIAL HYPERTENSION WITH GOAL BLOOD PRESSURE LESS THAN 140/90: ICD-10-CM

## 2020-04-27 PROCEDURE — 99214 OFFICE O/P EST MOD 30 MIN: CPT | Mod: GT | Performed by: PHYSICIAN ASSISTANT

## 2020-04-27 RX ORDER — LISINOPRIL 40 MG/1
40 TABLET ORAL DAILY
Qty: 90 TABLET | Refills: 0 | Status: SHIPPED | OUTPATIENT
Start: 2020-04-27 | End: 2020-10-20

## 2020-04-27 RX ORDER — FUROSEMIDE 40 MG
40 TABLET ORAL DAILY
Qty: 90 TABLET | Refills: 0 | Status: SHIPPED | OUTPATIENT
Start: 2020-04-27 | End: 2020-07-20

## 2020-04-27 NOTE — PROGRESS NOTES
"Da Mustafa is a 59 year old male who is being evaluated via a billable video visit.      The patient has been notified of following:     \"This video visit will be conducted via a call between you and your physician/provider. We have found that certain health care needs can be provided without the need for an in-person physical exam.  This service lets us provide the care you need with a video conversation.  If a prescription is necessary we can send it directly to your pharmacy.  If lab work is needed we can place an order for that and you can then stop by our lab to have the test done at a later time.    Video visits are billed at different rates depending on your insurance coverage.  Please reach out to your insurance provider with any questions.    If during the course of the call the physician/provider feels a video visit is not appropriate, you will not be charged for this service.\"    Patient has given verbal consent for Video visit? Yes    How would you like to obtain your AVS? Thania    Patient would like the video invitation sent by: Text to cell phone: [phone]    Will anyone else be joining your video visit? No      Subjective     Da Mustafa is a 59 year old male who presents to clinic today for the following health issues:    HPI  Medication Followup of Lasix    Taking Medication as prescribed: yes    Side Effects:  None    Medication Helping Symptoms:  yes         Video Start Time: 11:40    Hypertension Follow-up      Do you check your blood pressure regularly outside of the clinic? Yes     Are you following a low salt diet? No    Are your blood pressures ever more than 140 on the top number (systolic) OR more   than 90 on the bottom number (diastolic), for example 140/90? No    History of peripheral edema stable on lasix. No worsening.     Ongoing lymphedema. Managed with wrapping and elevation, but right leg continues to weep intermittently. Has improved over the last 1 year. " Has lost ~40 lbs since starting working with weight loss clinic. Has appt scheduled on 5/8/2020. Has seen lymphedema clinic in the past and now managing with home wrapping.         Patient Active Problem List   Diagnosis     Edema     Family history of diabetes mellitus     Seasonal allergic rhinitis     Hyperlipidemia LDL goal <160     Essential hypertension with goal blood pressure less than 140/90     Arthritis of left foot     Class 3 obesity due to excess calories without serious comorbidity with body mass index (BMI) of 50.0 to 59.9 in adult     Venous stasis dermatitis of both lower extremities     Peripheral edema     Lymphedema     Morbid obesity with BMI of 50.0-59.9, adult (H)     Past Surgical History:   Procedure Laterality Date     C NONSPECIFIC PROCEDURE       COLONOSCOPY  12/4/2013    Procedure: COLONOSCOPY;  Colonoscopy  ;  Surgeon: Beny Rich MD;  Location:  GI       Social History     Tobacco Use     Smoking status: Never Smoker     Smokeless tobacco: Never Used   Substance Use Topics     Alcohol use: Yes     Comment: rare     Family History   Problem Relation Age of Onset     Cancer Father          Current Outpatient Medications   Medication Sig Dispense Refill     furosemide (LASIX) 40 MG tablet Take 1 tablet (40 mg) by mouth daily 90 tablet 0     lisinopril (ZESTRIL) 40 MG tablet Take 1 tablet (40 mg) by mouth daily 90 tablet 0     fluticasone (FLONASE) 50 MCG/ACT nasal spray SPRAY 2 SPRAYS INTO BOTH NOSTRILS DAILY 16 mL 2     garlic 150 MG TABS tablet Take 150 mg by mouth daily       glucosamine-chondroitin 500-400 MG CAPS per capsule Take 1 capsule by mouth daily       loratadine (CLARITIN) 10 MG tablet Take 10 mg by mouth daily       multivitamin w/minerals (MULTI-VITAMIN) tablet Take 1 tablet by mouth daily       omega 3 1000 MG CAPS        order for DME BLE knee high custom compression stockings 20-30 or 30-40 mm Hg compression stockings 1 each 0     silver sulfADIAZINE  "(SILVADENE) 1 % external cream Apply topically 2 times daily 85 g 1     Allergies   Allergen Reactions     No Known Drug Allergies      Seasonal Allergies      Recent Labs   Lab Test 10/30/19  0813 09/20/19  0950 04/24/19  1009  07/03/18  1034 12/27/17  0949 09/06/16  1010  08/05/13  1716   A1C  --   --   --   --  5.5  --   --   --   --    LDL  --   --  89  --   --  79 104*   < > 85   HDL  --   --  38*  --   --  41 41   < > 31*   TRIG  --   --  90  --   --  146 86   < > 145   ALT  --   --  27  --   --  24 21   < > 31   CR 0.93  --  0.93   < > 0.84 0.68 0.81   < > 0.89   GFRESTIMATED >90  --  >90   < > >90 >90 >90  Non African American GFR Calc     < > >90   GFRESTBLACK >90  --  >90   < > >90 >90 >90  African American GFR Calc     < > >90   POTASSIUM 3.9  --  3.8   < > 4.4 4.1 4.2   < > 3.9   TSH  --  1.87  --   --   --   --   --   --  1.25    < > = values in this interval not displayed.      BP Readings from Last 3 Encounters:   02/12/20 126/80   12/17/19 132/82   12/13/19 136/68    Wt Readings from Last 3 Encounters:   03/04/20 (!) 194.9 kg (429 lb 9.6 oz)   02/12/20 (!) 192 kg (423 lb 4.8 oz)   12/17/19 (!) 195 kg (430 lb)                    Reviewed and updated as needed this visit by Provider  Tobacco  Allergies  Meds  Problems  Med Hx  Surg Hx  Fam Hx         Review of Systems   ROS COMP: Constitutional, HEENT, cardiovascular, pulmonary, GI, , musculoskeletal, neuro, skin, psych systems are negative, except as otherwise noted.      Objective    There were no vitals taken for this visit.  Estimated body mass index is 52.99 kg/m  as calculated from the following:    Height as of 3/4/20: 1.918 m (6' 3.5\").    Weight as of 3/4/20: 194.9 kg (429 lb 9.6 oz).  Physical Exam     GENERAL: alert, no distress and obese  RESP: no visible respiratory distress.   PSYCH: mentation appears normal, affect normal/bright, judgement and insight intact, normal speech and appearance well-groomed      Diagnostic Test " Results:  none         Assessment & Plan     (Z12.5) Screening for prostate cancer  (primary encounter diagnosis)  Comment: due for yearly screening. Patient would like to obtain this at future lab as below.  Plan: **Prostate spec antigen screen FUTURE anytime            (I10) Essential hypertension with goal blood pressure less than 140/90  Comment: stable per patient. Recommending continued monitoring as ongoing weight loss. If dizziness or fatigue develop and/or if bp drops below 100/60, patient to call. Will maintain current regimen. Labs to be obtained at future fasting lab only after covid-19 crisis in July. Patient to schedule. Future reminder message sent via Workspace.   Plan: lisinopril (ZESTRIL) 40 MG tablet, furosemide         (LASIX) 40 MG tablet, **Comprehensive metabolic        panel FUTURE anytime        -Medication use and side effects discussed with the patient. Patient is in complete understanding and agreement with plan.       (R60.9) Peripheral edema  Comment: as above   Plan: furosemide (LASIX) 40 MG tablet,         **Comprehensive metabolic panel FUTURE anytime,        REVIEW OF HEALTH MAINTENANCE PROTOCOL ORDERS            (Z13.220) Screening for hyperlipidemia  Comment: as above.   Plan: Lipid panel reflex to direct LDL Fasting            (I89.0) Lymphedema  Comment: ongoing. Improving with weight loss. Will continue on current regimen and if with continued weight loss no improvement seen in   Plan: REVIEW OF HEALTH MAINTENANCE PROTOCOL ORDERS                 Return in about 10 weeks (around 7/6/2020) for Lab Work-fasting lab only .    Robel Morris PA-C  [facility]      Video-Visit Details    Type of service:  Video Visit    Video End Time:11:59    Originating Location (pt. Location): Home    Distant Location (provider location):  [facility]     Mode of Communication:  Video Conference via Thrupoint for 5 minutes, then failed. Changed to  Doximity thereafter.     Return in about 10 weeks (around 7/6/2020) for Lab Work-fasting lab only .       Robel Morris PA-C

## 2020-07-03 ENCOUNTER — VIRTUAL VISIT (OUTPATIENT)
Dept: FAMILY MEDICINE | Facility: CLINIC | Age: 60
End: 2020-07-03
Payer: COMMERCIAL

## 2020-07-03 ENCOUNTER — NURSE TRIAGE (OUTPATIENT)
Dept: NURSING | Facility: CLINIC | Age: 60
End: 2020-07-03

## 2020-07-03 DIAGNOSIS — I10 ESSENTIAL HYPERTENSION WITH GOAL BLOOD PRESSURE LESS THAN 140/90: ICD-10-CM

## 2020-07-03 DIAGNOSIS — J06.9 UPPER RESPIRATORY TRACT INFECTION, UNSPECIFIED TYPE: Primary | ICD-10-CM

## 2020-07-03 PROCEDURE — 99213 OFFICE O/P EST LOW 20 MIN: CPT | Mod: TEL | Performed by: FAMILY MEDICINE

## 2020-07-03 NOTE — TELEPHONE ENCOUNTER
Caller reports he has been unwell for 4 days; fever to 102.0  In pat  24 hrs;  Taste is of and vomited  Ice cream ; muscle aches;  History of  HTN, lymph edema of lower extremities ; obesity    Afebrile today but hs muscle aches  Triage protocol reviewed   advised  To have virtual visit with provider today    Caller understands  and call transferred to    Ekaterina Andres RN  FNA    COVID 19 Nurse Triage Plan/Patient Instructions    Please be aware that novel coronavirus (COVID-19) may be circulating in the community. If you develop symptoms such as fever, cough, or SOB or if you have concerns about the presence of another infection including coronavirus (COVID-19), please contact your health care provider or visit [email or web address].     Disposition/Instructions    Patient to schedule a Virtual Visit with provider. Reference Visit Selection Guide.    Thank you for taking steps to prevent the spread of this virus.  o Limit your contact with others.  o Wear a simple mask to cover your cough.  o Wash your hands well and often.    Resources    [facility]: About COVID-19: [email or web address]    CDC: What to Do If You're Sick: www.cdc.gov/coronavirus/2019-ncov/about/steps-when-sick.html    CDC: Ending Home Isolation: www.cdc.gov/coronavirus/2019-ncov/hcp/disposition-in-home-patients.html     CDC: Caring for Someone: www.cdc.gov/coronavirus/2019-ncov/if-you-are-sick/care-for-someone.html     [facility]: Interim Guidance for Hospital Discharge to Home: www.health.[facility].mn.us/diseases/coronavirus/hcp/hospdischarge.pdf    [facility] clinical trials (COVID-19 research studies): [email or web address]     Below are the COVID-19 hotlines at the [facility] of Health ([facility]). Interpreters are available.   o For health questions: Call 217-386-6969 or 1-812.874.8732 (7 a.m. to 7 p.m.)  o For questions about schools and childcare: Call 679-511-5504 or 1-581.329.6577 (7 a.m.  to 7 p.m.)                                        Reason for Disposition    Fever present > 3 days (72 hours)    Additional Information    Negative: SEVERE difficulty breathing (e.g., struggling for each breath, speaks in single words)    Negative: Difficult to awaken or acting confused (e.g., disoriented, slurred speech)    Negative: Bluish (or gray) lips or face now    Negative: Shock suspected (e.g., cold/pale/clammy skin, too weak to stand, low BP, rapid pulse)    Negative: Sounds like a life-threatening emergency to the triager    Negative: [1] COVID-19 exposure AND [2] no symptoms    Negative: COVID-19 and Breastfeeding, questions about    Negative: [1] Adult with possible COVID-19 symptoms AND [2] triager concerned about severity of symptoms or other causes    Negative: SEVERE or constant chest pain or pressure (Exception: mild central chest pain, present only when coughing)    Negative: MODERATE difficulty breathing (e.g., speaks in phrases, SOB even at rest, pulse 100-120)    Negative: Patient sounds very sick or weak to the triager    Negative: MILD difficulty breathing (e.g., minimal/no SOB at rest, SOB with walking, pulse <100)    Negative: Chest pain or pressure    Negative: Fever > 103 F (39.4 C)    Negative: [1] Fever > 101 F (38.3 C) AND [2] age > 60    Negative: [1] Fever > 100.0 F (37.8 C) AND [2] bedridden (e.g., nursing home patient, CVA, chronic illness, recovering from surgery)    Negative: HIGH RISK patient (e.g., age > 64 years, diabetes, heart or lung disease, weak immune system)    Protocols used: CORONAVIRUS (COVID-19) DIAGNOSED OR [id number] 5.16.20

## 2020-07-03 NOTE — PROGRESS NOTES
"Da Mustafa is a 59 year old male who is being evaluated via a billable telephone visit.      The patient has been notified of following:     \"This telephone visit will be conducted via a call between you and your physician/provider. We have found that certain health care needs can be provided without the need for a physical exam.  This service lets us provide the care you need with a short phone conversation.  If a prescription is necessary we can send it directly to your pharmacy.  If lab work is needed we can place an order for that and you can then stop by our lab to have the test done at a later time.    Telephone visits are billed at different rates depending on your insurance coverage. During this emergency period, for some insurers they may be billed the same as an in-person visit.  Please reach out to your insurance provider with any questions.    If during the course of the call the physician/provider feels a telephone visit is not appropriate, you will not be charged for this service.\"    Patient has given verbal consent for Telephone visit?  Yes    What phone number would you like to be contacted at? [phone]    How would you like to obtain your AVS? Thania Rios     Da Mustafa is a 59 year old male who presents via phone visit today for the following health issues:    HPI    Concern for COVID-19  About how many days ago did these symptoms start? 4 days  Is this your first visit for this illness? Yes  In the 14 days before your symptoms started, have you had close contact with someone with COVID-19 (Coronavirus)? I do not know.  Do you have a fever or chills? Yes, the highest temperature was 102.0  Are you having new or worsening difficulty breathing? No  Do you have new or worsening cough? No  Have you had any new or unexplained body aches? YES    Have you experienced any of the following NEW symptoms?    Headache: YES    Sore throat: No    Loss of taste or smell: " YES    Chest pain: No    Diarrhea: No    Rash: YES no sx  What treatments have you tried? Acetaminaphine  Who do you live with? Wife, dog  Are you, or a household member, a healthcare worker or a ? No  Do you live in a nursing home, group home, or shelter? No  Do you have a way to get food/medications if quarantined? Yes, I have a friend or family member who can help me.               Past Medical History:   Diagnosis Date     Class 3 obesity due to excess calories without serious comorbidity with body mass index (BMI) of 50.0 to 59.9 in adult 12/27/2017     Edema      Essential hypertension with goal blood pressure less than 140/90 7/25/2016     Essential hypertension, benign      Hyperlipidemia LDL goal <160 7/28/2015     Hypertension goal BP (blood pressure) < 140/90 4/25/2013     Obesity, unspecified      Seasonal allergic rhinitis 5/19/2014     Sleep apnea        Past Surgical History:   Procedure Laterality Date     C NONSPECIFIC PROCEDURE       COLONOSCOPY  12/4/2013    Procedure: COLONOSCOPY;  Colonoscopy  ;  Surgeon: Beny Rich MD;  Location:  GI       Family History   Problem Relation Age of Onset     Cancer Father        Social History     Tobacco Use     Smoking status: Never Smoker     Smokeless tobacco: Never Used   Substance Use Topics     Alcohol use: Yes     Comment: rare         Reviewed and updated as needed this visit by Provider  Allergies  Meds  Med Hx  Surg Hx         Review of Systems   As above.  He has been checking his temperature and his oxygenation.  His oxygenation is 91 to 99%       Objective   Reported vitals:  There were no vitals taken for this visit.   healthy, alert and no distress  PSYCH: Alert  coherent speech, RESP: No cough, no audible wheezing, able to talk in full sentences  Remainder of exam unable to be completed due to telephone visits            Assessment/Plan:  ASSESSMENT / PLAN:  (J06.9) Upper respiratory tract infection, unspecified type   (primary encounter diagnosis)  Comment:test  Plan: Symptomatic COVID-19 Virus (Coronavirus) by PCR      Discussed isolation until his test results are back      He needs a BMP.  It may be possible to do that at his COVID testing site    Lex Castro MD        Return in about 2 weeks (around 7/17/2020) for If unable to do BMP at COVID testing site, needs lab only appointment here.      Phone call duration:  4 minutes    Lex Castro MD

## 2020-07-06 ENCOUNTER — NURSE TRIAGE (OUTPATIENT)
Dept: FAMILY MEDICINE | Facility: CLINIC | Age: 60
End: 2020-07-06

## 2020-07-06 ENCOUNTER — APPOINTMENT (OUTPATIENT)
Dept: ULTRASOUND IMAGING | Facility: CLINIC | Age: 60
DRG: 872 | End: 2020-07-06
Attending: EMERGENCY MEDICINE
Payer: COMMERCIAL

## 2020-07-06 ENCOUNTER — APPOINTMENT (OUTPATIENT)
Dept: GENERAL RADIOLOGY | Facility: CLINIC | Age: 60
DRG: 872 | End: 2020-07-06
Attending: EMERGENCY MEDICINE
Payer: COMMERCIAL

## 2020-07-06 ENCOUNTER — HOSPITAL ENCOUNTER (INPATIENT)
Facility: CLINIC | Age: 60
LOS: 9 days | Discharge: HOME | DRG: 872 | End: 2020-07-15
Attending: EMERGENCY MEDICINE | Admitting: INTERNAL MEDICINE
Payer: COMMERCIAL

## 2020-07-06 DIAGNOSIS — L03.116 CELLULITIS OF LEFT LOWER EXTREMITY: ICD-10-CM

## 2020-07-06 DIAGNOSIS — A41.9 SEPSIS WITH ACUTE RENAL FAILURE, DUE TO UNSPECIFIED ORGANISM, UNSPECIFIED ACUTE RENAL FAILURE TYPE, UNSPECIFIED WHETHER SEPTIC SHOCK PRESENT (H): ICD-10-CM

## 2020-07-06 DIAGNOSIS — R65.20 SEPSIS WITH ACUTE RENAL FAILURE, DUE TO UNSPECIFIED ORGANISM, UNSPECIFIED ACUTE RENAL FAILURE TYPE, UNSPECIFIED WHETHER SEPTIC SHOCK PRESENT (H): ICD-10-CM

## 2020-07-06 DIAGNOSIS — N17.9 SEPSIS WITH ACUTE RENAL FAILURE, DUE TO UNSPECIFIED ORGANISM, UNSPECIFIED ACUTE RENAL FAILURE TYPE, UNSPECIFIED WHETHER SEPTIC SHOCK PRESENT (H): ICD-10-CM

## 2020-07-06 LAB
ALBUMIN SERPL-MCNC: 2.1 G/DL (ref 3.4–5)
ALP SERPL-CCNC: 101 U/L (ref 40–150)
ALT SERPL W P-5'-P-CCNC: 18 U/L (ref 0–70)
ANION GAP SERPL CALCULATED.3IONS-SCNC: 10 MMOL/L (ref 3–14)
ANION GAP SERPL CALCULATED.3IONS-SCNC: 8 MMOL/L (ref 3–14)
AST SERPL W P-5'-P-CCNC: 12 U/L (ref 0–45)
BASOPHILS # BLD AUTO: 0.2 10E9/L (ref 0–0.2)
BASOPHILS NFR BLD AUTO: 0.7 %
BILIRUB DIRECT SERPL-MCNC: 0.1 MG/DL (ref 0–0.2)
BILIRUB SERPL-MCNC: 0.4 MG/DL (ref 0.2–1.3)
BUN SERPL-MCNC: 48 MG/DL (ref 7–30)
BUN SERPL-MCNC: 48 MG/DL (ref 7–30)
CALCIUM SERPL-MCNC: 7.8 MG/DL (ref 8.5–10.1)
CALCIUM SERPL-MCNC: 8.7 MG/DL (ref 8.5–10.1)
CHLORIDE SERPL-SCNC: 96 MMOL/L (ref 94–109)
CHLORIDE SERPL-SCNC: 99 MMOL/L (ref 94–109)
CO2 SERPL-SCNC: 25 MMOL/L (ref 20–32)
CO2 SERPL-SCNC: 27 MMOL/L (ref 20–32)
CREAT SERPL-MCNC: 3.04 MG/DL (ref 0.66–1.25)
CREAT SERPL-MCNC: 3.18 MG/DL (ref 0.66–1.25)
DIFFERENTIAL METHOD BLD: ABNORMAL
EOSINOPHIL # BLD AUTO: 0 10E9/L (ref 0–0.7)
EOSINOPHIL NFR BLD AUTO: 0.1 %
ERYTHROCYTE [DISTWIDTH] IN BLOOD BY AUTOMATED COUNT: 12.7 % (ref 10–15)
GFR SERPL CREATININE-BSD FRML MDRD: 20 ML/MIN/{1.73_M2}
GFR SERPL CREATININE-BSD FRML MDRD: 22 ML/MIN/{1.73_M2}
GLUCOSE SERPL-MCNC: 131 MG/DL (ref 70–99)
GLUCOSE SERPL-MCNC: 139 MG/DL (ref 70–99)
HCT VFR BLD AUTO: 43.1 % (ref 40–53)
HGB BLD-MCNC: 13.9 G/DL (ref 13.3–17.7)
IMM GRANULOCYTES # BLD: 1 10E9/L (ref 0–0.4)
IMM GRANULOCYTES NFR BLD: 4.4 %
INR PPP: 1.1 (ref 0.86–1.14)
LACTATE BLD-SCNC: 1.1 MMOL/L (ref 0.7–2)
LACTATE BLD-SCNC: 2.3 MMOL/L (ref 0.7–2)
LYMPHOCYTES # BLD AUTO: 1.5 10E9/L (ref 0.8–5.3)
LYMPHOCYTES NFR BLD AUTO: 6.9 %
MCH RBC QN AUTO: 29.8 PG (ref 26.5–33)
MCHC RBC AUTO-ENTMCNC: 32.3 G/DL (ref 31.5–36.5)
MCV RBC AUTO: 93 FL (ref 78–100)
MONOCYTES # BLD AUTO: 1.5 10E9/L (ref 0–1.3)
MONOCYTES NFR BLD AUTO: 7 %
NEUTROPHILS # BLD AUTO: 17.7 10E9/L (ref 1.6–8.3)
NEUTROPHILS NFR BLD AUTO: 80.9 %
NRBC # BLD AUTO: 0 10*3/UL
NRBC BLD AUTO-RTO: 0 /100
NT-PROBNP SERPL-MCNC: 117 PG/ML (ref 0–900)
PLATELET # BLD AUTO: 317 10E9/L (ref 150–450)
POTASSIUM SERPL-SCNC: 3.1 MMOL/L (ref 3.4–5.3)
POTASSIUM SERPL-SCNC: 3.4 MMOL/L (ref 3.4–5.3)
PROCALCITONIN SERPL-MCNC: 2.34 NG/ML
PROT SERPL-MCNC: 6.4 G/DL (ref 6.8–8.8)
RBC # BLD AUTO: 4.66 10E12/L (ref 4.4–5.9)
SARS-COV-2 PCR COMMENT: NORMAL
SARS-COV-2 RNA SPEC QL NAA+PROBE: NEGATIVE
SARS-COV-2 RNA SPEC QL NAA+PROBE: NORMAL
SODIUM SERPL-SCNC: 131 MMOL/L (ref 133–144)
SODIUM SERPL-SCNC: 134 MMOL/L (ref 133–144)
SPECIMEN SOURCE: NORMAL
SPECIMEN SOURCE: NORMAL
WBC # BLD AUTO: 21.9 10E9/L (ref 4–11)

## 2020-07-06 PROCEDURE — 25000128 H RX IP 250 OP 636: Performed by: INTERNAL MEDICINE

## 2020-07-06 PROCEDURE — 80048 BASIC METABOLIC PNL TOTAL CA: CPT | Performed by: EMERGENCY MEDICINE

## 2020-07-06 PROCEDURE — 83880 ASSAY OF NATRIURETIC PEPTIDE: CPT | Performed by: EMERGENCY MEDICINE

## 2020-07-06 PROCEDURE — 96361 HYDRATE IV INFUSION ADD-ON: CPT

## 2020-07-06 PROCEDURE — 96367 TX/PROPH/DG ADDL SEQ IV INF: CPT

## 2020-07-06 PROCEDURE — 99285 EMERGENCY DEPT VISIT HI MDM: CPT | Mod: 25

## 2020-07-06 PROCEDURE — 99223 1ST HOSP IP/OBS HIGH 75: CPT | Mod: AI | Performed by: INTERNAL MEDICINE

## 2020-07-06 PROCEDURE — 36415 COLL VENOUS BLD VENIPUNCTURE: CPT

## 2020-07-06 PROCEDURE — 85025 COMPLETE CBC W/AUTO DIFF WBC: CPT | Performed by: EMERGENCY MEDICINE

## 2020-07-06 PROCEDURE — 84145 PROCALCITONIN (PCT): CPT | Performed by: EMERGENCY MEDICINE

## 2020-07-06 PROCEDURE — U0003 INFECTIOUS AGENT DETECTION BY NUCLEIC ACID (DNA OR RNA); SEVERE ACUTE RESPIRATORY SYNDROME CORONAVIRUS 2 (SARS-COV-2) (CORONAVIRUS DISEASE [COVID-19]), AMPLIFIED PROBE TECHNIQUE, MAKING USE OF HIGH THROUGHPUT TECHNOLOGIES AS DESCRIBED BY CMS-2020-01-R: HCPCS | Performed by: EMERGENCY MEDICINE

## 2020-07-06 PROCEDURE — 12000000 ZZH R&B MED SURG/OB

## 2020-07-06 PROCEDURE — 25800030 ZZH RX IP 258 OP 636: Performed by: INTERNAL MEDICINE

## 2020-07-06 PROCEDURE — 96365 THER/PROPH/DIAG IV INF INIT: CPT

## 2020-07-06 PROCEDURE — 83605 ASSAY OF LACTIC ACID: CPT | Performed by: EMERGENCY MEDICINE

## 2020-07-06 PROCEDURE — 25000128 H RX IP 250 OP 636: Performed by: EMERGENCY MEDICINE

## 2020-07-06 PROCEDURE — 25800030 ZZH RX IP 258 OP 636: Performed by: EMERGENCY MEDICINE

## 2020-07-06 PROCEDURE — 96366 THER/PROPH/DIAG IV INF ADDON: CPT

## 2020-07-06 PROCEDURE — 80076 HEPATIC FUNCTION PANEL: CPT | Performed by: INTERNAL MEDICINE

## 2020-07-06 PROCEDURE — 71045 X-RAY EXAM CHEST 1 VIEW: CPT

## 2020-07-06 PROCEDURE — 85610 PROTHROMBIN TIME: CPT | Performed by: EMERGENCY MEDICINE

## 2020-07-06 PROCEDURE — 25000132 ZZH RX MED GY IP 250 OP 250 PS 637: Performed by: INTERNAL MEDICINE

## 2020-07-06 PROCEDURE — 93971 EXTREMITY STUDY: CPT | Mod: LT

## 2020-07-06 PROCEDURE — 25000125 ZZHC RX 250: Performed by: INTERNAL MEDICINE

## 2020-07-06 PROCEDURE — 87040 BLOOD CULTURE FOR BACTERIA: CPT | Performed by: EMERGENCY MEDICINE

## 2020-07-06 RX ORDER — FLUTICASONE PROPIONATE 50 MCG
1 SPRAY, SUSPENSION (ML) NASAL DAILY PRN
COMMUNITY
End: 2021-03-05

## 2020-07-06 RX ORDER — CEFAZOLIN SODIUM 1 G/50ML
1 INJECTION, SOLUTION INTRAVENOUS EVERY 12 HOURS
Status: DISCONTINUED | OUTPATIENT
Start: 2020-07-06 | End: 2020-07-07

## 2020-07-06 RX ORDER — CLINDAMYCIN PHOSPHATE 900 MG/50ML
900 INJECTION, SOLUTION INTRAVENOUS EVERY 8 HOURS
Status: DISCONTINUED | OUTPATIENT
Start: 2020-07-06 | End: 2020-07-10

## 2020-07-06 RX ORDER — HEPARIN SODIUM 5000 [USP'U]/.5ML
5000 INJECTION, SOLUTION INTRAVENOUS; SUBCUTANEOUS EVERY 12 HOURS
Status: DISCONTINUED | OUTPATIENT
Start: 2020-07-06 | End: 2020-07-09

## 2020-07-06 RX ORDER — HYDROCODONE BITARTRATE AND ACETAMINOPHEN 5; 325 MG/1; MG/1
1-2 TABLET ORAL EVERY 4 HOURS PRN
Status: DISCONTINUED | OUTPATIENT
Start: 2020-07-06 | End: 2020-07-15 | Stop reason: HOSPADM

## 2020-07-06 RX ORDER — NALOXONE HYDROCHLORIDE 0.4 MG/ML
.1-.4 INJECTION, SOLUTION INTRAMUSCULAR; INTRAVENOUS; SUBCUTANEOUS
Status: DISCONTINUED | OUTPATIENT
Start: 2020-07-06 | End: 2020-07-15 | Stop reason: HOSPADM

## 2020-07-06 RX ORDER — ONDANSETRON 2 MG/ML
4 INJECTION INTRAMUSCULAR; INTRAVENOUS EVERY 6 HOURS PRN
Status: DISCONTINUED | OUTPATIENT
Start: 2020-07-06 | End: 2020-07-15 | Stop reason: HOSPADM

## 2020-07-06 RX ORDER — SODIUM CHLORIDE, SODIUM LACTATE, POTASSIUM CHLORIDE, CALCIUM CHLORIDE 600; 310; 30; 20 MG/100ML; MG/100ML; MG/100ML; MG/100ML
INJECTION, SOLUTION INTRAVENOUS CONTINUOUS
Status: ACTIVE | OUTPATIENT
Start: 2020-07-06 | End: 2020-07-07

## 2020-07-06 RX ORDER — ACETAMINOPHEN 325 MG/1
650 TABLET ORAL EVERY 4 HOURS PRN
Status: DISCONTINUED | OUTPATIENT
Start: 2020-07-06 | End: 2020-07-15 | Stop reason: HOSPADM

## 2020-07-06 RX ORDER — ONDANSETRON 4 MG/1
4 TABLET, ORALLY DISINTEGRATING ORAL EVERY 6 HOURS PRN
Status: DISCONTINUED | OUTPATIENT
Start: 2020-07-06 | End: 2020-07-15 | Stop reason: HOSPADM

## 2020-07-06 RX ORDER — SILVER SULFADIAZINE 10 MG/G
CREAM TOPICAL 2 TIMES DAILY PRN
COMMUNITY
End: 2020-07-20

## 2020-07-06 RX ORDER — SODIUM CHLORIDE 9 MG/ML
1000 INJECTION, SOLUTION INTRAVENOUS CONTINUOUS
Status: DISCONTINUED | OUTPATIENT
Start: 2020-07-06 | End: 2020-07-06

## 2020-07-06 RX ORDER — LIDOCAINE 40 MG/G
CREAM TOPICAL
Status: DISCONTINUED | OUTPATIENT
Start: 2020-07-06 | End: 2020-07-15 | Stop reason: HOSPADM

## 2020-07-06 RX ORDER — POLYETHYLENE GLYCOL 3350 17 G/17G
17 POWDER, FOR SOLUTION ORAL DAILY PRN
Status: DISCONTINUED | OUTPATIENT
Start: 2020-07-06 | End: 2020-07-15 | Stop reason: HOSPADM

## 2020-07-06 RX ADMIN — MICONAZOLE NITRATE: 20 POWDER TOPICAL at 22:12

## 2020-07-06 RX ADMIN — TAZOBACTAM SODIUM AND PIPERACILLIN SODIUM 4.5 G: 500; 4 INJECTION, SOLUTION INTRAVENOUS at 14:37

## 2020-07-06 RX ADMIN — ACETAMINOPHEN 650 MG: 325 TABLET, FILM COATED ORAL at 22:12

## 2020-07-06 RX ADMIN — HEPARIN SODIUM 5000 UNITS: 10000 INJECTION, SOLUTION INTRAVENOUS; SUBCUTANEOUS at 21:15

## 2020-07-06 RX ADMIN — CLINDAMYCIN IN 5 PERCENT DEXTROSE 900 MG: 18 INJECTION, SOLUTION INTRAVENOUS at 21:16

## 2020-07-06 RX ADMIN — SODIUM CHLORIDE 1000 ML: 9 INJECTION, SOLUTION INTRAVENOUS at 16:05

## 2020-07-06 RX ADMIN — VANCOMYCIN HYDROCHLORIDE 2500 MG: 1 INJECTION, POWDER, LYOPHILIZED, FOR SOLUTION INTRAVENOUS at 15:10

## 2020-07-06 RX ADMIN — SODIUM CHLORIDE 2000 ML: 9 INJECTION, SOLUTION INTRAVENOUS at 15:10

## 2020-07-06 RX ADMIN — CEFAZOLIN SODIUM 1 G: 1 INJECTION, SOLUTION INTRAVENOUS at 21:16

## 2020-07-06 RX ADMIN — SODIUM CHLORIDE 500 ML: 9 INJECTION, SOLUTION INTRAVENOUS at 17:47

## 2020-07-06 RX ADMIN — SODIUM CHLORIDE, POTASSIUM CHLORIDE, SODIUM LACTATE AND CALCIUM CHLORIDE: 600; 310; 30; 20 INJECTION, SOLUTION INTRAVENOUS at 20:12

## 2020-07-06 ASSESSMENT — MIFFLIN-ST. JEOR
SCORE: 3025.52
SCORE: 3002.84

## 2020-07-06 ASSESSMENT — ENCOUNTER SYMPTOMS
HEADACHES: 1
FEVER: 0
APPETITE CHANGE: 1
ARTHRALGIAS: 1
VOMITING: 1
NECK PAIN: 1

## 2020-07-06 ASSESSMENT — ACTIVITIES OF DAILY LIVING (ADL): ADLS_ACUITY_SCORE: 13

## 2020-07-06 NOTE — ED PROVIDER NOTES
History     Chief Complaint:  Leg Swelling      HPI   Da Mustafa is a 59 year old male with a history of hypertension who presents with left leg pain and swelling. He has been seen for lymphedema of his legs several times over the past 5-7 years. However, on Tuesday, he noticed that his swelling in the left leg worsened and he was seen for a virtual visit earlier today. He also reports that he developed generalized body aches after finishing mowing his lawn that night, which is unusual. Additionally, he noticed that his rash was spreading above the knee and that there was an increase in discharge that was clear-yellow in color. He also reports that he developed a headache which encompasses his entire head, and that his neck is  sore. He has been able to manage PO intake, although he vomited once 2 days ago. He also reports numbness in his fingers and an overall change in taste and appetite. He has had some decreased bowel movements, but no urinary changes. He has had no change to his right leg. He denies recent falls, and has no history of heart disease/failure, PE/DVT, and has not traveled recently. Of note, he was seen 3 days ago for a suspected COVID-19 infection.    Allergies:  No known drug allergies    Medications:    Flonase  Lasix  Zestril  Claritin    Past Medical History:    Obesity  Edema  Hypertension  Hyperlipidemia  Allergic rhinitis  Sleep apnea  Arthritis of left food  Lymphedema  Peripheral edema  Venous stasis dermatitis of lower extremities     Past Surgical History:    Nonspecific procedure    Family History:    Diabetes mellitus  Cancer     Social History:  Smoking status: never  Alcohol use: no  Marital Status:   [2]    Review of Systems   Constitutional: Positive for appetite change. Negative for fever.   Cardiovascular: Positive for leg swelling.   Gastrointestinal: Positive for vomiting.   Musculoskeletal: Positive for arthralgias and neck pain.   Skin: Positive for rash.  "  Neurological: Positive for headaches.   All other systems reviewed and are negative.      Physical Exam     Patient Vitals for the past 24 hrs:   BP Temp Temp src Pulse Heart Rate Resp SpO2 Height Weight   07/06/20 1917 (!) 120/38 99  F (37.2  C) Oral -- 70 20 98 % 1.943 m (6' 4.5\") (!) 209.1 kg (461 lb)   07/06/20 1850 100/48 -- -- 78 -- -- 91 % -- --   07/06/20 1845 91/49 -- -- 78 -- -- 98 % -- --   07/06/20 1840 102/42 -- -- 80 77 -- 98 % -- --   07/06/20 1835 100/71 -- -- 80 75 -- 93 % -- --   07/06/20 1830 94/44 -- -- 84 83 -- 100 % -- --   07/06/20 1828 -- -- -- -- 76 -- 99 % -- --   07/06/20 1825 97/58 -- -- 85 84 -- -- -- --   07/06/20 1820 114/53 -- -- 78 85 -- -- -- --   07/06/20 1815 -- -- -- -- 78 -- 100 % -- --   07/06/20 1810 104/43 -- -- 75 79 -- 100 % -- --   07/06/20 1805 102/58 -- -- 77 74 -- 100 % -- --   07/06/20 1800 104/69 -- -- 77 77 -- -- -- --   07/06/20 1755 96/47 -- -- 76 75 -- -- -- --   07/06/20 1750 (!) 102/90 -- -- 81 80 -- 99 % -- --   07/06/20 1745 93/70 -- -- 81 97 -- 99 % -- --   07/06/20 1740 114/67 -- -- 83 80 -- 98 % -- --   07/06/20 1735 (!) 85/56 -- -- 79 76 -- (!) 72 % -- --   07/06/20 1730 97/43 -- -- 76 77 -- 94 % -- --   07/06/20 1725 (!) 98/38 -- -- 81 75 -- 100 % -- --   07/06/20 1720 94/53 -- -- 86 76 -- 100 % -- --   07/06/20 1715 102/43 -- -- 79 80 -- 99 % -- --   07/06/20 1710 111/47 -- -- 77 74 -- 100 % -- --   07/06/20 1705 108/45 -- -- 84 79 -- (!) 83 % -- --   07/06/20 1700 -- -- -- -- 75 -- -- -- --   07/06/20 1655 94/40 -- -- 75 75 -- -- -- --   07/06/20 1650 (!) 105/39 -- -- 81 79 -- -- -- --   07/06/20 1645 102/46 -- -- 78 77 -- -- -- --   07/06/20 1640 92/50 -- -- 77 73 -- -- -- --   07/06/20 1635 -- -- -- -- 77 -- -- -- --   07/06/20 1630 (!) 91/39 -- -- 74 74 -- -- -- --   07/06/20 1625 -- -- -- -- 81 -- -- -- --   07/06/20 1620 100/47 -- -- -- 79 -- -- -- --   07/06/20 1615 -- -- -- 80 82 -- -- -- --   07/06/20 1610 (!) 96/38 -- -- -- 77 -- -- -- -- " "  07/06/20 1605 -- -- -- 80 77 -- -- -- --   07/06/20 1600 98/47 -- -- -- 75 -- -- -- --   07/06/20 1555 98/47 -- -- 79 84 -- -- -- --   07/06/20 1550 91/54 -- -- -- -- -- -- -- --   07/06/20 1545 91/53 -- -- 85 -- -- -- -- --   07/06/20 1505 -- -- -- -- 72 -- -- -- --   07/06/20 1500 110/58 -- -- 71 74 -- -- -- --   07/06/20 1455 -- -- -- -- 78 -- -- -- --   07/06/20 1450 -- -- -- -- 84 -- -- -- --   07/06/20 1445 104/51 -- -- 74 78 -- -- -- --   07/06/20 1440 94/58 -- -- 81 79 -- -- -- --   07/06/20 1436 -- -- -- -- -- -- -- 1.943 m (6' 4.5\") (!) 206.8 kg (456 lb)   07/06/20 1410 -- -- -- 115 -- -- -- -- --   07/06/20 1405 101/40 -- -- -- -- -- -- -- --   07/06/20 1326 (!) 151/119 98.1  F (36.7  C) Oral -- 109 18 100 % -- --       Physical Exam  General: The patient is alert, in no respiratory distress.    HENT: Mucous membranes moist.    Cardiovascular: Regular rate and rhythm. Good pulses in all four extremities. Normal capillary refill and skin turgor.     Respiratory: Lungs are clear. No nasal flaring. No retractions. No wheezing, no crackles.    Gastrointestinal: Abdomen is non tender. Obese No guarding, no rebound. No palpable hernias.     Musculoskeletal: No gross deformity.     Skin: Erythema of left lower leg and mid left thigh. 2 Ulcers on the right lower leg. Left leg is weeping with stasis dermatitis.     Neurologic: The patient is alert and oriented x3. GCS 15. No testable cranial nerve deficit. Follows commands with clear and appropriate speech. Gives appropriate answers. Good strength in all extremities. No gross neurologic deficit. Gross sensation intact. Pupils are round and reactive. No meningismus.     Lymphatic: No cervical adenopathy. Large edema.    Psychiatric: The patient is non-tearful.      Emergency Department Course     Imaging:  Radiographic findings were communicated with the patient who voiced understanding of the findings.  US Lower extremity Venous duplex Left  IMPRESSION:     1. " No evidence for deep venous thrombosis in the visualized portion of   the left lower extremity veins.   2. The left calf veins as well as the distal portion of the left   femoral vein could not be visualized related to patient body habitus  Reading per radiology    XR Chest 1 view   IMPRESSION: Negative chest  Reading per radiology    Laboratory:  Laboratory findings were communicated with the patient who voiced understanding of the findings.    CBC: WBC 21.9 (H) o/w WNL. (HGB 13.9, )   BMP: Glucose 131 (H),  (L), potassium 3.1 (L), BUN 48 (H), GFR 20 (L) o/w WNL (Creatinine: 3.18 (H))    Lactic Acid: 2.3 (H)  INR: 1.10  Nt probnp inpatient: 117  Procalcitonin: 2.34    Hepatic Panel: albumin 2.1 (L), protein total 6.4 (L) o/w WNL    Blood Culture x2: no growth after 2 hrs    Interventions:    1437 Zosyn, 4.5 mg, IV  1510 NS 2L IV Bolus  1510 Vancomycin, 2500 mg, IV  1605 NS 1L IV Bolus  1747  mL IV Bolus    Emergency Department Course:  Past medical records, nursing notes, and vitals reviewed.  1358: I performed an exam of the patient and obtained history, as documented above.     Blood drawn. This was sent to the lab for further testing, results above.    The patient was sent for a chest XR and left leg US while in the emergency department, findings above.    I was called back to the patient's room due to lower blood pressures.  I performed an ultrasound but due to the patient's body habitus I could not tell if the IVC was collapsing.  Patient was bolused aggressively with fluid.  I did on several occasions discuss a central line with him however his pressures would always be rechecked and come back up.  The patient received multiple fluid boluses.  Discussion was made with the admitting physician about central line as well we decided to hold him longer in the ER to evaluate him.    Findings and plan explained to the Patient who consents to admission.     1704: Discussed the patient with   Tonny, who will admit the patient to a med/surg bed for further monitoring, evaluation, and treatment.       Impression & Plan          If one the following conditions is present, a 30 mL/kg bolus is recommended as part of the 6 hour bundle (IBW can be used for BMI >30, or document refusal/contraindication):      1.   Initial hypotension  defined as 2 bps < 90 or map < 65 in the 6hrs before or 6hrs after time zero.     2.  Lactate >4.     The patient has signs of Severe Sepsis as evidenced by:    1. 2 SIRS criteria, AND  2. Suspected infection, AND   3. Organ dysfunction:  SBP <90, MAP < 65, or SBP decrease of >40 from baseline due to infection    Time severe sepsis diagnosis confirmed: 1341  07/06/20 as this was the time when SBP <90 or MAP <65 and Lactate resulted, and the level was > 2.0    3 Hour Severe Sepsis Bundle Completion:    1. Initial Lactic Acid Result:   Recent Labs   Lab Test 07/06/20  1639 07/06/20  1341   LACT 1.1 2.3*     2. Blood Cultures before Antibiotics: Yes  3. Broad Spectrum Antibiotics Administered:  yes       Anti-infectives (From admission through now)    Start     Dose/Rate Route Frequency Ordered Stop    07/06/20 1439  vancomycin (VANCOCIN) 2,500 mg in sodium chloride 0.9 % 500 mL intermittent infusion      2,500 mg  over 2 Hours Intravenous ONCE 07/06/20 1438 07/06/20 1748    07/06/20 1422  piperacillin-tazobactam (ZOSYN) intermittent infusion 4.5 g      4.5 g  over 30 Minutes Intravenous ONCE 07/06/20 1421 07/06/20 1511          4. Fluid volume administered in ED:  Full 30 mL/kg bolus given (see amount below).    BMI Readings from Last 1 Encounters:   07/06/20 55.38 kg/m      30 mL/kg fluids based on weight: 6,270 mL  30 mL/kg fluids based on IBW (must be >= 60 inches tall): 2,640 mL                Severe Sepsis reassessment:  1. Repeat Lactic Acid Level: 1.1             Medical Decision Making:    Da Mustafa is a 59 year old male who presents to the emergency department  today for evaluation of fevers and leg swelling.  The patient has a longstanding history of lymphedema.  However his left leg started to weep a lot more and I think this likely is a source of entry for bacteria.  I did consider DVT the ultrasound was limited but they did not notice a DVT limited by his body habitus.  When the patient's blood pressure did decrease I considered could be due to insensible fluid loss from the leg however he was aggressively bolus started on antibiotics.  I did consider pressors on him as well as a central line was discussed with the admitting physician not to use his map was on the lower end but he was perfusing well improving and his lactic acid was coming down.  The patient was aggressively treated here he did have signs of acute renal insufficiency and I felt he was simply dehydrated.  The patient was admitted in good condition.    Covid-19  Da Mustafa was evaluated during a global COVID-19 pandemic, which necessitated consideration that the patient might be at risk for infection with the SARS-CoV-2 virus that causes COVID-19.   Applicable protocols for evaluation were followed during the patient's care.   COVID-19 was considered as part of the patient's evaluation. The plan for testing is:  a test was obtained during this visit.    Critical Care time:  was 75 minutes for this patient excluding procedures.    Diagnosis:    ICD-10-CM    1. Sepsis with acute renal failure, due to unspecified organism, unspecified acute renal failure type, unspecified whether septic shock present (H)  A41.9 Blood culture    R65.20 Blood culture    N17.9 Hepatic panel     Basic metabolic panel (BMP)     CANCELED: Asymptomatic COVID-19 Virus (Coronavirus) by PCR     CANCELED: Asymptomatic COVID-19 Virus (Coronavirus) by PCR   2. Cellulitis of left lower extremity  L03.116        Disposition:  Admitted to med/surg    Scribe Disclosure:  Stephanie LATHAM, am serving as a scribe at 1:59 PM on  7/6/2020 to document services personally performed by Miguel Álvarez MD based on my observations and the provider's statements to me.     Stephanie Keith  7/6/2020   [facility] EMERGENCY DEPARTMENT       Miguel Álvarez MD  07/06/20 2042

## 2020-07-06 NOTE — ED TRIAGE NOTES
"Pt arrives with leg swelling, lymphedema. States has had red rash on L leg that has been worse, with \"a lot of fluid seepage from leg\". Pt also states since Tues has had headache, muscle aches, and fever as high as 102.1. ABCs intact.   "

## 2020-07-06 NOTE — TELEPHONE ENCOUNTER
Monico- see [employer] message below.  Had virtual visit 7/3/20 with Dr. Castro.  Please advise.  Patrica Seo RN

## 2020-07-06 NOTE — ED NOTES
Patient has had two consecutive low BP readings. Second IV was started and IV fluids where attached to pressure bag. MD made aware.

## 2020-07-06 NOTE — TELEPHONE ENCOUNTER
If concerns for cellulitis and fever as high as 102, would recommend ER evaluation for stat labs and possible start on IV abx with transition to oral. Fever with skin infection is concerning for blood infection.     Unclear why patient has yet to be contact for covid. Can we look into this? However, if at ER should be able to be tested there.     -aly ch, pac

## 2020-07-06 NOTE — ED NOTES
[facility]  ED Nurse Handoff Report    Chief Complaint:  Leg Swelling       HPI   Da Mustafa is a 59 year old male with a history of hypertension who presents with left leg pain and swelling. He has been seen for lymphedema of his legs several times over the past 5-7 years. However, on Tuesday, he noticed that his swelling in the left leg worsened and he was seen for a virtual visit earlier today. He also reports that he developed generalized body aches after finishing mowing his lawn that night, which is unusual. Additionally, he noticed that his rash was spreading above the knee and that there was an increase in discharge that was clear-yellow in color. He also reports that he developed a headache which encompasses his entire head, and that his neck is  sore. He has been able to manage PO intake, although he vomited once 2 days ago. He also reports numbness in his fingers and an overall change in taste and appetite. He has had some decreased bowel movements, but no urinary changes. He has had no change to his right leg. He denies recent falls, and has no history of heart disease/failure, PE/DVT, and has not traveled recently. Of note, he was seen 3 days ago for a suspected COVID-19 infection.    ED Chief complaint: Leg Swelling  ED Diagnosis:   Final diagnoses:   Sepsis with acute renal failure, due to unspecified organism, unspecified acute renal failure type, unspecified whether septic shock present (H)   Cellulitis of left lower extremity     Allergies:   Allergies   Allergen Reactions     No Known Drug Allergies      Seasonal Allergies        Code Status: Full Code  Activity level - Baseline/Home:  Independent Activity Level - Current:   Assist X 2. Lift room needed: No. Bariatric: Yes   Needed: No   Isolation: YES Infection: Rule out COVID    Vital Signs:   Vitals:    07/06/20 1735 07/06/20 1740 07/06/20 1745 07/06/20 1750   BP: (!) 85/56 114/67 93/70    Pulse: 79 83 81     Resp:       Temp:       TempSrc:       SpO2:  98% 99% 99%   Weight:       Height:           Cardiac Rhythm:    NSR    Pain level:    Patient confused: No. Patient Falls Risk: Yes.   Elimination Status: Has voided   Patient Report - Initial Complaint: Leg Swelling  Focused Assessment:    14:56 Skin Color/Condition Skin - Skin Comment: Weeping from bilateral legs. Legs are red, swollen, shinny and waxen in apperance.        Tests Performed: Blood Work, Ultrasound.  Abnormal Results:   Labs Ordered and Resulted from Time of ED Arrival Up to the Time of Departure from the ED   CBC WITH PLATELETS DIFFERENTIAL - Abnormal; Notable for the following components:       Result Value    WBC 21.9 (*)     Absolute Neutrophil 17.7 (*)     Absolute Monocytes 1.5 (*)     Abs Immature Granulocytes 1.0 (*)     All other components within normal limits   BASIC METABOLIC PANEL - Abnormal; Notable for the following components:    Sodium 131 (*)     Potassium 3.1 (*)     Glucose 131 (*)     Urea Nitrogen 48 (*)     Creatinine 3.18 (*)     GFR Estimate 20 (*)     GFR Estimate If Black 24 (*)     All other components within normal limits   LACTIC ACID WHOLE BLOOD - Abnormal; Notable for the following components:    Lactic Acid 2.3 (*)     All other components within normal limits   HEPATIC PANEL - Abnormal; Notable for the following components:    Albumin 2.1 (*)     Protein Total 6.4 (*)     All other components within normal limits   BASIC METABOLIC PANEL - Abnormal; Notable for the following components:    Glucose 139 (*)     Urea Nitrogen 48 (*)     Creatinine 3.04 (*)     GFR Estimate 22 (*)     GFR Estimate If Black 25 (*)     Calcium 7.8 (*)     All other components within normal limits   COVID-19 VIRUS (CORONAVIRUS) BY PCR   INR   NT PROBNP INPATIENT   PROCALCITONIN   LACTIC ACID WHOLE BLOOD   COVID-19 VIRUS (CORONAVIRUS) BY PCR   PULSE OXIMETRY NURSING   CARDIAC CONTINUOUS MONITORING   STRICT INTAKE AND OUTPUT   PERIPHERAL IV  CATHETER   ISTAT CG4 GASES LACTATE JANNETTE NURSING POCT   IV ACCESS   BLOOD CULTURE   BLOOD CULTURE     US Lower Extremity Venous Duplex Left   Final Result   IMPRESSION:     1. No evidence for deep venous thrombosis in the visualized portion of   the left lower extremity veins.    2. The left calf veins as well as the distal portion of the left   femoral vein could not be visualized related to patient body habitus.      MARK ANTHONY CHOW MD      XR Chest 1 View   Final Result   IMPRESSION: Negative chest.      AURELIA MELCHOR MD          Treatments provided: NS Bolus, Vancomycin, Zosyn   Family Comments: No family at bedside.   OBS brochure/video discussed/provided to patient:  N/A  ED Medications:   Medications   sodium chloride (PF) 0.9% PF flush 3 mL (has no administration in time range)   sodium chloride (PF) 0.9% PF flush 3 mL (has no administration in time range)   0.9% sodium chloride BOLUS (0 mLs Intravenous Stopped 7/6/20 1748)     Followed by   sodium chloride 0.9% infusion (has no administration in time range)   0.9% sodium chloride BOLUS (500 mLs Intravenous New Bag 7/6/20 1747)   piperacillin-tazobactam (ZOSYN) intermittent infusion 4.5 g (0 g Intravenous Stopped 7/6/20 1511)   0.9% sodium chloride BOLUS (0 mLs Intravenous Stopped 7/6/20 1619)   vancomycin (VANCOCIN) 2,500 mg in sodium chloride 0.9 % 500 mL intermittent infusion (0 mg Intravenous Stopped 7/6/20 1748)     Drips infusing:  Yes-NS Bolus  For the majority of the shift, the patient's behavior Green. Interventions performed were N/A.    Sepsis treatment initiated: No       ED Nurse Name/Phone Number: Vonda Alonso RN,   6:02 PM    RECEIVING UNIT ED HANDOFF REVIEW    Above ED Nurse Handoff Report was reviewed: Yes  Reviewed by: Marsha Sung RN on July 6, 2020 at 6:49 PM

## 2020-07-06 NOTE — H&P
[facility]    History and Physical - Hospitalist Service       Date of Admission:  7/6/2020     Assessment & Plan   Da Mustafa is a 59 year old male with a history of lymphedema, hypertension and morbid obesity who is admitted to the hospitalist service for severe sepsis secondary to left lower extremity cellulitis in the setting of chronic lymphedema.    Patient presented to the ED with approximately 1 week of vague symptoms but also with worsening left lower extremity redness and swelling.  In the ED, found to be borderline hypotensive with an elevated lactate and WBC count, consistent with severe sepsis secondary to cellulitis.  Also noted to have an OMAR with a creatinine to 3.19 up from a normal baseline.  Started on broad-spectrum antibiotics in the ED.    Severe sepsis secondary to left lower extremity cellulitis  - Patient met criteria for severe sepsis with OMAR and lactic acidosis.  The left lower extremity is obviously infected.  DVT was excluded.  There is no focal indurated area concerning for abscess and no purulence.  - Patient received Vancomycin and Zosyn in the ED. Switch to cefazolin as the appearance is that of streptococcal cellulitis. Add clindamycin for toxin suppression.  - Do not think this represents necrotizing fasciitis right now, no pain out of proportion, bullae are probably more related to chronic lymphedema, no clear necrosis, but low threshold to get CT scan of the leg  - Follow blood cultures drawn in the ED.  - Do not see a need for ID consult at this point.  - BPs were a little low with MAPs in the 60 range but ultimately improved and he is OK for the general medical floor  - LR @ 100 ml/hr until tomorrow morning    OMAR  - Patient's baseline creatinine is 0.9 and was 3.18 on initial presentation here  - Most likely cause is prerenal in setting of sepsis. Patient also kept taking his lisinopril and lasix. Check UA to look for casts, could be ATN, will need  to see how he does.  - Monitor daily, avoid nephrotoxins as able.  - Hold lisinopril and lasix    Lower extremity lymphedema  - Does lymphedema wraps at home  - Lymphedema management consult placed. Wound nurse consulted as well.    Morbid obesity  - BMI 54. Complicates cares.    COVID 19 status  Negative.      Diet: Regular  DVT Prophylaxis: Heparin SQ  Vargas Catheter: No  Code Status: Full Code    Disposition Plan   Expected discharge:   At least two nights in the hospital to monitor kidney function and response to IV antibiotics.    Sandeep Lancaster MD  [facility]    ______________________________________________________________________    Chief Complaint   Leg Pain and swelling    History of Present Illness   Da Mustafa is a 59 year old male with a history of lymphedema, hypertension and morbid obesity who presented to the ED complaining of left leg pain and swelling.    Patient reports that he last felt well about 1 week ago.  Over this time, he has had multiple symptoms, occluding body aches, sense of malaise and fevers as high as 102.  He chronically does lymphedema wraps at home and has continued to do that, but noticed that his left leg was becoming a bit more swollen than usual.  He developed a rash on the left lower leg, which progressed proximally and with the increasing fevers, he decided to present to the ED.  Vital signs in the ED were notable for initially normal blood pressure which later trended down a little bit lower but stabilized.  He has had no fever in the ED.  Labs notable for a creatinine of 3.18 up from a normal baseline, lactate of 2.3, and a white count of 21.9.  Lower extremity duplex of the left leg was done which did not show any evidence for DVT.  He was given vancomycin and Zosyn.  After 3 L fluid boluses, his blood pressure, which had been as low as mean arterial pressures in the 60 range, improved to normal range.    On interview, the patient states he  "feels basically okay at this time.  He corroborates the history above.  He says he has been feeling a little short of breath and overall crummy but has not had any significant cough.  He has no known COVID-19 contacts.  He has been working from home.    Review of Systems    The 10 point Review of Systems is negative other than noted in the HPI or here.     Physical Exam   BP 93/70   Pulse 81   Temp 98.1  F (36.7  C) (Oral)   Resp 18   Ht 1.943 m (6' 4.5\")   Wt (!) 206.8 kg (456 lb)   SpO2 99%   BMI 54.78 kg/m         General: Sitting up in the bed, not in any acute distress.    HEENT: No scleral icterus. Oropharynx moist.     Neck: Supple.    Pulmonary: Normal work of breathing. Clear to auscultation bilaterally.    Cardiovascular: Regular rate and rhythm without murmur or extra heart sounds.    Abdomen: Soft and non-tender.    Extremities: Massive bilateral lower extremity edema.    Neurologic: Awake, alert, appropriate.    Skin: Warm and dry.  The entire left leg is red and warm to the touch.  There are areas of bullae that are weeping.  See image below.    Psychiatric: Normal affect and mood.             Data   Data reviewed today: I have reviewed all labs and imaging results.    Recent Labs   Lab 07/06/20  1742 07/06/20  1639 07/06/20  1341   WBC  --   --  21.9*   HGB  --   --  13.9   MCV  --   --  93   PLT  --   --  317   INR  --   --  1.10     --  131*   POTASSIUM 3.4  --  3.1*   CHLORIDE 99  --  96   CO2 27  --  25   BUN 48*  --  48*   CR 3.04*  --  3.18*   ANIONGAP 8  --  10   ZEINAB 7.8*  --  8.7   *  --  131*   ALBUMIN  --  2.1*  --    PROTTOTAL  --  6.4*  --    BILITOTAL  --  0.4  --    ALKPHOS  --  101  --    ALT  --  18  --    AST  --  12  --      Recent Results (from the past 24 hour(s))   XR Chest 1 View    Narrative    XR CHEST 1 VW  7/6/2020 3:33 PM       INDICATION: Fever  COMPARISON: None       Impression    IMPRESSION: Negative chest.    AURELIA MELCHOR MD   US Lower Extremity " Venous Duplex Left    Narrative    ULTRASOUND LOWER EXTREMITY VENOUS DUPLEX LEFT   7/6/2020 3:35 PM     HISTORY: Left leg swelling.    COMPARISON: None.    TECHNIQUE: Spectral doppler and waveform analysis were performed on  the left lower extremity veins.    FINDINGS: Exam was limited related to patient body habitus. There was  nonvisualization of the left posterior tibial and peroneal veins.  Portions of the distal femoral vein could also not be visualized. The  left common femoral, femoral, and popliteal veins are patent,  compressible, and negative for deep venous thrombosis where seen.      Impression    IMPRESSION:    1. No evidence for deep venous thrombosis in the visualized portion of  the left lower extremity veins.   2. The left calf veins as well as the distal portion of the left  femoral vein could not be visualized related to patient body habitus.    MARK ANTHONY CHOW MD       Past Medical History    I have reviewed this patient's medical history and updated it with pertinent information if needed.   Past Medical History:   Diagnosis Date     Class 3 obesity due to excess calories without serious comorbidity with body mass index (BMI) of 50.0 to 59.9 in adult 12/27/2017     Edema      Essential hypertension with goal blood pressure less than 140/90 7/25/2016     Essential hypertension, benign      Hyperlipidemia LDL goal <160 7/28/2015     Hypertension goal BP (blood pressure) < 140/90 4/25/2013     Obesity, unspecified      Seasonal allergic rhinitis 5/19/2014     Sleep apnea         Past Surgical History    I have reviewed this patient's surgical history and updated it with pertinent information if needed.  Past Surgical History:   Procedure Laterality Date     C NONSPECIFIC PROCEDURE       COLONOSCOPY  12/4/2013    Procedure: COLONOSCOPY;  Colonoscopy  ;  Surgeon: Beny Rich MD;  Location:  GI        Social History    I have reviewed this patient's social history and updated it with  pertinent information if needed.  Social History     Tobacco Use     Smoking status: Never Smoker     Smokeless tobacco: Never Used   Substance Use Topics     Alcohol use: Yes     Comment: rare     Drug use: No          Family History    I have reviewed this patient's family history and updated it with pertinent information if needed.   Family History   Problem Relation Age of Onset     Cancer Father           Prior to Admission Medications    Prior to Admission Medications   Prescriptions Last Dose Informant Patient Reported? Taking?   fluticasone (FLONASE) 50 MCG/ACT nasal spray   No No   Sig: SPRAY 2 SPRAYS INTO BOTH NOSTRILS DAILY   furosemide (LASIX) 40 MG tablet   No No   Sig: Take 1 tablet (40 mg) by mouth daily   garlic 150 MG TABS tablet   Yes No   Sig: Take 150 mg by mouth daily   glucosamine-chondroitin 500-400 MG CAPS per capsule   Yes No   Sig: Take 1 capsule by mouth daily   lisinopril (ZESTRIL) 40 MG tablet   No No   Sig: Take 1 tablet (40 mg) by mouth daily   loratadine (CLARITIN) 10 MG tablet   Yes No   Sig: Take 10 mg by mouth daily   multivitamin w/minerals (MULTI-VITAMIN) tablet   Yes No   Sig: Take 1 tablet by mouth daily   omega 3 1000 MG CAPS   Yes No   order for DME   No No   Sig: BLE knee high custom compression stockings 20-30 or 30-40 mm Hg compression stockings   silver sulfADIAZINE (SILVADENE) 1 % external cream   No No   Sig: Apply topically 2 times daily      Facility-Administered Medications: None        Allergies    Allergies   Allergen Reactions     No Known Drug Allergies      Seasonal Allergies

## 2020-07-06 NOTE — TELEPHONE ENCOUNTER
"Da Mustafa is a 59 year old male whose wife calls with complaints of worsening lymphedema.    NURSING ASSESSMENT:  Description:  Lymphedema is \"RAGING\"-usually just in his lower legs, now moving upwards to his thighs causing \"BIG\" blisters-lots of drainage.  Fever off/on with highest at 102.  Horrible headaches and muscle aches.  Onset/duration:  X 4 days  Precip. factors:  History of Lymphedema  Associated symptoms:  See above  Improves/worsens symptoms:  nothing  Pain scale (0-10)   8/10  Last exam/Treatment:  VIRTUAL VISIT 7/3/2020  Allergies:   Allergies   Allergen Reactions     No Known Drug Allergies      Seasonal Allergies        MEDICATIONS:   Taking medication(s) as prescribed? Yes  Taking over the counter medication(s?) No  Any medication side effects? No significant side effects    Any barriers to taking medication(s) as prescribed?  No  Medication(s) improving/managing symptoms?  No  Medication reconciliation completed: Yes      NURSING PLAN: Nursing advice to patient Patient needs to be evaluated in the ED.  Patients wife verbalizes understanding and will take patient to [facility].    RECOMMENDED DISPOSITION:  To ED, another person to drive - Wife will drive patient.  Will comply with recommendation: Yes  If further questions/concerns or if symptoms do not improve, worsen or new symptoms develop, call your PCP or [facility] as soon as possible.      Guideline used:  Telephone Triage Protocols for Nurses, Fourth Edition, Dawna Garcia RN  "

## 2020-07-06 NOTE — TELEPHONE ENCOUNTER
left message for call back on cell (covid order placed late Friday afternoon, not sure if they call over weekend or just regular business hours), also sent INVOLTA message for pt to call clinic  Michelle Garrido RN

## 2020-07-07 LAB
ALBUMIN UR-MCNC: 30 MG/DL
ANION GAP SERPL CALCULATED.3IONS-SCNC: 6 MMOL/L (ref 3–14)
ANION GAP SERPL CALCULATED.3IONS-SCNC: 7 MMOL/L (ref 3–14)
APPEARANCE UR: ABNORMAL
BILIRUB UR QL STRIP: NEGATIVE
BUN SERPL-MCNC: 49 MG/DL (ref 7–30)
BUN SERPL-MCNC: 49 MG/DL (ref 7–30)
CALCIUM SERPL-MCNC: 7.3 MG/DL (ref 8.5–10.1)
CALCIUM SERPL-MCNC: 7.7 MG/DL (ref 8.5–10.1)
CHLORIDE SERPL-SCNC: 102 MMOL/L (ref 94–109)
CHLORIDE SERPL-SCNC: 102 MMOL/L (ref 94–109)
CO2 SERPL-SCNC: 24 MMOL/L (ref 20–32)
CO2 SERPL-SCNC: 26 MMOL/L (ref 20–32)
COLOR UR AUTO: YELLOW
CREAT SERPL-MCNC: 2.05 MG/DL (ref 0.66–1.25)
CREAT SERPL-MCNC: 2.26 MG/DL (ref 0.66–1.25)
CRP SERPL-MCNC: 181 MG/L (ref 0–8)
ERYTHROCYTE [DISTWIDTH] IN BLOOD BY AUTOMATED COUNT: 12.7 % (ref 10–15)
ERYTHROCYTE [DISTWIDTH] IN BLOOD BY AUTOMATED COUNT: 12.8 % (ref 10–15)
GFR SERPL CREATININE-BSD FRML MDRD: 31 ML/MIN/{1.73_M2}
GFR SERPL CREATININE-BSD FRML MDRD: 35 ML/MIN/{1.73_M2}
GLUCOSE BLDC GLUCOMTR-MCNC: 101 MG/DL (ref 70–99)
GLUCOSE BLDC GLUCOMTR-MCNC: 114 MG/DL (ref 70–99)
GLUCOSE BLDC GLUCOMTR-MCNC: 116 MG/DL (ref 70–99)
GLUCOSE BLDC GLUCOMTR-MCNC: 120 MG/DL (ref 70–99)
GLUCOSE SERPL-MCNC: 108 MG/DL (ref 70–99)
GLUCOSE SERPL-MCNC: 133 MG/DL (ref 70–99)
GLUCOSE UR STRIP-MCNC: NEGATIVE MG/DL
GRAM STN SPEC: ABNORMAL
GRAM STN SPEC: NORMAL
GRAM STN SPEC: NORMAL
HBA1C MFR BLD: 5.5 % (ref 0–5.6)
HCT VFR BLD AUTO: 33.3 % (ref 40–53)
HCT VFR BLD AUTO: 34.8 % (ref 40–53)
HGB BLD-MCNC: 11.1 G/DL (ref 13.3–17.7)
HGB BLD-MCNC: 11.3 G/DL (ref 13.3–17.7)
HGB UR QL STRIP: NEGATIVE
HYALINE CASTS #/AREA URNS LPF: 1 /LPF (ref 0–2)
INTERPRETATION ECG - MUSE: NORMAL
KETONES UR STRIP-MCNC: NEGATIVE MG/DL
LACTATE BLD-SCNC: 1 MMOL/L (ref 0.7–2)
LACTATE BLD-SCNC: 1.6 MMOL/L (ref 0.7–2)
LEUKOCYTE ESTERASE UR QL STRIP: NEGATIVE
Lab: ABNORMAL
Lab: NORMAL
MCH RBC QN AUTO: 30.1 PG (ref 26.5–33)
MCH RBC QN AUTO: 30.2 PG (ref 26.5–33)
MCHC RBC AUTO-ENTMCNC: 32.5 G/DL (ref 31.5–36.5)
MCHC RBC AUTO-ENTMCNC: 33.3 G/DL (ref 31.5–36.5)
MCV RBC AUTO: 91 FL (ref 78–100)
MCV RBC AUTO: 93 FL (ref 78–100)
NITRATE UR QL: NEGATIVE
PH UR STRIP: 5 PH (ref 5–7)
PLATELET # BLD AUTO: 242 10E9/L (ref 150–450)
PLATELET # BLD AUTO: 263 10E9/L (ref 150–450)
POTASSIUM SERPL-SCNC: 3.2 MMOL/L (ref 3.4–5.3)
POTASSIUM SERPL-SCNC: 3.4 MMOL/L (ref 3.4–5.3)
RBC # BLD AUTO: 3.68 10E12/L (ref 4.4–5.9)
RBC # BLD AUTO: 3.76 10E12/L (ref 4.4–5.9)
RBC #/AREA URNS AUTO: 2 /HPF (ref 0–2)
SODIUM SERPL-SCNC: 133 MMOL/L (ref 133–144)
SODIUM SERPL-SCNC: 134 MMOL/L (ref 133–144)
SOURCE: ABNORMAL
SP GR UR STRIP: 1.02 (ref 1–1.03)
SPECIMEN SOURCE: ABNORMAL
SPECIMEN SOURCE: NORMAL
SQUAMOUS #/AREA URNS AUTO: 1 /HPF (ref 0–1)
UROBILINOGEN UR STRIP-MCNC: NORMAL MG/DL (ref 0–2)
WBC # BLD AUTO: 15.2 10E9/L (ref 4–11)
WBC # BLD AUTO: 15.5 10E9/L (ref 4–11)
WBC #/AREA URNS AUTO: 6 /HPF (ref 0–5)

## 2020-07-07 PROCEDURE — 25000125 ZZHC RX 250: Performed by: INTERNAL MEDICINE

## 2020-07-07 PROCEDURE — 25800030 ZZH RX IP 258 OP 636: Performed by: INTERNAL MEDICINE

## 2020-07-07 PROCEDURE — 36415 COLL VENOUS BLD VENIPUNCTURE: CPT | Performed by: INTERNAL MEDICINE

## 2020-07-07 PROCEDURE — 87077 CULTURE AEROBIC IDENTIFY: CPT | Performed by: INTERNAL MEDICINE

## 2020-07-07 PROCEDURE — 81001 URINALYSIS AUTO W/SCOPE: CPT | Performed by: INTERNAL MEDICINE

## 2020-07-07 PROCEDURE — 86140 C-REACTIVE PROTEIN: CPT | Performed by: INTERNAL MEDICINE

## 2020-07-07 PROCEDURE — 25000128 H RX IP 250 OP 636: Performed by: INTERNAL MEDICINE

## 2020-07-07 PROCEDURE — 87205 SMEAR GRAM STAIN: CPT | Performed by: INTERNAL MEDICINE

## 2020-07-07 PROCEDURE — 80048 BASIC METABOLIC PNL TOTAL CA: CPT | Performed by: INTERNAL MEDICINE

## 2020-07-07 PROCEDURE — 85027 COMPLETE CBC AUTOMATED: CPT | Performed by: INTERNAL MEDICINE

## 2020-07-07 PROCEDURE — 83605 ASSAY OF LACTIC ACID: CPT | Performed by: INTERNAL MEDICINE

## 2020-07-07 PROCEDURE — 00000146 ZZHCL STATISTIC GLUCOSE BY METER IP

## 2020-07-07 PROCEDURE — G0463 HOSPITAL OUTPT CLINIC VISIT: HCPCS | Mod: 25

## 2020-07-07 PROCEDURE — 87186 SC STD MICRODIL/AGAR DIL: CPT | Performed by: INTERNAL MEDICINE

## 2020-07-07 PROCEDURE — 12000000 ZZH R&B MED SURG/OB

## 2020-07-07 PROCEDURE — 99233 SBSQ HOSP IP/OBS HIGH 50: CPT | Performed by: INTERNAL MEDICINE

## 2020-07-07 PROCEDURE — 87070 CULTURE OTHR SPECIMN AEROBIC: CPT | Performed by: INTERNAL MEDICINE

## 2020-07-07 PROCEDURE — 83036 HEMOGLOBIN GLYCOSYLATED A1C: CPT | Performed by: INTERNAL MEDICINE

## 2020-07-07 PROCEDURE — 97602 WOUND(S) CARE NON-SELECTIVE: CPT

## 2020-07-07 PROCEDURE — 25000132 ZZH RX MED GY IP 250 OP 250 PS 637: Performed by: INTERNAL MEDICINE

## 2020-07-07 RX ORDER — SILVER SULFADIAZINE 10 MG/G
CREAM TOPICAL DAILY
Status: DISCONTINUED | OUTPATIENT
Start: 2020-07-07 | End: 2020-07-15 | Stop reason: HOSPADM

## 2020-07-07 RX ORDER — DEXTROSE MONOHYDRATE 25 G/50ML
25-50 INJECTION, SOLUTION INTRAVENOUS
Status: DISCONTINUED | OUTPATIENT
Start: 2020-07-07 | End: 2020-07-08

## 2020-07-07 RX ORDER — SODIUM CHLORIDE 9 MG/ML
INJECTION, SOLUTION INTRAVENOUS CONTINUOUS
Status: DISCONTINUED | OUTPATIENT
Start: 2020-07-07 | End: 2020-07-09

## 2020-07-07 RX ORDER — CEFAZOLIN SODIUM 2 G/100ML
2 INJECTION, SOLUTION INTRAVENOUS EVERY 12 HOURS
Status: DISCONTINUED | OUTPATIENT
Start: 2020-07-07 | End: 2020-07-08

## 2020-07-07 RX ORDER — NICOTINE POLACRILEX 4 MG
15-30 LOZENGE BUCCAL
Status: DISCONTINUED | OUTPATIENT
Start: 2020-07-07 | End: 2020-07-08

## 2020-07-07 RX ADMIN — ACETAMINOPHEN 650 MG: 325 TABLET, FILM COATED ORAL at 05:07

## 2020-07-07 RX ADMIN — SODIUM CHLORIDE: 9 INJECTION, SOLUTION INTRAVENOUS at 09:48

## 2020-07-07 RX ADMIN — CLINDAMYCIN IN 5 PERCENT DEXTROSE 900 MG: 18 INJECTION, SOLUTION INTRAVENOUS at 21:53

## 2020-07-07 RX ADMIN — MICONAZOLE NITRATE: 20 POWDER TOPICAL at 20:56

## 2020-07-07 RX ADMIN — CLINDAMYCIN IN 5 PERCENT DEXTROSE 900 MG: 18 INJECTION, SOLUTION INTRAVENOUS at 06:02

## 2020-07-07 RX ADMIN — CLINDAMYCIN IN 5 PERCENT DEXTROSE 900 MG: 18 INJECTION, SOLUTION INTRAVENOUS at 15:04

## 2020-07-07 RX ADMIN — CEFAZOLIN SODIUM 1 G: 1 INJECTION, SOLUTION INTRAVENOUS at 08:00

## 2020-07-07 RX ADMIN — HEPARIN SODIUM 5000 UNITS: 10000 INJECTION, SOLUTION INTRAVENOUS; SUBCUTANEOUS at 09:46

## 2020-07-07 RX ADMIN — CEFAZOLIN SODIUM 2 G: 2 INJECTION, SOLUTION INTRAVENOUS at 16:33

## 2020-07-07 RX ADMIN — SODIUM CHLORIDE 1000 ML: 9 INJECTION, SOLUTION INTRAVENOUS at 00:27

## 2020-07-07 RX ADMIN — SODIUM CHLORIDE: 9 INJECTION, SOLUTION INTRAVENOUS at 21:50

## 2020-07-07 RX ADMIN — MICONAZOLE NITRATE: 20 POWDER TOPICAL at 09:51

## 2020-07-07 RX ADMIN — SILVER SULFADIAZINE: 10 CREAM TOPICAL at 21:06

## 2020-07-07 RX ADMIN — HEPARIN SODIUM 5000 UNITS: 10000 INJECTION, SOLUTION INTRAVENOUS; SUBCUTANEOUS at 20:55

## 2020-07-07 ASSESSMENT — ACTIVITIES OF DAILY LIVING (ADL)
ADLS_ACUITY_SCORE: 15

## 2020-07-07 NOTE — CONSULTS
[facility] Nurse Inpatient Wound Assessment   Reason for consultation: BLE wound     Assessment  BLE wound due to mixed etiology: Lymphedema, suspect  PVD  Status: initial assessment of  BLE wounds,  Chronic wounds, pt describes seeing many different MDs for care but is very unclear about POC.  He states that his wife takes care of everything.    Pt reports  NOT sleeping in  Bed for over 2 years. He sleeps in a reclining chair with feet down.     Treatment Plan  BLE wound: Daily    1.wash BLE with baby shampoo and warm water: suds up the wash cloth  2. Rinse with wound  Spray  3.  Sween to intact skin and feet  4. Smear Silvadene on  ABD; apply to RLE shin wound and Left inner ankle  5. Secure with 1/2 roll of Kerlix/ tape  6. Keep BLE on blue wedge pillow and Blue chux  Open to air  7. Encourage  Pt to flex and extend feet for 5-10 every hour while awake     Orders Written  Recommended provider order: Silvadene OK'd by Dr Ashraf  [facility] Nurse follow-up plan:weekly  Nursing to notify the Provider(s) and re-consult the [facility] Nurse if wound(s) deteriorates or new skin concern.    Patient History  According to provider note(s):  59 year old male with a history of lymphedema, hypertension and morbid obesity who is admitted to the hospitalist service for severe sepsis secondary to left lower extremity cellulitis in the setting of chronic lymphedema.    Objective Data  Containment of urine/stool: Continent of bladder and Continent of bowel    Active Diet Order  Orders Placed This Encounter      Combination Diet Regular Diet Adult      Output:   I/O last 3 completed shifts:  In: 1706 [P.O.:100; I.V.:1606]  Out: -                             Labs:   Recent Labs   Lab 07/07/20  0041 07/06/20  1639 07/06/20  1341   ALBUMIN  --  2.1*  --    HGB 11.1*  --  13.9   INR  --   --  1.10   WBC 15.2*  --  21.9*   .0*  --   --        Physical Exam  Skin inspection: BLE    Wound Location:  Right  And Left  Leg  Date of last photo 7/7/20       Right anterior LE                                                        Left medial malleolus and  LLE    Wound History: see above; pt is poor historian and makes many excuses for not taking proper care of himself  Measurements (length x width x depth, in cm)   LLE: entire gator area anterior and posterior has denuded raw weeping and macerated skin/ dermis  LLE medial malleolus: 9 x 12cm weeping yellow moist epidermis, deep red eroded skin  RLE anterior: 2.5 x 1.8cm moist yellow wound within 8x8cm deep red denuded skin  Right Great toe Medial Callous with dark red spot deep within callous  Right second toe plantar tip: 1x1cm dry red scab and callous from hammer toes    Wound Base: see above   Palpation of the wound bed: boggy, fluctuance and normal to some areas   Periwound skin: edematous, hemosiderin staining, lipodermatosclerosis, macerated and superficial erosion  Periwound Temperature: normal   Drainage:, moderate  Description of drainage: yellow  Odor: mild  Pain: denies ,     Interventions  Current support surface: Standard  Atmos Air mattress  Current off-loading measures: Pillows under calves and Pillows  Visual inspection and assessment completed   Wound Care: completed by RN  Supplies: ordered: per POC and discussed with RN  Education provided: plan of care, wound progress, Infection prevention  and Off-loading pressure  Discussed plan of care with Patient, Family and Nurse    Michelle Young RN, CWON

## 2020-07-07 NOTE — PLAN OF CARE
A&O. VSS. Ax2 w/ WW/GB. BLE edema, redness, pain, and numbness. LLE worse. Wounds draining, legs elevated and chux under BLE. BG monitoring, tolerating diet, +BS/gas. Voiding. Plan is to discharge to home when medically stable. Will continue to monitor.

## 2020-07-07 NOTE — CONSULTS
ID consult dictated IMP 1 60 yo male acute severe L leg cellulitis, likely strep    REc ancef, clinda, await BC, elevate leg

## 2020-07-07 NOTE — PHARMACY-ADMISSION MEDICATION HISTORY
Admission medication history interview status for this patient is complete. See [facility] admission navigator for allergy information, prior to admission medications and immunization status.     Medication history interview done via telephone during Covid-19 pandemic, indicate source(s): Patient  Medication history resources (including written lists, pill bottles, clinic record):None    Changes made to PTA medication list:  Added: none  Deleted: none  Changed: flonase and silvadene to prn    Actions taken by pharmacist (provider contacted, etc):None     Additional medication history information:None    Medication reconciliation/reorder completed by provider prior to medication history?  n   (Y/N)           Prior to Admission medications    Medication Sig Last Dose Taking? Auth Provider   fluticasone (FLONASE) 50 MCG/ACT nasal spray Spray 1 spray into both nostrils daily as needed for rhinitis or allergies  Yes Unknown, Entered By History   furosemide (LASIX) 40 MG tablet Take 1 tablet (40 mg) by mouth daily 7/6/2020 at Unknown time Yes Robel Morris PA-C   garlic 150 MG TABS tablet Take 150 mg by mouth daily  Yes Reported, Patient   glucosamine-chondroitin 500-400 MG CAPS per capsule Take 1 capsule by mouth daily  Yes Reported, Patient   lisinopril (ZESTRIL) 40 MG tablet Take 1 tablet (40 mg) by mouth daily 7/6/2020 at am Yes Robel Morris PA-C   loratadine (CLARITIN) 10 MG tablet Take 10 mg by mouth daily 7/6/2020 at Unknown time Yes Reported, Patient   multivitamin w/minerals (MULTI-VITAMIN) tablet Take 1 tablet by mouth daily 7/6/2020 at Unknown time Yes Reported, Patient   silver sulfADIAZINE (SILVADENE) 1 % external cream Apply topically 2 times daily as needed  Yes Unknown, Entered By History

## 2020-07-07 NOTE — CONSULTS
Consult Date:  07/07/2020      INFECTIOUS DISEASE CONSULTATION      LOCATION:  Room 647, [facility].      REFERRING PHYSICIAN:  Dr. Sandeep Lancaster.      IMPRESSION:   1.  A 59-year-old male with acute sepsis, severe left leg cellulitis with underlying lymphedema, very likely strep, but multiple organisms possible in this setting.   2.  Severe venous stasis and lymphedema including recurrent bilateral leg wounds, major blistering areas currently probably related to streptococcal cellulitis.   3.  Recurrent cellulitis in the past, although no prior hospitalizations, manageable as an outpatient prior to this.   4.  Morbid obesity.   5.  Acute renal failure related to sepsis.   6.  COVID-19 rule out, but given this alternative diagnosis despite some suggestive symptoms, unlikely COVID-19.      RECOMMENDATIONS:   1.  Discontinue COVID isolation.   2.  Continue antibiotics, increase Ancef dose as renal function improving and continue clindamycin.   3.  Await blood cultures.  Significant rigors and sepsis, so quite possible bacteremic.   4.  Leg elevation as much as possible.   5.  Wound care as needed.      HISTORY OF PRESENT ILLNESS:  This 59-year-old male is seen in consultation due to acute cellulitis.  The patient has a history of major lymphedema and leg swelling for about 10 years.  The left leg has always been worse than the right.  He has had some cellulitis episodes in the past, although has not been hospitalized.  In 2018, he had a significant episode, had a leg culture done that grew 5 different organisms of dubious significance.  He has had antibiotics as recently as several months ago, but not currently, leading into this episode.  He was feeling reasonably well, then about 5 days ago started noticing a loss of taste, chills and fever symptoms, but notably no respiratory symptoms.  His concern became COVID-19.  At that point, it became apparent though the left leg was worsening.  He had an  obvious cellulitis with blistering and major pain present throughout that leg.  At presentation, he had renal failure, major leukocytosis and obvious left leg cellulitis.      PAST MEDICAL HISTORY:  The prior leg cellulitis and recurrent infections, history of major lymphedema long-term along with morbid obesity.      ALLERGIES:  NO ANTIMICROBIAL ALLERGIES.      MEDICATIONS:  As listed.      SOCIAL AND FAMILY HISTORY:  No recent travel or exposures.  No one else he has been around has been ill.      REVIEW OF SYSTEMS:  Systemic symptoms are largely improved.  Still has some fever symptoms, malaise, notable hypotension earlier.      PHYSICAL EXAMINATION:   GENERAL:  The patient appears his stated age, does not look that toxic or ill, although slightly tachycardic still.   VITAL SIGNS:  Blood pressure now improved.   HEENT:  No obvious lesions.   NECK:  Supple and nontender.   HEART AND LUNGS:  Unremarkable.  Slight tachycardia.   ABDOMEN:  Quite obese.  No major redness.   EXTREMITIES:  Right leg with some erythema, an anterior wound area that is fairly superficial, all stasis rate-related.  Left leg  - severe redness all the way to the groin, all of it compatible with streptococcal cellulitis with lymphangitis, multiple blistering areas and slight wound breakdown areas.      LABORATORY DATA:  White count elevated at 21,900.  Blood cultures so far negative at 20 hours.  Wound culture done.  Gram stain had numerous organisms of dubious significance.  Again, this is likely Streptococcus regardless of any wound culture.      Thank you very much for this consultation.  Will follow the patient with you.         JEWEL SEPULVEDA MD             D: 2020   T: 2020   MT:       Name:     RANJIT COSME   MRN:      [id number]        Account:       [id number]   :      1960           Consult Date:  2020      Document: [id number]

## 2020-07-07 NOTE — PROVIDER NOTIFICATION
Spoke to Dr. Lancaster, okay to discontinue isolation precautions and move patient.     Pt COVID negative, paged MD for isolationg to be discontinued

## 2020-07-07 NOTE — PLAN OF CARE
Pt A/O. Highest temp- 101. BP's low- MD aware. Tylenol for pain. Left LE has has copious amount of drainage. Right LE- small amount of drainage. Using lift to reposition in bed. IV infusing. Pt has not voided, bladder scanned for 97 ml. UA stills needs to be collected. Will continue to monitor

## 2020-07-07 NOTE — PROGRESS NOTES
CLINICAL NUTRITION SERVICES  -  ASSESSMENT NOTE    Recommendations Ordered by Registered Dietitian (RD):   Continue diet as ordered    Malnutrition:   % Weight Loss:  Weight loss does not meet criteria for malnutrition --> see weight section, weight trends likely influenced by fluid status   % Intake:  <75% for > 7 days (non-severe malnutrition) --> PTA, improving throughout admission   Subcutaneous Fat Loss:  Orbital region mild depletion --> will not use given only one indicator   Muscle Loss:  Temporal region moderate depletion --> will not use given only one indicator, some likely masked by adipose tissue  Fluid Retention:  Mild - Moderate (leg, knee, ankle and foot)     Malnutrition Diagnosis: Patient does not meet two of the above criteria necessary for diagnosing malnutrition     REASON FOR ASSESSMENT  Da Mustafa is a 59 year old male seen by Registered Dietitian for Admission Nutrition Risk Screen for stageable pressure injuries or large/non-healing wound or burn    NUTRITION HISTORY  - Information obtained from chart and patient   - Pt admitted for severe sepsis secondary to left lower extremity cellulitis  - History of  lymphedema, hypertension and morbid obesity  - Typical food/fluid intake PTA: Pt states that he typically consumes 2-3 meals per day, however since July 1st he has been consuming less at meals (1/2 sandwich vs sandwich and pasta), decreased appetite with infection.   - Supplements: none   - Chewing/swallowing difficulty: none   - Food allergies: NKFA    CURRENT NUTRITION ORDERS  Diet Order:     Regular Diet     Current Intake/Tolerance:  No information recorded in the flowsheets to comment on as pot recently admitted     NUTRITION FOCUSED PHYSICAL ASSESSMENT FOR DIAGNOSING MALNUTRITION)  Yes              Observed:    Subcutaneous fat loss (refer to documentation in Malnutrition section) and Fluid retention (refer to documentation in Malnutrition section)    Obtained from  "Chart/Interdisciplinary Team:  - WOC consulted   - No BM noted     ANTHROPOMETRICS  Height: 6' 4.5\"  Weight: 209.1 kg ( 461 lbs 0 oz)   Body mass index is 55.38 kg/m .  Weight Status:  Obesity Grade III BMI >40  Weight History: weight appears to fluctuate increasing from previous weights. True weight trends likely masked by fluid shifts, making it difficult to assess. Pt states that his normal body weight is around 430-450 lbs. Trying to loose weight prior to COVID-19 pandemic with the help of a dietitian.   Wt Readings from Last 10 Encounters:   07/06/20 (!) 209.1 kg (461 lb)   03/04/20 (!) 194.9 kg (429 lb 9.6 oz)   02/12/20 (!) 192 kg (423 lb 4.8 oz)   12/17/19 (!) 195 kg (430 lb)   12/13/19 (!) 195.3 kg (430 lb 8 oz)   11/12/19 (!) 197.4 kg (435 lb 1.6 oz)   10/22/19 (!) 197.9 kg (436 lb 3.2 oz)   09/10/19 (!) 204.4 kg (450 lb 11.2 oz)   08/29/19 (!) 206.7 kg (455 lb 9.6 oz)   07/05/19 (!) 210.1 kg (463 lb 3.2 oz)     LABS  Labs reviewed    Electrolytes  Potassium (mmol/L)   Date Value   07/07/2020 3.2 (L)   07/07/2020 3.4   07/06/2020 3.4    Blood Glucose  Glucose (mg/dL)   Date Value   07/07/2020 108 (H)   07/07/2020 133 (H)   07/06/2020 139 (H)   07/06/2020 131 (H)   10/30/2019 98     Hemoglobin A1C (%)   Date Value   07/07/2020 5.5   07/03/2018 5.5   05/05/2011 5.7   02/09/2010 5.6   02/27/2006 6.0    Inflammatory Markers  CRP Cardiac Risk (mg/L)   Date Value   08/05/2013 1.9   07/11/2012 2.8   05/05/2011 1.5     CRP Inflammation (mg/L)   Date Value   07/07/2020 181.0 (H)     WBC (10e9/L)   Date Value   07/07/2020 15.5 (H)   07/07/2020 15.2 (H)   07/06/2020 21.9 (H)     Albumin (g/dL)   Date Value   07/06/2020 2.1 (L)   04/24/2019 3.7   12/27/2017 3.5      Sodium (mmol/L)   Date Value   07/07/2020 134   07/07/2020 133   07/06/2020 134    Renal  Urea Nitrogen (mg/dL)   Date Value   07/07/2020 49 (H)   07/07/2020 49 (H)   07/06/2020 48 (H)     Creatinine (mg/dL)   Date Value   07/07/2020 2.05 (H)   07/07/2020 " 2.26 (H)   07/06/2020 3.04 (H)     Additional  Triglycerides (mg/dL)   Date Value   04/24/2019 90   12/27/2017 146   09/06/2016 86     Ketones Urine (mg/dL)   Date Value   02/09/2010 Negative        MEDICATIONS  Medications reviewed      ceFAZolin  1 g Intravenous Q12H     clindamycin  900 mg Intravenous Q8H     heparin ANTICOAGULANT  5,000 Units Subcutaneous Q12H     insulin aspart  1-7 Units Subcutaneous TID AC     insulin aspart  1-5 Units Subcutaneous At Bedtime     miconazole   Topical BID     sodium chloride (PF)  3 mL Intracatheter Q8H        sodium chloride        acetaminophen, glucose **OR** dextrose **OR** glucagon, HYDROcodone-acetaminophen, lidocaine 4%, lidocaine (buffered or not buffered), melatonin, naloxone, ondansetron **OR** ondansetron, polyethylene glycol, sodium chloride (PF)     ASSESSED NUTRITION NEEDS PER APPROVED PRACTICE GUIDELINES:    Dosing Weight 209.1 kg   Estimated Energy Needs: 6091-9591 (15-20 kcal/kg), MSJ = 3032 kcal   Justification: maintenance  Estimated Protein Needs: >/=209 grams protein (>/=1 g pro/Kg)  Justification: maintenance  Estimated Fluid Needs: per MD     MALNUTRITION:  % Weight Loss:  Weight loss does not meet criteria for malnutrition --> see weight section, weight trends likely influenced by fluid status   % Intake:  <75% for > 7 days (non-severe malnutrition) --> PTA, improving throughout admission   Subcutaneous Fat Loss:  Orbital region mild depletion --> will not use given only one indicator   Muscle Loss:  Temporal region moderate depletion --> will not use given only one indicator, some likely masked by adipose tissue  Fluid Retention:  Mild - Moderate (leg, knee, ankle and foot)     Malnutrition Diagnosis: Patient does not meet two of the above criteria necessary for diagnosing malnutrition    NUTRITION DIAGNOSIS:  Predicted inadequate nutrient intake related to potential for decreased appetite, as was PTA with infection     NUTRITION  INTERVENTIONS  Recommendations / Nutrition Prescription  Continue diet as ordered     Implementation  Nutrition education: Provided education on the importance of consistent meals, encouraged meals TID as appetite is beginning to come back. Pt follows with outpatient RD for weight loss.     Nutrition Goals  Pt to consume >/=75% of meals ordered TID     MONITORING AND EVALUATION:  Progress towards goals will be monitored and evaluated per protocol and Practice Guidelines    Siobhan Lowery RD, LD  Clinical Dietitian

## 2020-07-07 NOTE — PROGRESS NOTES
X-cover    Called for BPs 80-90's/mid 30's.  Spoke with RN, patient is otherwise stable and not complaining of lightheadedness and reportedly is feeling fine    Discussed with admitting provider.  Admit with sepsis 2/2 severe cellulitis    Stat lactate/bmp/cbc/crp  1 L IVF over 2 hours  Monitor closely     Labs reviewed    Renal function improving, LA 1.0, CBC with declining wbc  CRP very elevated 181    BPs still low so I went to examine him    He was awake in bed, denied dizziness/cp/sob.   Alert and oriented x 3, knew the president/covid 19, went into detail re the speech shalond[place] gave at [facility], expressed concern re [place] and the judicial system given the recent dropped case of Trav Miller, and further went on the condemn Maunel Martel for failing to take up much needed debate and votes and multiple complex political issues.       CV RRR, some le edema, radial pulses are strong.     Seems to be perfusing brain/internal organs, cont to monitor closely  Discussed with Rn

## 2020-07-07 NOTE — PLAN OF CARE
Lymphedema: Orders received, chart reviewed. Pt admitted late 7/6 for LE cellulitis/lymphedema. Will hold lymphedema wraps this date to allow for 24 hours of antibiotics and wound consult.

## 2020-07-08 ENCOUNTER — APPOINTMENT (OUTPATIENT)
Dept: CARDIOLOGY | Facility: CLINIC | Age: 60
DRG: 872 | End: 2020-07-08
Attending: INTERNAL MEDICINE
Payer: COMMERCIAL

## 2020-07-08 LAB
ANION GAP SERPL CALCULATED.3IONS-SCNC: 6 MMOL/L (ref 3–14)
BUN SERPL-MCNC: 29 MG/DL (ref 7–30)
CALCIUM SERPL-MCNC: 8.1 MG/DL (ref 8.5–10.1)
CHLORIDE SERPL-SCNC: 105 MMOL/L (ref 94–109)
CO2 SERPL-SCNC: 25 MMOL/L (ref 20–32)
CREAT SERPL-MCNC: 1.06 MG/DL (ref 0.66–1.25)
GFR SERPL CREATININE-BSD FRML MDRD: 77 ML/MIN/{1.73_M2}
GLUCOSE BLDC GLUCOMTR-MCNC: 107 MG/DL (ref 70–99)
GLUCOSE BLDC GLUCOMTR-MCNC: 107 MG/DL (ref 70–99)
GLUCOSE BLDC GLUCOMTR-MCNC: 112 MG/DL (ref 70–99)
GLUCOSE BLDC GLUCOMTR-MCNC: 91 MG/DL (ref 70–99)
GLUCOSE SERPL-MCNC: 117 MG/DL (ref 70–99)
POTASSIUM SERPL-SCNC: 3.7 MMOL/L (ref 3.4–5.3)
SODIUM SERPL-SCNC: 136 MMOL/L (ref 133–144)

## 2020-07-08 PROCEDURE — 93306 TTE W/DOPPLER COMPLETE: CPT

## 2020-07-08 PROCEDURE — 99233 SBSQ HOSP IP/OBS HIGH 50: CPT | Performed by: INTERNAL MEDICINE

## 2020-07-08 PROCEDURE — 25800030 ZZH RX IP 258 OP 636: Performed by: INTERNAL MEDICINE

## 2020-07-08 PROCEDURE — 93306 TTE W/DOPPLER COMPLETE: CPT | Mod: 26 | Performed by: INTERNAL MEDICINE

## 2020-07-08 PROCEDURE — 36415 COLL VENOUS BLD VENIPUNCTURE: CPT | Performed by: INTERNAL MEDICINE

## 2020-07-08 PROCEDURE — 80048 BASIC METABOLIC PNL TOTAL CA: CPT | Performed by: INTERNAL MEDICINE

## 2020-07-08 PROCEDURE — 25000125 ZZHC RX 250: Performed by: INTERNAL MEDICINE

## 2020-07-08 PROCEDURE — 12000000 ZZH R&B MED SURG/OB

## 2020-07-08 PROCEDURE — 25000132 ZZH RX MED GY IP 250 OP 250 PS 637: Performed by: INTERNAL MEDICINE

## 2020-07-08 PROCEDURE — 00000146 ZZHCL STATISTIC GLUCOSE BY METER IP

## 2020-07-08 PROCEDURE — 25000128 H RX IP 250 OP 636: Performed by: INTERNAL MEDICINE

## 2020-07-08 PROCEDURE — 25500064 ZZH RX 255 OP 636: Performed by: INTERNAL MEDICINE

## 2020-07-08 RX ORDER — FUROSEMIDE 10 MG/ML
40 INJECTION INTRAMUSCULAR; INTRAVENOUS
Status: DISPENSED | OUTPATIENT
Start: 2020-07-08 | End: 2020-07-10

## 2020-07-08 RX ORDER — CEFAZOLIN SODIUM 2 G/100ML
2 INJECTION, SOLUTION INTRAVENOUS EVERY 8 HOURS
Status: DISCONTINUED | OUTPATIENT
Start: 2020-07-08 | End: 2020-07-10

## 2020-07-08 RX ADMIN — CEFAZOLIN SODIUM 2 G: 2 INJECTION, SOLUTION INTRAVENOUS at 13:26

## 2020-07-08 RX ADMIN — SODIUM CHLORIDE: 9 INJECTION, SOLUTION INTRAVENOUS at 18:52

## 2020-07-08 RX ADMIN — CEFAZOLIN SODIUM 2 G: 2 INJECTION, SOLUTION INTRAVENOUS at 21:31

## 2020-07-08 RX ADMIN — ACETAMINOPHEN 650 MG: 325 TABLET, FILM COATED ORAL at 05:55

## 2020-07-08 RX ADMIN — CLINDAMYCIN IN 5 PERCENT DEXTROSE 900 MG: 18 INJECTION, SOLUTION INTRAVENOUS at 14:48

## 2020-07-08 RX ADMIN — CEFAZOLIN SODIUM 2 G: 2 INJECTION, SOLUTION INTRAVENOUS at 02:57

## 2020-07-08 RX ADMIN — MICONAZOLE NITRATE: 20 POWDER TOPICAL at 11:03

## 2020-07-08 RX ADMIN — HEPARIN SODIUM 5000 UNITS: 10000 INJECTION, SOLUTION INTRAVENOUS; SUBCUTANEOUS at 10:55

## 2020-07-08 RX ADMIN — MICONAZOLE NITRATE: 20 POWDER TOPICAL at 21:33

## 2020-07-08 RX ADMIN — HEPARIN SODIUM 5000 UNITS: 10000 INJECTION, SOLUTION INTRAVENOUS; SUBCUTANEOUS at 21:31

## 2020-07-08 RX ADMIN — SILVER SULFADIAZINE: 10 CREAM TOPICAL at 14:29

## 2020-07-08 RX ADMIN — HUMAN ALBUMIN MICROSPHERES AND PERFLUTREN 3 ML: 10; .22 INJECTION, SOLUTION INTRAVENOUS at 14:00

## 2020-07-08 RX ADMIN — SODIUM CHLORIDE: 9 INJECTION, SOLUTION INTRAVENOUS at 08:44

## 2020-07-08 RX ADMIN — CLINDAMYCIN IN 5 PERCENT DEXTROSE 900 MG: 18 INJECTION, SOLUTION INTRAVENOUS at 05:55

## 2020-07-08 RX ADMIN — CLINDAMYCIN IN 5 PERCENT DEXTROSE 900 MG: 18 INJECTION, SOLUTION INTRAVENOUS at 22:18

## 2020-07-08 ASSESSMENT — ACTIVITIES OF DAILY LIVING (ADL)
ADLS_ACUITY_SCORE: 15
ADLS_ACUITY_SCORE: 17
ADLS_ACUITY_SCORE: 15
ADLS_ACUITY_SCORE: 17
ADLS_ACUITY_SCORE: 15
ADLS_ACUITY_SCORE: 17

## 2020-07-08 NOTE — PROGRESS NOTES
"[facility]  Hospitalist Progress Note  Hima Ashraf MD 07/08/2020    Reason for Stay (Diagnosis): sepsis rleated to left LE cellulitis         Assessment and Plan:      Summary of Stay: Da Mustafa is a 59 year old male who came to attention on 7/6/2020 with malaise and fevers.       Prior medical history is significant for severe obesity (body mass index approximately 55) and related complications including glucose intolerance, chronic lower extremity edema, hypertension and possibly sleep apnea.    Problem List:   1. Left lower extremity cellulitis in the setting of chronic edema.  I suspect the chronic edema is due to venous insufficiency as the patient's heart function is completely normal.  2. Sepsis.  Dramatically improved.  3. Acute kidney injury with baseline creatinine 0.9 with initial creatinine 3.2.  Resolved  4. Hypertension.  5. Severe Obesity.     Plan:  1.  Continue cefazolin with clindamycin (at least for the next 48 hours) for synergy.  2.  We will start diuresis now but recognize that heart failure is not the primary issue.  Anticipate the patient will start on lisinopril as soon as his blood pressure is high enough.  3.  Monitor glucoses on medium intensity sliding scale.  4.  Elevate left lower extremity as much as tolerable.  5.  Discontinue IV fluids start Lasix tomorrow.     DVT Prophylaxis: Heparin SQ  Code Status: Full Code  Discharge Dispo: likely return to home, though will need to eval for mobility needs.  Estimated Disch Date / # of Days until Disch: likely more than 5 days.        Interval History (Subjective):      Chart reviewed, pt interviewed.    As above and as noted in Dr. Lancaster's note: pt has been ill with systemic symptoms over the past week or so.                   Physical Exam:      Last Vital Signs:  /47   Pulse 77   Temp 98.3  F (36.8  C) (Temporal)   Resp 18   Ht 1.943 m (6' 4.5\")   Wt (!) 209.1 kg (461 lb)   SpO2 94%   BMI 55.38 " kg/m      I/O last 3 completed shifts:  In: 250 [P.O.:250]  Out: 2050 [Urine:2050]    Constitutional: Awake, alert, cooperative, no apparent distress   Respiratory: Clear to auscultation bilaterally, no crackles or wheezing   Cardiovascular: Regular rate and rhythm, normal S1 and S2, and no murmur noted   Abdomen: Normal bowel sounds, soft, non-distended, non-tender   Skin: bilat LE edema.  Right anterior shin with a circular lesion of about 3 cm diameter is denuded.     The left leg is hot and inflamed appearing with generalized swelling and areas of slough along with many areas of apparent vesicles.    Neuro: Alert and oriented x3.   Extremities:    Other(s):        All other systems: Negative          Medications:      All current medications were reviewed with changes reflected in problem list.         Data:      All new lab and imaging data was reviewed.   Labs/Imaging:  Results for orders placed or performed during the hospital encounter of 07/06/20 (from the past 24 hour(s))   Glucose by meter   Result Value Ref Range    Glucose 116 (H) 70 - 99 mg/dL   Glucose by meter   Result Value Ref Range    Glucose 107 (H) 70 - 99 mg/dL   Basic metabolic panel   Result Value Ref Range    Sodium 136 133 - 144 mmol/L    Potassium 3.7 3.4 - 5.3 mmol/L    Chloride 105 94 - 109 mmol/L    Carbon Dioxide 25 20 - 32 mmol/L    Anion Gap 6 3 - 14 mmol/L    Glucose 117 (H) 70 - 99 mg/dL    Urea Nitrogen 29 7 - 30 mg/dL    Creatinine 1.06 0.66 - 1.25 mg/dL    GFR Estimate 77 >60 mL/min/[1.73_m2]    GFR Estimate If Black 89 >60 mL/min/[1.73_m2]    Calcium 8.1 (L) 8.5 - 10.1 mg/dL   Glucose by meter   Result Value Ref Range    Glucose 112 (H) 70 - 99 mg/dL   Glucose by meter   Result Value Ref Range    Glucose 107 (H) 70 - 99 mg/dL   Echocardiogram Complete    Narrative    [id number]  [id number]  [id number]  [id number]^ONNA^AC^R           [facility]  Echocardiography Laboratory  [address] [place] [address]         Name: RANJIT COSME  MRN: [id number]  : 1960  Study Date: 2020 01:30 PM  Age: 59 yrs  Gender: Male  Patient [facility]: [facility]  Reason For Study: CHF  Ordering Physician: AC CASTELLANO  Referring Physician: Robel Morris PA-C  Performed By: Patricia Chisholm     BSA: 3.2 m2  Height: 76 in  Weight: 461 lb  HR: 80  BP: 111/47 mmHg  _____________________________________________________________________________  __        Procedure  Complete Portable Echo Adult. Optison (NDC #[id number]) given intravenously.  _____________________________________________________________________________  __        Interpretation Summary     Technically difficult imaging.  Hyperdynamic left ventricular function  The visual ejection fraction is estimated at 65-70%.  The left ventricle is normal in size.  The right ventricle is normal in structure, function and size.  Mild aortic root dilatation.  Doppler interrogation does not demonstrate significant stenosis or  insufficiency involving cardiac valves.     No old studies available for comparison.  _____________________________________________________________________________  __        Left Ventricle  The left ventricle is normal in size. There is normal left ventricular wall  thickness. Hyperdynamic left ventricular function. The visual ejection  fraction is estimated at 65-70%. Left ventricular diastolic function is  normal. No regional wall motion abnormalities noted. There is no thrombus seen  in the left ventricle.     Right Ventricle  The right ventricle is normal in structure, function and size. There is no  mass or thrombus in the right ventricle.     Atria  Normal left atrial size. Right atrial size is normal. There is no atrial shunt  seen. The left atrial appendage is not well visualized.     Mitral Valve  The mitral valve leaflets appear normal. There is no evidence of stenosis,  fluttering, or prolapse. There is no mitral regurgitation noted. Normal  mitral  valve velocity.        Tricuspid Valve  Normal tricuspid valve. No tricuspid regurgitation. Right ventricular systolic  pressure could not be approximated due to inadequate tricuspid regurgitation.  There is no tricuspid stenosis.     Aortic Valve  The aortic valve is trileaflet. No aortic regurgitation is present. No aortic  stenosis is present.     Pulmonic Valve  The pulmonic valve is not well seen, but is grossly normal. There is no  pulmonic valvular regurgitation. There is no pulmonic valvular stenosis.     Vessels  Mild aortic root dilatation. Normal size descending aorta. The inferior vena  cava is normal. The pulmonary artery is normal size.     Pericardium  The pericardium appears normal. There is no pleural effusion.        Rhythm  Sinus rhythm was noted.  _____________________________________________________________________________  __  MMode/2D Measurements & Calculations  IVSd: 1.0 cm     LVIDd: 6.0 cm  LVIDs: 4.1 cm  LVPWd: 0.97 cm  FS: 32.3 %  LV mass(C)d: 245.2 grams  LV mass(C)dI: 77.6 grams/m2  Ao root diam: 4.1 cm  asc Aorta Diam: 3.3 cm  LVOT diam: 2.6 cm  LVOT area: 5.3 cm2  LA Volume (BP): 119.0 ml  LA Volume Index (BP): 37.7 ml/m2  RWT: 0.32           Doppler Measurements & Calculations  MV E max luz: 104.3 cm/sec  MV A max luz: 94.9 cm/sec  MV E/A: 1.1  MV dec time: 0.23 sec  E/E' av.0  Lateral E/e': 7.3  Medial E/e': 8.8           _____________________________________________________________________________  __           Report approved by: Dr. Trav Leon 2020 02:19 PM      Glucose by meter   Result Value Ref Range    Glucose 91 70 - 99 mg/dL

## 2020-07-08 NOTE — PLAN OF CARE
PT: Have received orders for lymphedema therapy.  Patient admitted for sepsis with chronic bilateral LE edema with wounds.  Per discussion with RN and MD, will hold lymphedema evaluation until tomorrow to allow for medical management prior to initiating lymphedema wrapping.

## 2020-07-08 NOTE — PLAN OF CARE
Pt A/O, afebrile. Tylenol for pain. Wound care done at 2030 yesterday. Up with 1-2 assist and boosting with lift. BS is 116. IV infusing. voiding without difficulty. Plan for discharge in 5 days.

## 2020-07-08 NOTE — PROGRESS NOTES
"[facility]  Infectious Disease Progress Note          Assessment and Plan:   IMPRESSION:   1.  A 59-year-old male with acute sepsis, severe left leg cellulitis with underlying lymphedema, very likely strep, but multiple organisms possible in this setting.   2.  Severe venous stasis and lymphedema including recurrent bilateral leg wounds, major blistering areas currently probably related to streptococcal cellulitis.   3.  Recurrent cellulitis in the past, although no prior hospitalizations, manageable as an outpatient prior to this.   4.  Morbid obesity.   5.  Acute renal failure related to sepsis.   6.  COVID-19 rule out, but given this alternative diagnosis despite some suggestive symptoms, unlikely COVID-19.      RECOMMENDATIONS:   1.  Discontinue COVID isolation.   2.  Continue antibiotics, increase Ancef dose as renal function improving and continue clindamycin.   3.  Neg blood cultures.  Significant rigors and sepsis, so quite possible bacteremic at least transiently.   4.  Leg elevation as much as possible.   5.  Wound care as needed. Wd cx with GNR doubt this is sig relative to leg cellulitis        Interval History:   no new complaints and doing well; no cp, sob, n/v/d, or abd pain. Pain and leg better T down BC neg leg wd cx staph and GNR              Medications:       ceFAZolin  2 g Intravenous Q8H     clindamycin  900 mg Intravenous Q8H     heparin ANTICOAGULANT  5,000 Units Subcutaneous Q12H     insulin aspart  1-7 Units Subcutaneous TID AC     insulin aspart  1-5 Units Subcutaneous At Bedtime     miconazole   Topical BID     silver sulfADIAZINE   Topical Daily     sodium chloride (PF)  3 mL Intracatheter Q8H                  Physical Exam:   Blood pressure (!) 111/37, pulse 77, temperature 98.7  F (37.1  C), temperature source Temporal, resp. rate 16, height 1.943 m (6' 4.5\"), weight (!) 209.1 kg (461 lb), SpO2 95 %.  Wt Readings from Last 2 Encounters:   07/06/20 (!) 209.1 kg " (461 lb)   03/04/20 (!) 194.9 kg (429 lb 9.6 oz)     Vital Signs with Ranges  Temp:  [98.7  F (37.1  C)-99.4  F (37.4  C)] 98.7  F (37.1  C)  Pulse:  [77-80] 77  Heart Rate:  [78-87] 83  Resp:  [16-18] 16  BP: (104-111)/(37-47) 111/37  SpO2:  [93 %-99 %] 95 %    Constitutional: Awake, alert, cooperative, no apparent distress   Lungs: Clear to auscultation bilaterally, no crackles or wheezing   Cardiovascular: Regular rate and rhythm, normal S1 and S2, and no murmur noted   Abdomen: Normal bowel sounds, soft, non-distended, non-tender   Skin: No rashes, no cyanosis, major edema L leg a bit better   Other:           Data:   All microbiology laboratory data reviewed.  Recent Labs   Lab Test 07/07/20  0808 07/07/20  0041 07/06/20  1341   WBC 15.5* 15.2* 21.9*   HGB 11.3* 11.1* 13.9   HCT 34.8* 33.3* 43.1   MCV 93 91 93    242 317     Recent Labs   Lab Test 07/08/20  0634 07/07/20  0808 07/07/20  0041   CR 1.06 2.05* 2.26*     No lab results found.  Recent Labs   Lab Test 07/07/20  0930 07/06/20  1434 07/06/20  1340 07/03/18  0945   CULT Light growth  Staphylococcus aureus  *  Culture in progress  Light growth  Lactose fermenting gram negative rods  *  Moderate growth  Staphylococcus aureus  *  Culture in progress No growth after 2 days No growth after 2 days Heavy growth  Escherichia coli  *  Heavy growth  Acinetobacter baumannii complex  *  Heavy growth  Staphylococcus aureus  *  Light growth  Beta hemolytic Streptococcus group G  *  Heavy growth  Corynebacterium striatum  Identification obtained by MALDI-TOF mass spectrometry research use only database. Test   characteristics determined and verified by the Infectious [facility] Diagnostic Laboratory   ([facility]) [place], [place].  Susceptibility testing not routinely done  *  Heavy growth  Enterococcus faecalis  *

## 2020-07-08 NOTE — PLAN OF CARE
Pt A&O x4. VS stable; afebrile. Denies pain. CMS intact. +2-3 edema to BLE's. Dressing's changed. Continues on IV ancef and cleocin. Up w/ A1, using walker. Voiding in good amts. Tolerating regular diet. Will continue to monitor.

## 2020-07-09 LAB
ANION GAP SERPL CALCULATED.3IONS-SCNC: 4 MMOL/L (ref 3–14)
BACTERIA SPEC CULT: ABNORMAL
BACTERIA SPEC CULT: ABNORMAL
BUN SERPL-MCNC: 17 MG/DL (ref 7–30)
CALCIUM SERPL-MCNC: 7.8 MG/DL (ref 8.5–10.1)
CHLORIDE SERPL-SCNC: 108 MMOL/L (ref 94–109)
CO2 SERPL-SCNC: 27 MMOL/L (ref 20–32)
CREAT SERPL-MCNC: 0.9 MG/DL (ref 0.66–1.25)
ERYTHROCYTE [DISTWIDTH] IN BLOOD BY AUTOMATED COUNT: 12.9 % (ref 10–15)
GFR SERPL CREATININE-BSD FRML MDRD: >90 ML/MIN/{1.73_M2}
GLUCOSE BLDC GLUCOMTR-MCNC: 76 MG/DL (ref 70–99)
GLUCOSE BLDC GLUCOMTR-MCNC: 91 MG/DL (ref 70–99)
GLUCOSE SERPL-MCNC: 122 MG/DL (ref 70–99)
HCT VFR BLD AUTO: 32.8 % (ref 40–53)
HGB BLD-MCNC: 10.3 G/DL (ref 13.3–17.7)
Lab: ABNORMAL
MCH RBC QN AUTO: 29.5 PG (ref 26.5–33)
MCHC RBC AUTO-ENTMCNC: 31.4 G/DL (ref 31.5–36.5)
MCV RBC AUTO: 94 FL (ref 78–100)
PLATELET # BLD AUTO: 294 10E9/L (ref 150–450)
POTASSIUM SERPL-SCNC: 3.9 MMOL/L (ref 3.4–5.3)
RBC # BLD AUTO: 3.49 10E12/L (ref 4.4–5.9)
SODIUM SERPL-SCNC: 139 MMOL/L (ref 133–144)
SPECIMEN SOURCE: ABNORMAL
WBC # BLD AUTO: 13.2 10E9/L (ref 4–11)

## 2020-07-09 PROCEDURE — 25000132 ZZH RX MED GY IP 250 OP 250 PS 637: Performed by: INTERNAL MEDICINE

## 2020-07-09 PROCEDURE — 00000146 ZZHCL STATISTIC GLUCOSE BY METER IP

## 2020-07-09 PROCEDURE — 12000000 ZZH R&B MED SURG/OB

## 2020-07-09 PROCEDURE — 25000128 H RX IP 250 OP 636: Performed by: INTERNAL MEDICINE

## 2020-07-09 PROCEDURE — 85027 COMPLETE CBC AUTOMATED: CPT | Performed by: INTERNAL MEDICINE

## 2020-07-09 PROCEDURE — 36415 COLL VENOUS BLD VENIPUNCTURE: CPT | Performed by: INTERNAL MEDICINE

## 2020-07-09 PROCEDURE — 99233 SBSQ HOSP IP/OBS HIGH 50: CPT | Performed by: INTERNAL MEDICINE

## 2020-07-09 PROCEDURE — 80048 BASIC METABOLIC PNL TOTAL CA: CPT | Performed by: INTERNAL MEDICINE

## 2020-07-09 PROCEDURE — 25000125 ZZHC RX 250: Performed by: INTERNAL MEDICINE

## 2020-07-09 RX ADMIN — SILVER SULFADIAZINE: 10 CREAM TOPICAL at 09:47

## 2020-07-09 RX ADMIN — SALINE NASAL SPRAY 1 SPRAY: 1.5 SOLUTION NASAL at 21:41

## 2020-07-09 RX ADMIN — MICONAZOLE NITRATE: 20 POWDER TOPICAL at 21:35

## 2020-07-09 RX ADMIN — ENOXAPARIN SODIUM 40 MG: 40 INJECTION SUBCUTANEOUS at 09:47

## 2020-07-09 RX ADMIN — MICONAZOLE NITRATE: 20 POWDER TOPICAL at 09:47

## 2020-07-09 RX ADMIN — CLINDAMYCIN IN 5 PERCENT DEXTROSE 900 MG: 18 INJECTION, SOLUTION INTRAVENOUS at 05:58

## 2020-07-09 RX ADMIN — CEFAZOLIN SODIUM 2 G: 2 INJECTION, SOLUTION INTRAVENOUS at 05:09

## 2020-07-09 RX ADMIN — ENOXAPARIN SODIUM 40 MG: 40 INJECTION SUBCUTANEOUS at 21:35

## 2020-07-09 RX ADMIN — CEFAZOLIN SODIUM 2 G: 2 INJECTION, SOLUTION INTRAVENOUS at 14:06

## 2020-07-09 RX ADMIN — CEFAZOLIN SODIUM 2 G: 2 INJECTION, SOLUTION INTRAVENOUS at 21:35

## 2020-07-09 RX ADMIN — Medication 1 MG: at 00:12

## 2020-07-09 RX ADMIN — FUROSEMIDE 40 MG: 10 INJECTION, SOLUTION INTRAMUSCULAR; INTRAVENOUS at 15:53

## 2020-07-09 RX ADMIN — SALINE NASAL SPRAY 1 SPRAY: 1.5 SOLUTION NASAL at 14:06

## 2020-07-09 RX ADMIN — CLINDAMYCIN IN 5 PERCENT DEXTROSE 900 MG: 18 INJECTION, SOLUTION INTRAVENOUS at 22:19

## 2020-07-09 RX ADMIN — CLINDAMYCIN IN 5 PERCENT DEXTROSE 900 MG: 18 INJECTION, SOLUTION INTRAVENOUS at 14:54

## 2020-07-09 ASSESSMENT — ACTIVITIES OF DAILY LIVING (ADL)
ADLS_ACUITY_SCORE: 16
ADLS_ACUITY_SCORE: 15
ADLS_ACUITY_SCORE: 16
ADLS_ACUITY_SCORE: 16
ADLS_ACUITY_SCORE: 15
ADLS_ACUITY_SCORE: 15

## 2020-07-09 NOTE — PROGRESS NOTES
[facility]  Hospitalist Progress Note  Venkata Mcmahon MD 07/09/2020    Reason for Stay (Diagnosis): sepsis rleated to left LE cellulitis         Assessment and Plan:      Summary of Stay: Da Mustafa is a 59 year old male with prior medical history is significant for severe obesity (body mass index approximately 55) and related complications including glucose intolerance, chronic lower extremity edema, hypertension and possibly sleep apnea currently not treated due to intolerance of his mask who came to attention on 7/6/2020 with malaise and fevers with obvious left lower extremity cellulitis as well as acute kidney injury with creatinine of 3.2 from baseline of 0.9.  He was started on Ancef and clindamycin and has gradually clinically improved though multiple more days are expected here in the hospital.  And is back to baseline at 0.9..         Problem List:   1. Left lower extremity cellulitis in the setting of chronic edema.  I suspect the chronic edema is due to venous insufficiency and possibly untreated sleep apnea.  --Continue Ancef and clindamycin  --Diurese as tolerated.  Currently on Lasix 40 mg twice daily.  --Needs long-term lymphedema measures once acute infection is treated.    2. Sepsis.  Dramatically improved.  Hold further IV fluids given lymphedema.    3. Acute kidney injury with baseline creatinine 0.9 with initial creatinine 3.2.  Resolved    4. Hypertension.    5. Severe Obesity.  Following with the bariatric clinic in a diet.  Lost over 40 pounds.       DVT Prophylaxis: Heparin SQ  Code Status: Full Code  Discharge Dispo: likely return to home, though will need to eval for mobility needs.  Estimated Disch Date / # of Days until Disch: likely more than 3 days.        Interval History (Subjective):      Chart reviewed, pt interviewed.  Overall feeling much better.  Renal function normalizing  Stopping fluids, starting IV Lasix  Updated his wife at the  "bedside  Still has extensive patchy cellulitis involving much of his left lower extremity.                  Physical Exam:      Last Vital Signs:  /43 (BP Location: Right arm)   Pulse 72   Temp 98.6  F (37  C) (Temporal)   Resp 18   Ht 1.943 m (6' 4.5\")   Wt (!) 209.1 kg (461 lb)   SpO2 95%   BMI 55.38 kg/m      I/O last 3 completed shifts:  In: 6123 [P.O.:1090; I.V.:5033]  Out: 2475 [Urine:2475]    Constitutional: Awake, alert, cooperative, no apparent distress   Respiratory: Clear to auscultation bilaterally, no crackles or wheezing   Cardiovascular: Regular rate and rhythm, normal S1 and S2, and no murmur noted   Abdomen: Normal bowel sounds, soft, non-distended, non-tender   Skin: bilat LE edema.  Right anterior shin with a circular lesion of about 3 cm diameter is denuded.     The left leg is hot and inflamed appearing with generalized swelling and areas of slough along with many areas of apparent vesicles.    Neuro: Alert and oriented x3.   Extremities:    Other(s):        All other systems: Negative          Medications:      All current medications were reviewed with changes reflected in problem list.         Data:      All new lab and imaging data was reviewed.   Labs/Imaging:  Results for orders placed or performed during the hospital encounter of 20 (from the past 24 hour(s))   Echocardiogram Complete    Narrative    [id number]  [id number]  [id number]  [id number]^NONA^[id number]^R           [facility]  Echocardiography Laboratory  [address] [place] [address]        Name: RANJIT COSME  MRN: [id number]  : 1960  Study Date: 2020 01:30 PM  Age: 59 yrs  Gender: Male  Patient Location: [facility]  Reason For Study: CHF  Ordering Physician: AC CASTELLANO  Referring Physician: Robel Morris PA-C  Performed By: Patricia Chisholm     BSA: 3.2 m2  Height: 76 in  Weight: 461 lb  HR: 80  BP: 111/47 " mmHg  _____________________________________________________________________________  __        Procedure  Complete Portable Echo Adult. Optison (NDC #[id number]) given intravenously.  _____________________________________________________________________________  __        Interpretation Summary     Technically difficult imaging.  Hyperdynamic left ventricular function  The visual ejection fraction is estimated at 65-70%.  The left ventricle is normal in size.  The right ventricle is normal in structure, function and size.  Mild aortic root dilatation.  Doppler interrogation does not demonstrate significant stenosis or  insufficiency involving cardiac valves.     No old studies available for comparison.  _____________________________________________________________________________  __        Left Ventricle  The left ventricle is normal in size. There is normal left ventricular wall  thickness. Hyperdynamic left ventricular function. The visual ejection  fraction is estimated at 65-70%. Left ventricular diastolic function is  normal. No regional wall motion abnormalities noted. There is no thrombus seen  in the left ventricle.     Right Ventricle  The right ventricle is normal in structure, function and size. There is no  mass or thrombus in the right ventricle.     Atria  Normal left atrial size. Right atrial size is normal. There is no atrial shunt  seen. The left atrial appendage is not well visualized.     Mitral Valve  The mitral valve leaflets appear normal. There is no evidence of stenosis,  fluttering, or prolapse. There is no mitral regurgitation noted. Normal mitral  valve velocity.        Tricuspid Valve  Normal tricuspid valve. No tricuspid regurgitation. Right ventricular systolic  pressure could not be approximated due to inadequate tricuspid regurgitation.  There is no tricuspid stenosis.     Aortic Valve  The aortic valve is trileaflet. No aortic regurgitation is present. No aortic  stenosis is  present.     Pulmonic Valve  The pulmonic valve is not well seen, but is grossly normal. There is no  pulmonic valvular regurgitation. There is no pulmonic valvular stenosis.     Vessels  Mild aortic root dilatation. Normal size descending aorta. The inferior vena  cava is normal. The pulmonary artery is normal size.     Pericardium  The pericardium appears normal. There is no pleural effusion.        Rhythm  Sinus rhythm was noted.  _____________________________________________________________________________  __  MMode/2D Measurements & Calculations  IVSd: 1.0 cm     LVIDd: 6.0 cm  LVIDs: 4.1 cm  LVPWd: 0.97 cm  FS: 32.3 %  LV mass(C)d: 245.2 grams  LV mass(C)dI: 77.6 grams/m2  Ao root diam: 4.1 cm  asc Aorta Diam: 3.3 cm  LVOT diam: 2.6 cm  LVOT area: 5.3 cm2  LA Volume (BP): 119.0 ml  LA Volume Index (BP): 37.7 ml/m2  RWT: 0.32           Doppler Measurements & Calculations  MV E max luz: 104.3 cm/sec  MV A max luz: 94.9 cm/sec  MV E/A: 1.1  MV dec time: 0.23 sec  E/E' av.0  Lateral E/e': 7.3  Medial E/e': 8.8           _____________________________________________________________________________  __           Report approved by: Dr. Trav Leon 2020 02:19 PM      Glucose by meter   Result Value Ref Range    Glucose 91 70 - 99 mg/dL   Basic metabolic panel   Result Value Ref Range    Sodium 139 133 - 144 mmol/L    Potassium 3.9 3.4 - 5.3 mmol/L    Chloride 108 94 - 109 mmol/L    Carbon Dioxide 27 20 - 32 mmol/L    Anion Gap 4 3 - 14 mmol/L    Glucose 122 (H) 70 - 99 mg/dL    Urea Nitrogen 17 7 - 30 mg/dL    Creatinine 0.90 0.66 - 1.25 mg/dL    GFR Estimate >90 >60 mL/min/[1.73_m2]    GFR Estimate If Black >90 >60 mL/min/[1.73_m2]    Calcium 7.8 (L) 8.5 - 10.1 mg/dL   CBC with platelets   Result Value Ref Range    WBC 13.2 (H) 4.0 - 11.0 10e9/L    RBC Count 3.49 (L) 4.4 - 5.9 10e12/L    Hemoglobin 10.3 (L) 13.3 - 17.7 g/dL    Hematocrit 32.8 (L) 40.0 - 53.0 %    MCV 94 78 - 100 fl    MCH 29.5 26.5  - 33.0 pg    MCHC 31.4 (L) 31.5 - 36.5 g/dL    RDW 12.9 10.0 - 15.0 %    Platelet Count 294 150 - 450 10e9/L   Glucose by meter   Result Value Ref Range    Glucose 91 70 - 99 mg/dL

## 2020-07-09 NOTE — PLAN OF CARE
Alert and oriented. Soft BPs at times. Tolerating regular diet, monitoring BG levels. Transfers with Ax1, gait belt, and walker. Wound cares done to BLE per [facility] orders. Edema to BLE, elevated on foam wedge pillows. Pt sits on foam seat up in recliner and has Pulsate mattress for bed. Voiding in adequate amounts. Given IV Lasix x1. On IV Cleocin and Ancef. Denies pain.

## 2020-07-09 NOTE — PLAN OF CARE
PT is A&Ox4. On room air. IVF infusing. Denying pain. Up with A-1, gait belt, and walker. Voiding spontaneously. Legs elevated and dressings to BLEs have small amounts of moist drainage. Edema to BLEs. Miconazole applied to abdominal folds. PT had nose bleed this am. Did not bleed long. Will continue to monitor. PT notes he gets nose bleeds at home from time to time.   Plan to continue with IV Ancef and Cleocin. Plan of care reviewed with patient.

## 2020-07-09 NOTE — PROGRESS NOTES
"[facility]  Infectious Disease Progress Note          Assessment and Plan:   IMPRESSION:   1.  A 59-year-old male with acute sepsis, severe left leg cellulitis with underlying lymphedema, very likely strep, but multiple organisms possible in this setting.   2.  Severe venous stasis and lymphedema including recurrent bilateral leg wounds, major blistering areas currently probably related to streptococcal cellulitis.   3.  Recurrent cellulitis in the past, although no prior hospitalizations, manageable as an outpatient prior to this.   4.  Morbid obesity.   5.  Acute renal failure related to sepsis.   6.  COVID-19 rule out, but given this alternative diagnosis despite some suggestive symptoms, unlikely COVID-19.      RECOMMENDATIONS:   1.  Discontinue COVID isolation.   2.  Continue antibiotics, increase Ancef dose as renal function improving and continue clindamycin.   3.  Neg blood cultures.  Significant rigors and sepsis, so quite possible bacteremic at least transiently.   4.  Leg elevation as much as possible.   5.  Wound care as needed. Wd cx with 5 orgs doubt this is sig relative to leg cellulitis, both strep and staph aureus in cxs          Interval History:   no new complaints and doing well; no cp, sob, n/v/d, or abd pain. Pain and leg better T down BC neg leg wd cx staph 2 GNR, strep etc              Medications:       ceFAZolin  2 g Intravenous Q8H     clindamycin  900 mg Intravenous Q8H     enoxaparin ANTICOAGULANT  40 mg Subcutaneous Q12H     furosemide  40 mg Intravenous BID     insulin aspart  1-7 Units Subcutaneous TID AC     miconazole   Topical BID     silver sulfADIAZINE   Topical Daily     sodium chloride (PF)  3 mL Intracatheter Q8H                  Physical Exam:   Blood pressure 100/43, pulse 72, temperature 98.6  F (37  C), temperature source Temporal, resp. rate 18, height 1.943 m (6' 4.5\"), weight (!) 209.1 kg (461 lb), SpO2 95 %.  Wt Readings from Last 2 Encounters: "   07/06/20 (!) 209.1 kg (461 lb)   03/04/20 (!) 194.9 kg (429 lb 9.6 oz)     Vital Signs with Ranges  Temp:  [98.3  F (36.8  C)-100.3  F (37.9  C)] 98.6  F (37  C)  Pulse:  [72-80] 72  Heart Rate:  [73-85] 73  Resp:  [16-18] 18  BP: (100-123)/(43-54) 100/43  SpO2:  [91 %-98 %] 95 %    Constitutional: Awake, alert, cooperative, no apparent distress   Lungs: Clear to auscultation bilaterally, no crackles or wheezing   Cardiovascular: Regular rate and rhythm, normal S1 and S2, and no murmur noted   Abdomen: Normal bowel sounds, soft, non-distended, non-tender   Skin: No rashes, no cyanosis, major edema L leg a bit better   Other:           Data:   All microbiology laboratory data reviewed.  Recent Labs   Lab Test 07/09/20  0710 07/07/20  0808 07/07/20  0041   WBC 13.2* 15.5* 15.2*   HGB 10.3* 11.3* 11.1*   HCT 32.8* 34.8* 33.3*   MCV 94 93 91    263 242     Recent Labs   Lab Test 07/09/20  0710 07/08/20  0634 07/07/20  0808   CR 0.90 1.06 2.05*     No lab results found.  Recent Labs   Lab Test 07/07/20  0930 07/06/20  1434 07/06/20  1340 07/03/18  0945   CULT Light growth  Lactose fermenting gram negative rods  *  Moderate growth  Staphylococcus aureus  Susceptibility testing in progress  *  Light growth  Beta hemolytic Streptococcus group G  Susceptibility testing in progress  *  Light growth  Probable  Stenotrophomonas maltophilia  *  Heavy growth  Corynebacterium striatum  Identification obtained by MALDI-TOF mass spectrometry research use only database. Test   characteristics determined and verified by the Infectious Diseases Diagnostic Laboratory   ([facility]) [place], [place].  Susceptibility testing not routinely done  *  Culture in progress  Light growth  Staphylococcus aureus  Susceptibility testing done on previous specimen  *  Light growth  Normal skin alfonso   No growth after 3 days No growth after 3 days Heavy growth  Escherichia coli  *  Heavy growth  Acinetobacter baumannii complex  *  Heavy  growth  Staphylococcus aureus  *  Light growth  Beta hemolytic Streptococcus group G  *  Heavy growth  Corynebacterium striatum  Identification obtained by MALDI-TOF mass spectrometry research use only database. Test   characteristics determined and verified by the Infectious Diseases Diagnostic Laboratory   ([facility]) [place], [place].  Susceptibility testing not routinely done  *  Heavy growth  Enterococcus faecalis  *

## 2020-07-09 NOTE — PLAN OF CARE
PT: Have received orders for lymphedema therapy.  Patient admitted for sepsis with chronic bilateral LE edema with wounds.  Per discussion with RN and MD, will hold lymphedema evaluation at this time to allow for medical management prior to initiating lymphedema wrapping.

## 2020-07-10 LAB
ANION GAP SERPL CALCULATED.3IONS-SCNC: 3 MMOL/L (ref 3–14)
BASOPHILS # BLD AUTO: 0 10E9/L (ref 0–0.2)
BASOPHILS NFR BLD AUTO: 0 %
BUN SERPL-MCNC: 11 MG/DL (ref 7–30)
CALCIUM SERPL-MCNC: 7.9 MG/DL (ref 8.5–10.1)
CHLORIDE SERPL-SCNC: 106 MMOL/L (ref 94–109)
CO2 SERPL-SCNC: 29 MMOL/L (ref 20–32)
CREAT SERPL-MCNC: 0.84 MG/DL (ref 0.66–1.25)
DIFFERENTIAL METHOD BLD: ABNORMAL
EOSINOPHIL # BLD AUTO: 0.7 10E9/L (ref 0–0.7)
EOSINOPHIL NFR BLD AUTO: 5 %
ERYTHROCYTE [DISTWIDTH] IN BLOOD BY AUTOMATED COUNT: 12.8 % (ref 10–15)
GFR SERPL CREATININE-BSD FRML MDRD: >90 ML/MIN/{1.73_M2}
GLUCOSE BLDC GLUCOMTR-MCNC: 107 MG/DL (ref 70–99)
GLUCOSE BLDC GLUCOMTR-MCNC: 136 MG/DL (ref 70–99)
GLUCOSE BLDC GLUCOMTR-MCNC: 87 MG/DL (ref 70–99)
GLUCOSE BLDC GLUCOMTR-MCNC: 88 MG/DL (ref 70–99)
GLUCOSE BLDC GLUCOMTR-MCNC: 95 MG/DL (ref 70–99)
GLUCOSE SERPL-MCNC: 109 MG/DL (ref 70–99)
HCT VFR BLD AUTO: 32.8 % (ref 40–53)
HGB BLD-MCNC: 10.5 G/DL (ref 13.3–17.7)
LYMPHOCYTES # BLD AUTO: 2 10E9/L (ref 0.8–5.3)
LYMPHOCYTES NFR BLD AUTO: 15 %
MCH RBC QN AUTO: 30.1 PG (ref 26.5–33)
MCHC RBC AUTO-ENTMCNC: 32 G/DL (ref 31.5–36.5)
MCV RBC AUTO: 94 FL (ref 78–100)
METAMYELOCYTES # BLD: 0.4 10E9/L
METAMYELOCYTES NFR BLD MANUAL: 3 %
MONOCYTES # BLD AUTO: 1.5 10E9/L (ref 0–1.3)
MONOCYTES NFR BLD AUTO: 11 %
NEUTROPHILS # BLD AUTO: 8.8 10E9/L (ref 1.6–8.3)
NEUTROPHILS NFR BLD AUTO: 66 %
PLATELET # BLD AUTO: 306 10E9/L (ref 150–450)
PLATELET # BLD EST: ABNORMAL 10*3/UL
POTASSIUM SERPL-SCNC: 4.3 MMOL/L (ref 3.4–5.3)
RBC # BLD AUTO: 3.49 10E12/L (ref 4.4–5.9)
RBC MORPH BLD: ABNORMAL
SODIUM SERPL-SCNC: 138 MMOL/L (ref 133–144)
WBC # BLD AUTO: 13.3 10E9/L (ref 4–11)

## 2020-07-10 PROCEDURE — 40000556 ZZH STATISTIC PERIPHERAL IV START W US GUIDANCE

## 2020-07-10 PROCEDURE — 00000146 ZZHCL STATISTIC GLUCOSE BY METER IP

## 2020-07-10 PROCEDURE — 25000128 H RX IP 250 OP 636: Performed by: INTERNAL MEDICINE

## 2020-07-10 PROCEDURE — 25000125 ZZHC RX 250: Performed by: INTERNAL MEDICINE

## 2020-07-10 PROCEDURE — 85025 COMPLETE CBC W/AUTO DIFF WBC: CPT | Performed by: INTERNAL MEDICINE

## 2020-07-10 PROCEDURE — 99233 SBSQ HOSP IP/OBS HIGH 50: CPT | Performed by: INTERNAL MEDICINE

## 2020-07-10 PROCEDURE — 12000000 ZZH R&B MED SURG/OB

## 2020-07-10 PROCEDURE — 36415 COLL VENOUS BLD VENIPUNCTURE: CPT | Performed by: INTERNAL MEDICINE

## 2020-07-10 PROCEDURE — 80048 BASIC METABOLIC PNL TOTAL CA: CPT | Performed by: INTERNAL MEDICINE

## 2020-07-10 RX ORDER — CEFTRIAXONE 2 G/1
2 INJECTION, POWDER, FOR SOLUTION INTRAMUSCULAR; INTRAVENOUS EVERY 24 HOURS
Status: DISCONTINUED | OUTPATIENT
Start: 2020-07-10 | End: 2020-07-15 | Stop reason: HOSPADM

## 2020-07-10 RX ADMIN — MICONAZOLE NITRATE: 20 POWDER TOPICAL at 22:03

## 2020-07-10 RX ADMIN — CEFAZOLIN SODIUM 2 G: 2 INJECTION, SOLUTION INTRAVENOUS at 05:47

## 2020-07-10 RX ADMIN — CEFAZOLIN SODIUM 2 G: 2 INJECTION, SOLUTION INTRAVENOUS at 14:15

## 2020-07-10 RX ADMIN — ENOXAPARIN SODIUM 40 MG: 40 INJECTION SUBCUTANEOUS at 10:04

## 2020-07-10 RX ADMIN — CLINDAMYCIN IN 5 PERCENT DEXTROSE 900 MG: 18 INJECTION, SOLUTION INTRAVENOUS at 06:47

## 2020-07-10 RX ADMIN — MICONAZOLE NITRATE: 20 POWDER TOPICAL at 10:04

## 2020-07-10 RX ADMIN — CLINDAMYCIN IN 5 PERCENT DEXTROSE 900 MG: 18 INJECTION, SOLUTION INTRAVENOUS at 15:00

## 2020-07-10 RX ADMIN — ENOXAPARIN SODIUM 40 MG: 40 INJECTION SUBCUTANEOUS at 22:03

## 2020-07-10 RX ADMIN — SALINE NASAL SPRAY 1 SPRAY: 1.5 SOLUTION NASAL at 06:48

## 2020-07-10 RX ADMIN — SILVER SULFADIAZINE: 10 CREAM TOPICAL at 10:04

## 2020-07-10 RX ADMIN — CEFTRIAXONE 2 G: 2 INJECTION, POWDER, FOR SOLUTION INTRAMUSCULAR; INTRAVENOUS at 17:00

## 2020-07-10 RX ADMIN — SALINE NASAL SPRAY 1 SPRAY: 1.5 SOLUTION NASAL at 22:03

## 2020-07-10 ASSESSMENT — ACTIVITIES OF DAILY LIVING (ADL)
ADLS_ACUITY_SCORE: 14

## 2020-07-10 ASSESSMENT — MIFFLIN-ST. JEOR: SCORE: 3031.42

## 2020-07-10 NOTE — PROGRESS NOTES
"SPIRITUAL HEALTH SERVICES: Tele-Encounter  Patient Location: [facility]  Spoke with: Patient    Referral Source: Called pt per length of stay     DATA:  Pt sounded in generally good spirits and stated he is \"getting better.\" He anticipates being here for several more days, but was optimistic it would all work out saying, \"it shouldn't be too bad.\"     PLAN:  Informed pt how to contact [facility] if need arises in the future. [facility] remains available.         Martin Acuna Intern      ______________________________    Type of service:  Telephone Visit     has received verbal consent for a TelephoneVisit from the patient? YES    Distance Provider Location: designated [place] office or home office (secure setting)    Mode of Communication: telephone (via [employer] phone or [employer] tele-call-number ([phone]))  "

## 2020-07-10 NOTE — PLAN OF CARE
PT: Have received orders for lymphedema therapy.  Patient admitted for sepsis with chronic bilateral LE edema with wounds.  LEs visualized this date. Pt with continued redness extending nearly the entire length of the L LE with multiple wounds requiring wound care. Per discussion with RN, will continue to hold lymphedema evaluation at this time to allow for medical management and wound management prior to initiating lymphedema wrapping. Ultimately, pt will require long term lymphedema management once wounds/infection improved.

## 2020-07-10 NOTE — PROGRESS NOTES
"[facility]  Infectious Disease Progress Note          Assessment and Plan:   IMPRESSION:   1.  A 59-year-old male with acute sepsis, severe left leg cellulitis with underlying lymphedema, very likely strep, but multiple organisms possible in this setting.   2.  Severe venous stasis and lymphedema including recurrent bilateral leg wounds, major blistering areas currently probably related to streptococcal cellulitis.   3.  Recurrent cellulitis in the past, although no prior hospitalizations, manageable as an outpatient prior to this.   4.  Morbid obesity.   5.  Acute renal failure related to sepsis.   6.  COVID-19 rule out, but given this alternative diagnosis despite some suggestive symptoms, unlikely COVID-19.      RECOMMENDATIONS:   1.  Discontinue COVID isolation.   2.  Continue antibiotics, likely strep to ceftriaxone  3.  Neg blood cultures.  Significant rigors and sepsis, so quite possible bacteremic at least transiently.   4.  Leg elevation as much as possible. Could do wraps also if he can tolerate  5.  Wound care as needed. Wd cx with 5 orgs doubt this is sig relative to leg cellulitis, both strep and staph aureus in cxs          Interval History:   no new complaints and doing well; no cp, sob, n/v/d, or abd pain. Pain and leg better T down BC neg leg wd cx staph 2 GNR, strep etc WBc 13 K              Medications:       cefTRIAXone  2 g Intravenous Q24H     enoxaparin ANTICOAGULANT  40 mg Subcutaneous Q12H     insulin aspart  1-7 Units Subcutaneous TID AC     miconazole   Topical BID     silver sulfADIAZINE   Topical Daily     sodium chloride (PF)  3 mL Intracatheter Q8H                  Physical Exam:   Blood pressure 125/54, pulse 72, temperature 98.7  F (37.1  C), temperature source Temporal, resp. rate 16, height 1.943 m (6' 4.5\"), weight (!) 209.7 kg (462 lb 4.8 oz), SpO2 94 %.  Wt Readings from Last 2 Encounters:   07/10/20 (!) 209.7 kg (462 lb 4.8 oz)   03/04/20 (!) 194.9 kg " (429 lb 9.6 oz)     Vital Signs with Ranges  Temp:  [97.6  F (36.4  C)-99  F (37.2  C)] 98.7  F (37.1  C)  Heart Rate:  [74-86] 82  Resp:  [16-18] 16  BP: (116-140)/(46-54) 125/54  SpO2:  [94 %-99 %] 94 %    Constitutional: Awake, alert, cooperative, no apparent distress looks well   Lungs: Clear to auscultation bilaterally, no crackles or wheezing   Cardiovascular: Regular rate and rhythm, normal S1 and S2, and no murmur noted   Abdomen: Normal bowel sounds, soft, non-distended, non-tender   Skin: No rashes, no cyanosis, major edema L leg a bit better further   Other:           Data:   All microbiology laboratory data reviewed.  Recent Labs   Lab Test 07/10/20  0706 07/09/20  0710 07/07/20  0808   WBC 13.3* 13.2* 15.5*   HGB 10.5* 10.3* 11.3*   HCT 32.8* 32.8* 34.8*   MCV 94 94 93    294 263     Recent Labs   Lab Test 07/10/20  0706 07/09/20  0710 07/08/20  0634   CR 0.84 0.90 1.06     No lab results found.  Recent Labs   Lab Test 07/07/20  0930 07/06/20  1434 07/06/20  1340 07/03/18  0945   CULT Light growth  Lactose fermenting gram negative rods  Susceptibility testing in progress  *  Moderate growth  Staphylococcus aureus  *  Light growth  Beta hemolytic Streptococcus group G  Clindamycin susceptibilities in progress  7/10/20  *  Light growth  Acinetobacter baumannii complex  Susceptibility testing in progress  *  Light growth  Stenotrophomonas maltophilia  Susceptibility testing in progress  *  Heavy growth  Corynebacterium striatum  Identification obtained by MALDI-TOF mass spectrometry research use only database. Test   characteristics determined and verified by the Infectious Diseases Diagnostic Laboratory   ([facility]) [place], [place].  Susceptibility testing not routinely done  *  Culture in progress  Light growth  Staphylococcus aureus  Susceptibility testing done on previous specimen  *  Light growth  Normal skin alfonso   No growth after 4 days No growth after 4 days Heavy growth  Escherichia  coli  *  Heavy growth  Acinetobacter baumannii complex  *  Heavy growth  Staphylococcus aureus  *  Light growth  Beta hemolytic Streptococcus group G  *  Heavy growth  Corynebacterium striatum  Identification obtained by MALDI-TOF mass spectrometry research use only database. Test   characteristics determined and verified by the Infectious Diseases Diagnostic Laboratory   ([facility]) [place], [place].  Susceptibility testing not routinely done  *  Heavy growth  Enterococcus faecalis  *

## 2020-07-10 NOTE — PLAN OF CARE
Alert and oriented. VSS. Afebrile. RA. Tolerating regular diet, monitoring BG levels. Transfers with Ax1, gait belt, and walker. Wound cares done to BLE per [facility] orders. Edema to BLE, elevated on foam wedge pillows. Pt sits on foam seat up in recliner and has Pulsate mattress for bed. Micatin applied to abdominal fold and right breast area. Voiding in adequate amounts. IV Lasix x1. On IV Cleocin and Ancef. Denies pain. Will continue to monitor. MD said pt will probably be here 2-3 more days.

## 2020-07-10 NOTE — PROGRESS NOTES
[facility]  Hospitalist Progress Note  Venkata Mcmahon MD 07/10/2020    Reason for Stay (Diagnosis): sepsis rleated to left LE cellulitis         Assessment and Plan:      Summary of Stay: Da Mustafa is a 59 year old male with prior medical history is significant for severe obesity (body mass index approximately 55) and related complications including glucose intolerance, chronic lower extremity edema, hypertension and possibly sleep apnea currently not treated due to intolerance of his mask who came to attention on 7/6/2020 with malaise and fevers with obvious left lower extremity cellulitis as well as acute kidney injury with creatinine of 3.2 from baseline of 0.9.  He was started on Ancef and clindamycin and has gradually clinically improved though multiple more days are expected here in the hospital.  Creatinine is back to baseline at 0.9.  Remains admitted for ongoing IV diuresis as well.          Problem List:   1. Left lower extremity cellulitis in the setting of chronic edema.  I suspect the chronic edema is due to venous insufficiency and possibly untreated sleep apnea.  --Continue Ancef and clindamycin  --Diurese as tolerated.  Currently on Lasix 40 mg twice daily.  --Needs long-term lymphedema measures once acute infection is treated.    2. Sepsis.  Dramatically improved.  Hold further IV fluids given lymphedema.    3. Acute kidney injury with baseline creatinine 0.9 with initial creatinine 3.2.  Resolved    4. Hypertension.    5. Severe Obesity.  Following with the bariatric clinic in a diet.  Lost over 40 pounds.       DVT Prophylaxis: Heparin SQ  Code Status: Full Code  Discharge Dispo: likely return to home, though will need to eval for mobility needs.  Estimated Disch Date / # of Days until Disch: likely more than 3 days.        Interval History (Subjective):      Tolerating diuresis, awaiting daily weights.  Unfortunately could not tolerate CPAP last night.  Otherwise  "feeling okay, legs still feel tight and swollen                    Physical Exam:      Last Vital Signs:  /46 (BP Location: Right arm)   Pulse 72   Temp 98.5  F (36.9  C) (Temporal)   Resp 16   Ht 1.943 m (6' 4.5\")   Wt (!) 209.1 kg (461 lb)   SpO2 98%   BMI 55.38 kg/m      I/O last 3 completed shifts:  In: 1361 [P.O.:800; I.V.:561]  Out: 2450 [Urine:2450]    Constitutional: Awake, alert, cooperative, no apparent distress   Respiratory: Clear to auscultation bilaterally, no crackles or wheezing   Cardiovascular: Regular rate and rhythm, normal S1 and S2, and no murmur noted   Abdomen: Normal bowel sounds, soft, non-distended, non-tender   Skin: bilat LE edema.  Right anterior shin with a circular lesion of about 3 cm diameter is denuded.     The left leg is hot and inflamed appearing with generalized swelling and areas of slough along with many areas of apparent vesicles which now seem to be overall defervesced .  Overall slightly improved.   Neuro: Alert and oriented x3.   Extremities:    Other(s):        All other systems: Negative          Medications:      All current medications were reviewed with changes reflected in problem list.         Data:      All new lab and imaging data was reviewed.   Labs/Imaging:  Results for orders placed or performed during the hospital encounter of 07/06/20 (from the past 24 hour(s))   Glucose by meter   Result Value Ref Range    Glucose 76 70 - 99 mg/dL   Glucose by meter   Result Value Ref Range    Glucose 107 (H) 70 - 99 mg/dL   Basic metabolic panel   Result Value Ref Range    Sodium 138 133 - 144 mmol/L    Potassium 4.3 3.4 - 5.3 mmol/L    Chloride 106 94 - 109 mmol/L    Carbon Dioxide 29 20 - 32 mmol/L    Anion Gap 3 3 - 14 mmol/L    Glucose 109 (H) 70 - 99 mg/dL    Urea Nitrogen 11 7 - 30 mg/dL    Creatinine 0.84 0.66 - 1.25 mg/dL    GFR Estimate >90 >60 mL/min/[1.73_m2]    GFR Estimate If Black >90 >60 mL/min/[1.73_m2]    Calcium 7.9 (L) 8.5 - 10.1 mg/dL "   CBC with platelets differential   Result Value Ref Range    WBC 13.3 (H) 4.0 - 11.0 10e9/L    RBC Count 3.49 (L) 4.4 - 5.9 10e12/L    Hemoglobin 10.5 (L) 13.3 - 17.7 g/dL    Hematocrit 32.8 (L) 40.0 - 53.0 %    MCV 94 78 - 100 fl    MCH 30.1 26.5 - 33.0 pg    MCHC 32.0 31.5 - 36.5 g/dL    RDW 12.8 10.0 - 15.0 %    Platelet Count 306 150 - 450 10e9/L    Diff Method Manual Differential     % Neutrophils 66.0 %    % Lymphocytes 15.0 %    % Monocytes 11.0 %    % Eosinophils 5.0 %    % Basophils 0.0 %    % Metamyelocytes 3.0 %    Absolute Neutrophil 8.8 (H) 1.6 - 8.3 10e9/L    Absolute Lymphocytes 2.0 0.8 - 5.3 10e9/L    Absolute Monocytes 1.5 (H) 0.0 - 1.3 10e9/L    Absolute Eosinophils 0.7 0.0 - 0.7 10e9/L    Absolute Basophils 0.0 0.0 - 0.2 10e9/L    Absolute Metamyelocytes 0.4 (H) 0 10e9/L    RBC Morphology Consistent with reported results     Platelet Estimate       Automated count confirmed.  Platelet morphology is normal.   Glucose by meter   Result Value Ref Range    Glucose 87 70 - 99 mg/dL

## 2020-07-10 NOTE — PROGRESS NOTES
Set pt up on our CPAP machine and had an in depth conversation about wearing CPAP throughout the night.  Pt was also educated on the indications and benefits for wearing it and initially agreed to wear it and then ultimately refused.    Will continue to educate on this and encourage him to wear it at bedtime.    Josie Lewis, RT on 7/10/2020 at 3:43 AM

## 2020-07-10 NOTE — PLAN OF CARE
A/O, VSS, Tolerating regular diet.  Transfers with Ax1, gait belt, and walker. Dressings to legs CDI WOC following. Edema to BLE, elevated on foam wedge pillows. Has wounds on buttocks. Pt sits on foam seat up in recliner and has Pulsate mattress for bed. Voiding in adequate amounts. On IV lasix.  On IV Cleocin and Ancef. Tolerated cpap for 2 short periods of time overnight.  Denies pain.

## 2020-07-11 ENCOUNTER — APPOINTMENT (OUTPATIENT)
Dept: PHYSICAL THERAPY | Facility: CLINIC | Age: 60
DRG: 872 | End: 2020-07-11
Attending: INTERNAL MEDICINE
Payer: COMMERCIAL

## 2020-07-11 LAB
ANION GAP SERPL CALCULATED.3IONS-SCNC: 1 MMOL/L (ref 3–14)
BACTERIA SPEC CULT: ABNORMAL
BASOPHILS # BLD AUTO: 0 10E9/L (ref 0–0.2)
BASOPHILS NFR BLD AUTO: 0 %
BUN SERPL-MCNC: 10 MG/DL (ref 7–30)
CALCIUM SERPL-MCNC: 7.8 MG/DL (ref 8.5–10.1)
CHLORIDE SERPL-SCNC: 108 MMOL/L (ref 94–109)
CO2 SERPL-SCNC: 29 MMOL/L (ref 20–32)
CREAT SERPL-MCNC: 0.72 MG/DL (ref 0.66–1.25)
DIFFERENTIAL METHOD BLD: ABNORMAL
EOSINOPHIL # BLD AUTO: 0.2 10E9/L (ref 0–0.7)
EOSINOPHIL NFR BLD AUTO: 2 %
ERYTHROCYTE [DISTWIDTH] IN BLOOD BY AUTOMATED COUNT: 12.9 % (ref 10–15)
GFR SERPL CREATININE-BSD FRML MDRD: >90 ML/MIN/{1.73_M2}
GLUCOSE BLDC GLUCOMTR-MCNC: 113 MG/DL (ref 70–99)
GLUCOSE BLDC GLUCOMTR-MCNC: 87 MG/DL (ref 70–99)
GLUCOSE BLDC GLUCOMTR-MCNC: 90 MG/DL (ref 70–99)
GLUCOSE BLDC GLUCOMTR-MCNC: 96 MG/DL (ref 70–99)
GLUCOSE SERPL-MCNC: 104 MG/DL (ref 70–99)
HCT VFR BLD AUTO: 32.6 % (ref 40–53)
HGB BLD-MCNC: 10.4 G/DL (ref 13.3–17.7)
LYMPHOCYTES # BLD AUTO: 2 10E9/L (ref 0.8–5.3)
LYMPHOCYTES NFR BLD AUTO: 18 %
Lab: ABNORMAL
MCH RBC QN AUTO: 30 PG (ref 26.5–33)
MCHC RBC AUTO-ENTMCNC: 31.9 G/DL (ref 31.5–36.5)
MCV RBC AUTO: 94 FL (ref 78–100)
METAMYELOCYTES # BLD: 0.5 10E9/L
METAMYELOCYTES NFR BLD MANUAL: 4 %
MONOCYTES # BLD AUTO: 1.4 10E9/L (ref 0–1.3)
MONOCYTES NFR BLD AUTO: 12 %
MYELOCYTES # BLD: 0.2 10E9/L
MYELOCYTES NFR BLD MANUAL: 2 %
NEUTROPHILS # BLD AUTO: 7 10E9/L (ref 1.6–8.3)
NEUTROPHILS NFR BLD AUTO: 62 %
PLATELET # BLD AUTO: 289 10E9/L (ref 150–450)
PLATELET # BLD EST: ABNORMAL 10*3/UL
POTASSIUM SERPL-SCNC: 4.3 MMOL/L (ref 3.4–5.3)
RBC # BLD AUTO: 3.47 10E12/L (ref 4.4–5.9)
RBC MORPH BLD: ABNORMAL
SODIUM SERPL-SCNC: 138 MMOL/L (ref 133–144)
SPECIMEN SOURCE: ABNORMAL
WBC # BLD AUTO: 11.3 10E9/L (ref 4–11)

## 2020-07-11 PROCEDURE — 00000146 ZZHCL STATISTIC GLUCOSE BY METER IP

## 2020-07-11 PROCEDURE — 25000128 H RX IP 250 OP 636: Performed by: INTERNAL MEDICINE

## 2020-07-11 PROCEDURE — 40000275 ZZH STATISTIC RCP TIME EA 10 MIN

## 2020-07-11 PROCEDURE — 85025 COMPLETE CBC W/AUTO DIFF WBC: CPT | Performed by: INTERNAL MEDICINE

## 2020-07-11 PROCEDURE — 97530 THERAPEUTIC ACTIVITIES: CPT | Mod: GP

## 2020-07-11 PROCEDURE — 80048 BASIC METABOLIC PNL TOTAL CA: CPT | Performed by: INTERNAL MEDICINE

## 2020-07-11 PROCEDURE — 99233 SBSQ HOSP IP/OBS HIGH 50: CPT | Performed by: INTERNAL MEDICINE

## 2020-07-11 PROCEDURE — 36415 COLL VENOUS BLD VENIPUNCTURE: CPT | Performed by: INTERNAL MEDICINE

## 2020-07-11 PROCEDURE — 94660 CPAP INITIATION&MGMT: CPT

## 2020-07-11 PROCEDURE — 12000000 ZZH R&B MED SURG/OB

## 2020-07-11 PROCEDURE — 97161 PT EVAL LOW COMPLEX 20 MIN: CPT | Mod: GP

## 2020-07-11 RX ORDER — FUROSEMIDE 10 MG/ML
40 INJECTION INTRAMUSCULAR; INTRAVENOUS 3 TIMES DAILY
Status: COMPLETED | OUTPATIENT
Start: 2020-07-11 | End: 2020-07-12

## 2020-07-11 RX ADMIN — MICONAZOLE NITRATE: 20 POWDER TOPICAL at 21:33

## 2020-07-11 RX ADMIN — ENOXAPARIN SODIUM 40 MG: 40 INJECTION SUBCUTANEOUS at 21:33

## 2020-07-11 RX ADMIN — SALINE NASAL SPRAY 1 SPRAY: 1.5 SOLUTION NASAL at 00:32

## 2020-07-11 RX ADMIN — FUROSEMIDE 40 MG: 10 INJECTION, SOLUTION INTRAMUSCULAR; INTRAVENOUS at 09:35

## 2020-07-11 RX ADMIN — MICONAZOLE NITRATE: 20 POWDER TOPICAL at 09:35

## 2020-07-11 RX ADMIN — ENOXAPARIN SODIUM 40 MG: 40 INJECTION SUBCUTANEOUS at 09:35

## 2020-07-11 RX ADMIN — SILVER SULFADIAZINE: 10 CREAM TOPICAL at 10:44

## 2020-07-11 RX ADMIN — FUROSEMIDE 40 MG: 10 INJECTION, SOLUTION INTRAMUSCULAR; INTRAVENOUS at 13:55

## 2020-07-11 RX ADMIN — CEFTRIAXONE 2 G: 2 INJECTION, POWDER, FOR SOLUTION INTRAMUSCULAR; INTRAVENOUS at 16:55

## 2020-07-11 RX ADMIN — FUROSEMIDE 40 MG: 10 INJECTION, SOLUTION INTRAMUSCULAR; INTRAVENOUS at 19:57

## 2020-07-11 ASSESSMENT — ACTIVITIES OF DAILY LIVING (ADL)
ADLS_ACUITY_SCORE: 14
ADLS_ACUITY_SCORE: 17
ADLS_ACUITY_SCORE: 14
ADLS_ACUITY_SCORE: 14

## 2020-07-11 ASSESSMENT — MIFFLIN-ST. JEOR
SCORE: 3028.24
SCORE: 3033.23

## 2020-07-11 NOTE — PLAN OF CARE
Patient resting comfortably. Refused CPAP overnight. Vital signs stable. Alert/oriented x4. Denies pain/discomfort. Up with assist of 1 and walker. Dressings are clean, dry and intact to bilateral lower extremities. Plan for rocephin, lasix and lymphedema wraps.

## 2020-07-11 NOTE — PROGRESS NOTES
"CLINICAL NUTRITION SERVICES - REASSESSMENT NOTE      Malnutrition: (7/11/2020)  % Weight Loss:  None noted --> during admit  % Intake:  No decreased intake noted  Subcutaneous Fat Loss:  Orbital region mild depletion --> will not use given only one indicator   Muscle Loss:  Temporal region moderate depletion --> will not use given only one indicator, some likely masked by adipose tissue  Fluid Retention:  Mild - Moderate (leg, knee, ankle and foot) --> not using as indicator as not largely impacting true wt trending     Malnutrition Diagnosis: Patient does not meet two of the above criteria necessary for diagnosing malnutrition       EVALUATION OF PROGRESS TOWARD GOALS   Diet:  Regular    Intake/Tolerance:  Consistently consuming 100% of meals per flowsheet review.  Consistently ordering meals TID.      - WOCN following for BLE wound \"due to mixed etiology: lymphedema, suspect PVD\".    - Ongoing diuresis and lymphedema treatment.  - Respiratory team following for CPAP at night.       ASSESSED NUTRITION NEEDS PER APPROVED PRACTICE GUIDELINES:     Dosing Weight 209.1 kg   Estimated Energy Needs: 9529-8189 (15-20 kcal/kg), MSJ = 3032 kcal   Justification: maintenance  Estimated Protein Needs: >/=209 grams protein (>/=1 g pro/Kg)  Justification: maintenance  Estimated Fluid Needs: per MD       NEW FINDINGS:   - Medications reviewed including:     cefTRIAXone  2 g Intravenous Q24H     enoxaparin ANTICOAGULANT  40 mg Subcutaneous Q12H     furosemide  40 mg Intravenous TID     insulin aspart  1-7 Units Subcutaneous TID AC     miconazole   Topical BID     silver sulfADIAZINE   Topical Daily     sodium chloride (PF)  3 mL Intracatheter Q8H         - Labs reviewed including:  Electrolytes  Potassium (mmol/L)   Date Value   07/11/2020 4.3   07/10/2020 4.3   07/09/2020 3.9    Blood Glucose  Glucose (mg/dL)   Date Value   07/11/2020 104 (H)   07/10/2020 109 (H)   07/09/2020 122 (H)   07/08/2020 117 (H)   07/07/2020 108 (H) "     Hemoglobin A1C (%)   Date Value   07/07/2020 5.5   07/03/2018 5.5   05/05/2011 5.7   02/09/2010 5.6   02/27/2006 6.0    Inflammatory Markers  CRP Cardiac Risk (mg/L)   Date Value   08/05/2013 1.9   07/11/2012 2.8   05/05/2011 1.5     CRP Inflammation (mg/L)   Date Value   07/07/2020 181.0 (H)     WBC (10e9/L)   Date Value   07/11/2020 11.3 (H)   07/10/2020 13.3 (H)   07/09/2020 13.2 (H)     Albumin (g/dL)   Date Value   07/06/2020 2.1 (L)   04/24/2019 3.7   12/27/2017 3.5      Sodium (mmol/L)   Date Value   07/11/2020 138   07/10/2020 138   07/09/2020 139    Renal  Urea Nitrogen (mg/dL)   Date Value   07/11/2020 10   07/10/2020 11   07/09/2020 17     Creatinine (mg/dL)   Date Value   07/11/2020 0.72   07/10/2020 0.84   07/09/2020 0.90     Additional  Triglycerides (mg/dL)   Date Value   04/24/2019 90   12/27/2017 146   09/06/2016 86     Ketones Urine (mg/dL)   Date Value   07/07/2020 Negative        -  Weight trending reviewed.  Relatively stable throughout admit:  Vitals:    07/06/20 1436 07/06/20 1917 07/10/20 1032 07/11/20 0613   Weight: (!) 206.8 kg (456 lb) (!) 209.1 kg (461 lb) (!) 209.7 kg (462 lb 4.8 oz) (!) 209.4 kg (461 lb 9.6 oz)    07/11/20 0829   Weight: (!) 209.9 kg (462 lb 11.2 oz)     - Stooling patterns reviewed.      Previous Goals:   Pt to consume >/=75% of meals ordered TID   Evaluation: Met    Previous Nutrition Diagnosis:   Predicted inadequate nutrient intake related to potential for decreased appetite, as was PTA with infection   Evaluation: No change      CURRENT NUTRITION DIAGNOSIS  Predicted suboptimal nutrient intake (energy/protein) related to potential for decline in PO intakes pending LOS.    INTERVENTIONS  Recommendations / Nutrition Prescription  Continue diet as ordered.    Implementation  Nutrition Education: No education needs assessed at this time.     Goals  Patient to consume at least 75% of meals TID.       MONITORING AND EVALUATION:  Progress towards goals will be  monitored and evaluated per protocol and Practice Guidelines      Flores Shay RDN, LD  Clinical Dietitian  3rd floor/ICU: [phone]  All other floors: [phone]  Weekend/holiday: [phone]     Normal for race

## 2020-07-11 NOTE — PROGRESS NOTES
07/11/20 1426   Quick Adds   Quick Adds Edema Eval   Type of Visit Initial PT Evaluation   Living Environment   Lives With spouse   Self-Care   Usual Activity Tolerance moderate   Current Activity Tolerance moderate   Equipment Currently Used at Home cane, straight   Activity/Exercise/Self-Care Comment Patient mowing grass approx 2 days before admission   Functional Level Prior   Ambulation 1-->assistive equipment   Transferring 1-->assistive equipment   Which of the above functional risks had a recent onset or change? ambulation   Prior Functional Level Comment PT: Patient uses cane with mobility at baseline.   General Information   Onset of Illness/Injury or Date of Surgery - Date 07/06/20   Referring Physician Sandeep Lancaster MD    Patient/Family Goals Statement decrease edema   Pertinent History of Current Problem (include personal factors and/or comorbidities that impact the POC) Patient with PMH including severe obesity and related complications including glucose intolerance, chronic lower extremity edema, HTN and possible sleep apnea now admitted for sepsis secondary to left LE cellulitis.     Edema General Information   Affected Body Part(s) Left LE;Right LE   Edema Etiology Chronic Venous Insfficiency   Edema Precautions Acute infection   General Comments/Previous Edema Treatment/Edema Equipment Patient reports long history with lymphedema.  Patient has both lymphedema wraps and custom velcro garments (foot and calf).  Patient states that he is unable to don wraps or garments independently; wife assists.  Patient states that he is inconsistent with wearing garments until he notices increased edema and redness at LE.  Patient was wearing right velcro calf garment upon admission.     Edema Examination/Assessment   Skin Condition Pitting;Dryness   Skin Condition Comments multple open wounds, eurythems   Pitting Assessment Patient presents with +3 edema in left foot and calf; left LE with  "continued redness and wounds.  Patient presents with +2 at right calf/ankle and +1 at right foot.    Cognitive Status Examination   Orientation orientation to person, place and time   Pain Assessment   Patient Currently in Pain Yes, see Vital Sign flowsheet   Integumentary/Edema   Integumentary/Edema Comments please see above   Range of Motion (ROM)   ROM Comment decreased bilateral knee ROM   Strength   Strength Comments decreased global strength secodnary to limited activity due to LE pain   Bed Mobility   Bed Mobility Comments patient has not been sleeping in bed   Transfer Skills   Transfer Comments SBA   Gait   Gait Comments SBA with 2WW   General Therapy Interventions   Planned Therapy Interventions gait training;manual therapy;transfer training;home program guidelines;progressive activity/exercise   Clinical Impression   Criteria for Skilled Therapeutic Intervention yes, treatment indicated   PT Diagnosis chronic bilateral edema   Edema: Patient Presentation Edema;Stage 3 Lymphedema;Wounds/Ulcers   Influenced by the following impairments edema/wounds   Functional limitations due to impairments difficulties with gait/transfers   Clinical Presentation Stable/Uncomplicated   Clinical Presentation Rationale complex pmh, stable presentation, good social support   Clinical Decision Making (Complexity) Low complexity   Therapy Frequency Daily   Predicted Duration of Therapy Intervention (days/wks) 5 days   Anticipated Discharge Disposition Home with Outpatient Therapy  (outpatient lymphedema therapy)   Risk & Benefits of therapy have been explained Yes   Patient, Family & other staff in agreement with plan of care Yes   [facility] [employer] TM \"6 Clicks\"   2016, Trustees of [facility], under license to [employer].  All rights reserved.   6 Clicks Short Forms Basic Mobility Inpatient Short Form   [facility] AM-PAC  \"6 Clicks\" V.2 Basic Mobility Inpatient Short Form   1. Turning from your back to " your side while in a flat bed without using bedrails? 4 - None   2. Moving from lying on your back to sitting on the side of a flat bed without using bedrails? 4 - None   3. Moving to and from a bed to a chair (including a wheelchair)? 4 - None   4. Standing up from a chair using your arms (e.g., wheelchair, or bedside chair)? 4 - None   5. To walk in hospital room? 3 - A Little   6. Climbing 3-5 steps with a railing? 3 - A Little   Basic Mobility Raw Score (Score out of 24.Lower scores equate to lower levels of function) 22   Total Evaluation Time   Total Evaluation Time (Minutes) 5

## 2020-07-11 NOTE — PROGRESS NOTES
[facility]  Infectious Disease Progress Note          Assessment and Plan:   IMPRESSION:   1.  A 59-year-old male with acute sepsis, severe left leg cellulitis with underlying lymphedema, very likely strep, but multiple organisms possible in this setting.   2.  Severe venous stasis and lymphedema including recurrent bilateral leg wounds, major blistering areas currently probably related to streptococcal cellulitis.   3.  Recurrent cellulitis in the past, although no prior hospitalizations, manageable as an outpatient prior to this.   4.  Morbid obesity.   5.  Acute renal failure related to sepsis.   6.  COVID-19 rule out, but given this alternative diagnosis despite some suggestive symptoms, unlikely COVID-19.      RECOMMENDATIONS:   1.  Discontinued COVID isolation.   2.  Continue antibiotics, likely strep,  to ceftriaxone atleast thru tomorrow dose  3.  Neg blood cultures.  Significant rigors and sepsis, so quite possible bacteremic at least transiently.   4.  Leg elevation as much as possible. Could do wraps also if he can tolerate  5.  Wound care as needed. Wd cx with 5 orgs doubt this is sig relative to leg cellulitis, both strep and staph aureus in cxs    Ceftriaxone thru tomorrow dose, then maybe augmentin for 10 days and home to include continued leg elevation, still impressive edema and skin changes        Interval History:   no new complaints and doing well; no cp, sob, n/v/d, or abd pain. Pain and leg better T down BC neg leg wd cx staph 2 GNR, strep etc WBc 11 K              Medications:       cefTRIAXone  2 g Intravenous Q24H     enoxaparin ANTICOAGULANT  40 mg Subcutaneous Q12H     furosemide  40 mg Intravenous TID     insulin aspart  1-7 Units Subcutaneous TID AC     miconazole   Topical BID     silver sulfADIAZINE   Topical Daily     sodium chloride (PF)  3 mL Intracatheter Q8H                  Physical Exam:   Blood pressure (!) 140/63, pulse 77, temperature 98.5  F (36.9  " C), temperature source Temporal, resp. rate 16, height 1.943 m (6' 4.5\"), weight (!) 209.9 kg (462 lb 11.2 oz), SpO2 93 %.  Wt Readings from Last 2 Encounters:   07/11/20 (!) 209.9 kg (462 lb 11.2 oz)   03/04/20 (!) 194.9 kg (429 lb 9.6 oz)     Vital Signs with Ranges  Temp:  [98.1  F (36.7  C)-99.5  F (37.5  C)] 98.5  F (36.9  C)  Pulse:  [77] 77  Heart Rate:  [74-85] 81  Resp:  [16-20] 16  BP: (105-140)/(46-63) 140/63  SpO2:  [93 %-98 %] 93 %    Constitutional: Awake, alert, cooperative, no apparent distress looks well   Lungs: Clear to auscultation bilaterally, no crackles or wheezing   Cardiovascular: Regular rate and rhythm, normal S1 and S2, and no murmur noted   Abdomen: Normal bowel sounds, soft, non-distended, non-tender   Skin: No rashes, no cyanosis, major edema L leg a bit better further   Other:           Data:   All microbiology laboratory data reviewed.  Recent Labs   Lab Test 07/11/20  0648 07/10/20  0706 07/09/20  0710   WBC 11.3* 13.3* 13.2*   HGB 10.4* 10.5* 10.3*   HCT 32.6* 32.8* 32.8*   MCV 94 94 94    306 294     Recent Labs   Lab Test 07/11/20  0648 07/10/20  0706 07/09/20  0710   CR 0.72 0.84 0.90     No lab results found.  Recent Labs   Lab Test 07/07/20  0930 07/06/20  1434 07/06/20  1340 07/03/18  0945   CULT Light growth  Acinetobacter baumannii complex  *  Moderate growth  Staphylococcus aureus  *  Light growth  Beta hemolytic Streptococcus group G  *  Light growth  Strain 2  Acinetobacter baumannii complex  *  Light growth  Stenotrophomonas maltophilia  *  Heavy growth  Corynebacterium striatum  Identification obtained by MALDI-TOF mass spectrometry research use only database. Test   characteristics determined and verified by the [facility] Diagnostic Laboratory   ([facility]) [place], [place].  Susceptibility testing not routinely done  *  Light growth  Staphylococcus aureus  Susceptibility testing done on previous specimen  *  Light growth  Normal skin alfonso   No " growth after 4 days No growth after 4 days Heavy growth  Escherichia coli  *  Heavy growth  Acinetobacter baumannii complex  *  Heavy growth  Staphylococcus aureus  *  Light growth  Beta hemolytic Streptococcus group G  *  Heavy growth  Corynebacterium striatum  Identification obtained by MALDI-TOF mass spectrometry research use only database. Test   characteristics determined and verified by the Infectious Diseases Diagnostic Laboratory   ([facility]) [place], [place].  Susceptibility testing not routinely done  *  Heavy growth  Enterococcus faecalis  *

## 2020-07-11 NOTE — PLAN OF CARE
Pt ambulating to the bathroom with A1, walker GB. Pt refusing to sleep or lay on his bed as he states that he has a difficult time breathing and controlling his occasional nose bleeds. Pt educated about the advantage of sleeping/resting on an APM mattress. Pt expressed understanding but he still chose not to. Pt currently on C-pap after lots of encouragement. Left leg marked, new order for ceftriaxone today. First dose administered, no reaction noted. Will keep monitoring.

## 2020-07-11 NOTE — PROGRESS NOTES
Patient was placed on autoset CPAP on room air for sleep apnea at round midnight. After few minutes, pt removed the mask and refused CPAP. Pt is currently on room and sating 97-98%. Will continue to monitor as needed.     Rc Bhatti, RT

## 2020-07-11 NOTE — PLAN OF CARE
Pt A&Ox4. VSS. Afebrile. RA. Saline locked. Rocephin IV abx. BG monitoring. Lovenox continues. Lasix TID now. Voiding large amounts. Dressings changed to BLE. Boarders of LLE marked.  Left inner ankle had sticky yellow drainage but no increase in redness. Pt up in recliner entire shift. Ambulated to BR with Assist of 1, GB and walker. LS clear diminished. +BS/Flatus. BMx2. Tolerated regular diet. PT decided not to place wraps on LLE but has applied partial wrap/stocking to RLE. Will continue to monitor.

## 2020-07-11 NOTE — PROGRESS NOTES
[facility]  Hospitalist Progress Note  Venkata Mcmahon MD 07/11/2020    Reason for Stay (Diagnosis): sepsis rleated to left LE cellulitis         Assessment and Plan:      Summary of Stay: Da Mustafa is a 59 year old male with prior medical history is significant for severe obesity (body mass index approximately 55) and related complications including glucose intolerance, chronic lower extremity edema, hypertension and possibly sleep apnea currently not treated due to intolerance of his mask who came to attention on 7/6/2020 with malaise and fevers with obvious left lower extremity cellulitis as well as acute kidney injury with creatinine of 3.2 from baseline of 0.9.  He was started on Ancef and clindamycin and has gradually clinically improved though multiple more days are expected here in the hospital.  Creatinine is back to baseline at 0.9 or lower.  Remains admitted for ongoing IV diuresis as well.          Problem List:   1. Left lower extremity cellulitis in the setting of chronic edema.  I suspect the chronic edema is due to venous insufficiency and possibly untreated sleep apnea.  --Continue Ancef and clindamycin  --Diurese as tolerated.  Increasing lasix to 40 mg TID  --Needs long-term lymphedema measures once acute infection is treated.    2. Sepsis.  Dramatically improved.  Hold further IV fluids given lymphedema.    3. Acute kidney injury with baseline creatinine 0.9 with initial creatinine 3.2.  Resolved    4. Hypertension.    5. Severe Obesity.  Following with the bariatric clinic in a diet.  Lost over 40 pounds.    6.  MARLY not on treatment due to intolerance. Trying cpap here.    DVT Prophylaxis: Heparin SQ  Code Status: Full Code  Discharge Dispo: likely return to home, though will need to eval for mobility needs.  Estimated Disch Date / # of Days until Disch: likely 2-3 more days, maybe more.        Interval History (Subjective):      Weight down 1 pound, renal  "function better.   Increasing lasix to 40 mg TID  Erythema improved                    Physical Exam:      Last Vital Signs:  BP (!) 140/63 (BP Location: Right arm)   Pulse 77   Temp 98.5  F (36.9  C) (Temporal)   Resp 16   Ht 1.943 m (6' 4.5\")   Wt (!) 209.4 kg (461 lb 9.6 oz)   SpO2 93%   BMI 55.46 kg/m      I/O last 3 completed shifts:  In: 890 [P.O.:890]  Out: 400 [Urine:400]    Constitutional: Awake, alert, cooperative, no apparent distress   Respiratory: Clear to auscultation bilaterally, no crackles or wheezing   Cardiovascular: Regular rate and rhythm, normal S1 and S2, and no murmur noted   Abdomen: Normal bowel sounds, soft, non-distended, non-tender   Skin: bilat LE edema.  Right anterior shin with a circular lesion of about 3 cm diameter is denuded.     The left leg is hot and inflamed appearing with generalized swelling and areas of slough along with many areas of apparent erythema which now seem to be overall defervesced .  Overall slightly improved again today.   Neuro: Alert and oriented x3.   Extremities:    Other(s):        All other systems: Negative          Medications:      All current medications were reviewed with changes reflected in problem list.         Data:      All new lab and imaging data was reviewed.   Labs/Imaging:  Results for orders placed or performed during the hospital encounter of 07/06/20 (from the past 24 hour(s))   Glucose by meter   Result Value Ref Range    Glucose 88 70 - 99 mg/dL   Glucose by meter   Result Value Ref Range    Glucose 95 70 - 99 mg/dL   Glucose by meter   Result Value Ref Range    Glucose 136 (H) 70 - 99 mg/dL   Glucose by meter   Result Value Ref Range    Glucose 113 (H) 70 - 99 mg/dL   Basic metabolic panel   Result Value Ref Range    Sodium 138 133 - 144 mmol/L    Potassium 4.3 3.4 - 5.3 mmol/L    Chloride 108 94 - 109 mmol/L    Carbon Dioxide 29 20 - 32 mmol/L    Anion Gap 1 (L) 3 - 14 mmol/L    Glucose 104 (H) 70 - 99 mg/dL    Urea Nitrogen " 10 7 - 30 mg/dL    Creatinine 0.72 0.66 - 1.25 mg/dL    GFR Estimate >90 >60 mL/min/[1.73_m2]    GFR Estimate If Black >90 >60 mL/min/[1.73_m2]    Calcium 7.8 (L) 8.5 - 10.1 mg/dL   CBC with platelets differential   Result Value Ref Range    WBC 11.3 (H) 4.0 - 11.0 10e9/L    RBC Count 3.47 (L) 4.4 - 5.9 10e12/L    Hemoglobin 10.4 (L) 13.3 - 17.7 g/dL    Hematocrit 32.6 (L) 40.0 - 53.0 %    MCV 94 78 - 100 fl    MCH 30.0 26.5 - 33.0 pg    MCHC 31.9 31.5 - 36.5 g/dL    RDW 12.9 10.0 - 15.0 %    Platelet Count 289 150 - 450 10e9/L    Diff Method Manual Differential     % Neutrophils 62.0 %    % Lymphocytes 18.0 %    % Monocytes 12.0 %    % Eosinophils 2.0 %    % Basophils 0.0 %    % Metamyelocytes 4.0 %    % Myelocytes 2.0 %    Absolute Neutrophil 7.0 1.6 - 8.3 10e9/L    Absolute Lymphocytes 2.0 0.8 - 5.3 10e9/L    Absolute Monocytes 1.4 (H) 0.0 - 1.3 10e9/L    Absolute Eosinophils 0.2 0.0 - 0.7 10e9/L    Absolute Basophils 0.0 0.0 - 0.2 10e9/L    Absolute Metamyelocytes 0.5 (H) 0 10e9/L    Absolute Myelocytes 0.2 (H) 0 10e9/L    RBC Morphology Consistent with reported results     Platelet Estimate       Automated count confirmed.  Platelet morphology is normal.

## 2020-07-11 NOTE — PLAN OF CARE
PT: Patient seen by physical therapy for evaluation and treatment.  Patient with PMH including severe obesity and related complications including glucose intolerance, chronic lower extremity edema, HTN and possible sleep apnea now admitted for sepsis secondary to left LE cellulitis.  Patient lives with wife; uses a cane for baseline mobility.  Patient reports long history with lymphedema.  Patient has both lymphedema wraps and custom velcro garments (foot and calf).  Patient states that he is unable to don wraps or garments independently; wife assists.  Patient states that he is inconsistent with wearing garments until he notices increased edema and redness at LE.  Patient was wearing right velcro calf garment upon admission.      Discharge Planner PT   Patient plan for discharge: home with resumption of lymphedema wrapping  Current status: PT: Lymphedema evaluation complete and treatment initiated. Patient presents with +3 edema in left foot and calf; left LE with continued redness and wounds.  Patient presents with +2 at right calf/ankle and +1 at right foot.  Discussed wrapping with nursing and patient.  Plan is to hold wrapping of left LE secondary to allow access to wounds and visualization of redness.   Discussed use of patient's right velcro calf garment in conjunction with short stretch bandage at right foot/ankle to address edema in right LE.  This would allow RNs to easily loosen/remove/replace garment that is over patient's anterior calf wound at right LE as needed.  Right LE washed, lotion applied, and quick wrap completed to right foot/ankle/mid calf, with appropriate stretch and spacing to allow gradient compression (medium TG Soft tube bandage layer for skin protection, followed by placement of 8cm short stretch bandage from base of toes to mid calf).  Therapist then donned patient's velcro calf garment from distal calf to proximal calf with overlap of lymph wrap.  If patient does not tolerate wraps  well, please remove wraps but keep LEs elevated. Patient educated on elevation during the day while resting and ambulation for mm pump action.   Barriers to return to prior living situation: none  Recommendations for discharge: Home with new orders for lymphedema OP clinic  Rationale for recommendations: Patient presents with increased bilateral LE edema with significant wounds at left LE.  Patient would benefit from OP lymphedema orders for wrapping until wounds clear, edema decreases and patient is able to utilize bilateral velcro compression garments appropriately.  Will continue to follow during inpatient stay to decrease lymphedema and improve patient's ability to mobilize.       Entered by: Patti Bello 07/11/2020 2:49 PM

## 2020-07-12 ENCOUNTER — APPOINTMENT (OUTPATIENT)
Dept: PHYSICAL THERAPY | Facility: CLINIC | Age: 60
DRG: 872 | End: 2020-07-12
Payer: COMMERCIAL

## 2020-07-12 LAB
ANION GAP SERPL CALCULATED.3IONS-SCNC: 3 MMOL/L (ref 3–14)
BACTERIA SPEC CULT: NO GROWTH
BACTERIA SPEC CULT: NO GROWTH
BUN SERPL-MCNC: 11 MG/DL (ref 7–30)
CALCIUM SERPL-MCNC: 7.9 MG/DL (ref 8.5–10.1)
CHLORIDE SERPL-SCNC: 106 MMOL/L (ref 94–109)
CO2 SERPL-SCNC: 30 MMOL/L (ref 20–32)
CREAT SERPL-MCNC: 0.74 MG/DL (ref 0.66–1.25)
GFR SERPL CREATININE-BSD FRML MDRD: >90 ML/MIN/{1.73_M2}
GLUCOSE BLDC GLUCOMTR-MCNC: 99 MG/DL (ref 70–99)
GLUCOSE SERPL-MCNC: 101 MG/DL (ref 70–99)
PLATELET # BLD AUTO: 286 10E9/L (ref 150–450)
POTASSIUM SERPL-SCNC: 4.3 MMOL/L (ref 3.4–5.3)
SODIUM SERPL-SCNC: 139 MMOL/L (ref 133–144)
SPECIMEN SOURCE: NORMAL
SPECIMEN SOURCE: NORMAL

## 2020-07-12 PROCEDURE — 36415 COLL VENOUS BLD VENIPUNCTURE: CPT | Performed by: INTERNAL MEDICINE

## 2020-07-12 PROCEDURE — 97140 MANUAL THERAPY 1/> REGIONS: CPT | Mod: GP

## 2020-07-12 PROCEDURE — 99233 SBSQ HOSP IP/OBS HIGH 50: CPT | Performed by: INTERNAL MEDICINE

## 2020-07-12 PROCEDURE — 85049 AUTOMATED PLATELET COUNT: CPT | Performed by: INTERNAL MEDICINE

## 2020-07-12 PROCEDURE — 80048 BASIC METABOLIC PNL TOTAL CA: CPT | Performed by: INTERNAL MEDICINE

## 2020-07-12 PROCEDURE — 25000128 H RX IP 250 OP 636: Performed by: INTERNAL MEDICINE

## 2020-07-12 PROCEDURE — 00000146 ZZHCL STATISTIC GLUCOSE BY METER IP

## 2020-07-12 PROCEDURE — 12000000 ZZH R&B MED SURG/OB

## 2020-07-12 RX ADMIN — ENOXAPARIN SODIUM 40 MG: 40 INJECTION SUBCUTANEOUS at 20:48

## 2020-07-12 RX ADMIN — ENOXAPARIN SODIUM 40 MG: 40 INJECTION SUBCUTANEOUS at 10:20

## 2020-07-12 RX ADMIN — MICONAZOLE NITRATE: 20 POWDER TOPICAL at 20:48

## 2020-07-12 RX ADMIN — MICONAZOLE NITRATE: 20 POWDER TOPICAL at 10:21

## 2020-07-12 RX ADMIN — FUROSEMIDE 40 MG: 10 INJECTION, SOLUTION INTRAMUSCULAR; INTRAVENOUS at 13:40

## 2020-07-12 RX ADMIN — SILVER SULFADIAZINE: 10 CREAM TOPICAL at 10:25

## 2020-07-12 RX ADMIN — CEFTRIAXONE 2 G: 2 INJECTION, POWDER, FOR SOLUTION INTRAMUSCULAR; INTRAVENOUS at 16:26

## 2020-07-12 RX ADMIN — FUROSEMIDE 40 MG: 10 INJECTION, SOLUTION INTRAMUSCULAR; INTRAVENOUS at 20:48

## 2020-07-12 RX ADMIN — FUROSEMIDE 40 MG: 10 INJECTION, SOLUTION INTRAMUSCULAR; INTRAVENOUS at 08:46

## 2020-07-12 ASSESSMENT — ACTIVITIES OF DAILY LIVING (ADL)
ADLS_ACUITY_SCORE: 17

## 2020-07-12 ASSESSMENT — MIFFLIN-ST. JEOR: SCORE: 3009.19

## 2020-07-12 NOTE — PLAN OF CARE
Discharge Planner PT   Patient plan for discharge: home with resumption of lymphedema wrapping  Current status: PT: Patient tolerated night with right 8cm short stretch and calf velcro compression garment donned.  Noted significant loosening of calf garment overnight.  Improved right LE edema following compression wrapping (with diuretics).  Patient presenting with +2 right calf and foot, +3 left calf and foot.  Per discussion with Dr. Mcmahon, left LE has demonstrated improvement and can be lymphedema wrapped per patient tolerance.  Plan is to wrap left LE and remove wraps no later than 8 am to allow for MD assessment, wound care, skin cleaning.  RN completed wound care including skin cleaning and appyling lotion at left LE.  Therapist   completed to quick wrap to left foot and calf, with appropriate stretch and spacing to allow for gradient compression (large TG soft tube bandage layer for skin protection, followed by placement of 8/10/10/12 cm short stretch bandages from toes to proximal calf).  Therapist then washed, lotion and completed quick wrap to right foot/ankle/mid calf, with appropriate stretch and spacing to allow gradient compression (medium TG Soft tube bandage layer for skin protection, followed by placement of 10 cm short stretch bandage from base of toes to mid calf).  Therapist then donned patient's velcro calf garment from distal calf to proximal calf with overlap of lymph wrap.  If patient does not tolerate wraps well, please remove wraps but keep LEs elevated. Patient educated on elevation during the day while resting and ambulation for mm pump action.   Barriers to return to prior living situation: none  Recommendations for discharge: Home with new orders for lymphedema OP clinic  Rationale for recommendations: Patient presents with increased bilateral LE edema with significant wounds at left LE.  Patient would benefit from OP lymphedema orders for wrapping until wounds clear, edema  decreases and patient is able to utilize bilateral velcro compression garments appropriately.  Will continue to follow during inpatient stay to decrease lymphedema and improve patient's ability to mobilize.       Entered by: Patti Bello 07/12/2020 2:19 PM       Addendum:  approx 20 min after donning left LE wrap, patient reported increased pain at left foot (specifically pressure at dorsum of foot near toes).  Request therapist re-wrap left LE.  Therapist removed wrappings, discussed wrapping of bilateral LEs.  Patient requested to place velcro garment on left LE instead of right LE.  Therapist completed quick wrap to left foot/ankle/mid calf with 8cm wrap, then donned velcro compression wrap from distal to proximal calf.  Therapist then completed quick wrap at right LE with 8/10/10/12 cm wrap.  RN updated.

## 2020-07-12 NOTE — PROGRESS NOTES
[facility]  Hospitalist Progress Note  Venkata Mcmahon MD 07/12/2020    Reason for Stay (Diagnosis): sepsis rleated to left LE cellulitis         Assessment and Plan:      Summary of Stay: Da Mustafa is a 59 year old male with prior medical history is significant for severe obesity (body mass index approximately 55) and related complications including glucose intolerance, chronic lower extremity edema, hypertension and possibly sleep apnea currently not treated due to intolerance of his mask who came to attention on 7/6/2020 with malaise and fevers with obvious left lower extremity cellulitis as well as acute kidney injury with creatinine of 3.2 from baseline of 0.9.  He was started on Ancef and clindamycin and has gradually clinically improved though multiple more days are expected here in the hospital.  Transitioned to sole ceftriaxone as per ID.  Creatinine is back to baseline at 0.9 or lower.  Remains admitted for ongoing IV diuresis as well.          Problem List:   1. Left lower extremity cellulitis in the setting of chronic edema.  I suspect the chronic edema is due to venous insufficiency and possibly untreated sleep apnea.  --Continue Ancef and clindamycin  --Diurese as tolerated.  Increased lasix to 40 mg TID through today  --Needs long-term lymphedema measures once acute infection is treated.    2. Sepsis.  Dramatically improved.  Hold further IV fluids given lymphedema.    3. Acute kidney injury with baseline creatinine 0.9 with initial creatinine 3.2.  Resolved    4. Hypertension.    5. Severe Obesity.  Following with the bariatric clinic in a diet.  Lost over 40 pounds.    6.  MARLY not on treatment due to intolerance. Trying cpap here.    DVT Prophylaxis: Heparin SQ  Code Status: Full Code  Discharge Dispo: likely return to home, though will need to eval for mobility needs.  Estimated Disch Date / # of Days until Disch: likely 1-2 more days mainly for diuresis, maybe  "more.        Interval History (Subjective):      Weight down 5 pounds, renal function stable.  Left leg still tight, swollen but a bit better.  continuing lasix to 40 mg TID  Erythema improved  2L fluid restriction                    Physical Exam:      Last Vital Signs:  /59 (BP Location: Right arm)   Pulse 77   Temp 98.7  F (37.1  C) (Oral)   Resp 16   Ht 1.943 m (6' 4.5\")   Wt (!) 207.5 kg (457 lb 6.4 oz)   SpO2 93%   BMI 54.95 kg/m      I/O last 3 completed shifts:  In: 1500 [P.O.:1500]  Out: 6910 [Urine:6910]    Constitutional: Awake, alert, cooperative, no apparent distress   Respiratory: Clear to auscultation bilaterally, no crackles or wheezing   Cardiovascular: Regular rate and rhythm, normal S1 and S2, and no murmur noted   Abdomen: Normal bowel sounds, soft, non-distended, non-tender   Skin: bilat LE edema.  Right anterior shin with a circular lesion of about 3 cm diameter is denuded.     The left leg is hot and inflamed appearing with generalized swelling and areas of slough along with many areas of apparent erythema which now seem to be overall defervesced .  Overall slightly improved again today.   Neuro: Alert and oriented x3.   Extremities:    Other(s):        All other systems: Negative          Medications:      All current medications were reviewed with changes reflected in problem list.         Data:      All new lab and imaging data was reviewed.   Labs/Imaging:  Results for orders placed or performed during the hospital encounter of 07/06/20 (from the past 24 hour(s))   Glucose by meter   Result Value Ref Range    Glucose 96 70 - 99 mg/dL   Glucose by meter   Result Value Ref Range    Glucose 87 70 - 99 mg/dL   Glucose by meter   Result Value Ref Range    Glucose 90 70 - 99 mg/dL   Platelet count   Result Value Ref Range    Platelet Count 286 150 - 450 10e9/L   Basic metabolic panel   Result Value Ref Range    Sodium 139 133 - 144 mmol/L    Potassium 4.3 3.4 - 5.3 mmol/L    " Chloride 106 94 - 109 mmol/L    Carbon Dioxide 30 20 - 32 mmol/L    Anion Gap 3 3 - 14 mmol/L    Glucose 101 (H) 70 - 99 mg/dL    Urea Nitrogen 11 7 - 30 mg/dL    Creatinine 0.74 0.66 - 1.25 mg/dL    GFR Estimate >90 >60 mL/min/[1.73_m2]    GFR Estimate If Black >90 >60 mL/min/[1.73_m2]    Calcium 7.9 (L) 8.5 - 10.1 mg/dL   Glucose by meter   Result Value Ref Range    Glucose 99 70 - 99 mg/dL

## 2020-07-12 NOTE — PLAN OF CARE
Pt receiving IV lasix TID, voiding adequate amounts. Pt ambulating to the bathroom with A1, walker GB. BG 87 and 90 during shift no coverage. Redness on left leg receeding from rob, denies pain. Pt still refusing to sleep on the bed or use the c-pap. Pt educated about the risks and the benefits of using the APM. Will keep monitoring.

## 2020-07-12 NOTE — PLAN OF CARE
A/O. Dressings changed to BLE per order. Lymphedema wraps applied to BLE per therapy.  Denies pain. Swelling continues to BLE. Continues on IV Lasix for diuresis as well as IV ATB.  Voiding in large amounts. Up with Ax1 et walker to bathroom.  Started on 2000ml fluid restriction.  Possible discharge tomorrow or Tuesday.  Will continue to monitor.

## 2020-07-12 NOTE — PLAN OF CARE
Orientation: Alert and oriented x4  VSS. 96% on RA. afebrile.    LS: diminished throughout. Pt declining CPAP overnight as does not tolerate it well  GI: Passing gas. no BM. Denies N/V.   : Adequate urine output.   Skin: wounds to lower extremities. Lymph wrap to right leg.   Activity: Ax1 with walker. Up in chair throughout shift. Legs elevated. Pt slept comfortably throughout shift.   Pain: 2/10 left foot pain. Pt declining PRN interventions offered.   Plan: Continue with current cares.

## 2020-07-12 NOTE — PROGRESS NOTES
[facility]  Infectious Disease Progress Note          Assessment and Plan:   IMPRESSION:   1.  A 59-year-old male with acute sepsis, severe left leg cellulitis with underlying lymphedema, very likely strep, but multiple organisms possible in this setting.   2.  Severe venous stasis and lymphedema including recurrent bilateral leg wounds, major blistering areas currently probably related to streptococcal cellulitis.   3.  Recurrent cellulitis in the past, although no prior hospitalizations, manageable as an outpatient prior to this.   4.  Morbid obesity.   5.  Acute renal failure related to sepsis.   6.  COVID-19 rule out, but given this alternative diagnosis despite some suggestive symptoms, unlikely COVID-19.      RECOMMENDATIONS:   1.  Discontinued COVID isolation.   2.  Continue antibiotics, likely strep,  to ceftriaxone atleast thru tomorrow dose  3.  Neg blood cultures.  Significant rigors and sepsis, so quite possible bacteremic at least transiently.   4.  Leg elevation as much as possible. Could do wraps also if he can tolerate  5.  Wound care as needed. Wd cx with 5 orgs doubt this is sig relative to leg cellulitis, both strep and staph aureus in cxs    Ceftriaxone while here, then maybe augmentin for 10 days and home to include continued leg elevation, still impressive edema and skin changes        Interval History:   no new complaints and doing well; no cp, sob, n/v/d, or abd pain. Pain and leg better T down BC neg leg wd cx staph 2 GNR, strep etc WBc 11 K              Medications:       cefTRIAXone  2 g Intravenous Q24H     enoxaparin ANTICOAGULANT  40 mg Subcutaneous Q12H     furosemide  40 mg Intravenous TID     miconazole   Topical BID     silver sulfADIAZINE   Topical Daily     sodium chloride (PF)  3 mL Intracatheter Q8H                  Physical Exam:   Blood pressure 122/53, pulse 77, temperature 98.7  F (37.1  C), temperature source Oral, resp. rate 18, height 1.943 m (6'  "4.5\"), weight (!) 207.5 kg (457 lb 6.4 oz), SpO2 95 %.  Wt Readings from Last 2 Encounters:   07/12/20 (!) 207.5 kg (457 lb 6.4 oz)   03/04/20 (!) 194.9 kg (429 lb 9.6 oz)     Vital Signs with Ranges  Temp:  [97.4  F (36.3  C)-98.8  F (37.1  C)] 98.7  F (37.1  C)  Heart Rate:  [75-94] 94  Resp:  [16-18] 18  BP: (122-142)/(53-60) 122/53  SpO2:  [93 %-97 %] 95 %    Constitutional: Awake, alert, cooperative, no apparent distress looks well   Lungs: Clear to auscultation bilaterally, no crackles or wheezing   Cardiovascular: Regular rate and rhythm, normal S1 and S2, and no murmur noted   Abdomen: Normal bowel sounds, soft, non-distended, non-tender   Skin: No rashes, no cyanosis, major edema L leg a bit better further   Other:           Data:   All microbiology laboratory data reviewed.  Recent Labs   Lab Test 07/12/20  0633 07/11/20  0648 07/10/20  0706 07/09/20  0710   WBC  --  11.3* 13.3* 13.2*   HGB  --  10.4* 10.5* 10.3*   HCT  --  32.6* 32.8* 32.8*   MCV  --  94 94 94    289 306 294     Recent Labs   Lab Test 07/12/20  0633 07/11/20  0648 07/10/20  0706   CR 0.74 0.72 0.84     No lab results found.  Recent Labs   Lab Test 07/07/20  0930 07/06/20  1434 07/06/20  1340 07/03/18  0945   CULT Light growth  Acinetobacter baumannii complex  *  Moderate growth  Staphylococcus aureus  *  Light growth  Beta hemolytic Streptococcus group G  *  Light growth  Strain 2  Acinetobacter baumannii complex  *  Light growth  Stenotrophomonas maltophilia  *  Heavy growth  Corynebacterium striatum  Identification obtained by MALDI-TOF mass spectrometry research use only database. Test   characteristics determined and verified by the Infect[facility] Diagnostic Laboratory   ([facility]) [place], [place].  Susceptibility testing not routinely done  *  Light growth  Staphylococcus aureus  Susceptibility testing done on previous specimen  *  Light growth  Normal skin alfonso   No growth No growth Heavy growth  Escherichia coli  * "  Heavy growth  Acinetobacter baumannii complex  *  Heavy growth  Staphylococcus aureus  *  Light growth  Beta hemolytic Streptococcus group G  *  Heavy growth  Corynebacterium striatum  Identification obtained by MALDI-TOF mass spectrometry research use only database. Test   characteristics determined and verified by the Infect[facility] Diseases Diagnostic Laboratory   ([facility]) [place], [place].  Susceptibility testing not routinely done  *  Heavy growth  Enterococcus faecalis  *

## 2020-07-13 ENCOUNTER — APPOINTMENT (OUTPATIENT)
Dept: PHYSICAL THERAPY | Facility: CLINIC | Age: 60
DRG: 872 | End: 2020-07-13
Payer: COMMERCIAL

## 2020-07-13 LAB
ANION GAP SERPL CALCULATED.3IONS-SCNC: 3 MMOL/L (ref 3–14)
BUN SERPL-MCNC: 14 MG/DL (ref 7–30)
CALCIUM SERPL-MCNC: 8 MG/DL (ref 8.5–10.1)
CHLORIDE SERPL-SCNC: 103 MMOL/L (ref 94–109)
CO2 SERPL-SCNC: 31 MMOL/L (ref 20–32)
CREAT SERPL-MCNC: 0.82 MG/DL (ref 0.66–1.25)
GFR SERPL CREATININE-BSD FRML MDRD: >90 ML/MIN/{1.73_M2}
GLUCOSE SERPL-MCNC: 106 MG/DL (ref 70–99)
MAGNESIUM SERPL-MCNC: 1.9 MG/DL (ref 1.6–2.3)
POTASSIUM SERPL-SCNC: 3.9 MMOL/L (ref 3.4–5.3)
SODIUM SERPL-SCNC: 137 MMOL/L (ref 133–144)

## 2020-07-13 PROCEDURE — 83735 ASSAY OF MAGNESIUM: CPT | Performed by: INTERNAL MEDICINE

## 2020-07-13 PROCEDURE — 36415 COLL VENOUS BLD VENIPUNCTURE: CPT | Performed by: INTERNAL MEDICINE

## 2020-07-13 PROCEDURE — 25000128 H RX IP 250 OP 636: Performed by: INTERNAL MEDICINE

## 2020-07-13 PROCEDURE — 80048 BASIC METABOLIC PNL TOTAL CA: CPT | Performed by: INTERNAL MEDICINE

## 2020-07-13 PROCEDURE — G0463 HOSPITAL OUTPT CLINIC VISIT: HCPCS

## 2020-07-13 PROCEDURE — 97140 MANUAL THERAPY 1/> REGIONS: CPT | Mod: GP | Performed by: PHYSICAL THERAPIST

## 2020-07-13 PROCEDURE — 12000000 ZZH R&B MED SURG/OB

## 2020-07-13 PROCEDURE — 99233 SBSQ HOSP IP/OBS HIGH 50: CPT | Performed by: INTERNAL MEDICINE

## 2020-07-13 RX ORDER — POTASSIUM CHLORIDE 7.45 MG/ML
10 INJECTION INTRAVENOUS
Status: DISCONTINUED | OUTPATIENT
Start: 2020-07-13 | End: 2020-07-15 | Stop reason: HOSPADM

## 2020-07-13 RX ORDER — MAGNESIUM SULFATE HEPTAHYDRATE 40 MG/ML
4 INJECTION, SOLUTION INTRAVENOUS EVERY 4 HOURS PRN
Status: DISCONTINUED | OUTPATIENT
Start: 2020-07-13 | End: 2020-07-15 | Stop reason: HOSPADM

## 2020-07-13 RX ORDER — POTASSIUM CL/LIDO/0.9 % NACL 10MEQ/0.1L
10 INTRAVENOUS SOLUTION, PIGGYBACK (ML) INTRAVENOUS
Status: DISCONTINUED | OUTPATIENT
Start: 2020-07-13 | End: 2020-07-15 | Stop reason: HOSPADM

## 2020-07-13 RX ORDER — FUROSEMIDE 10 MG/ML
40 INJECTION INTRAMUSCULAR; INTRAVENOUS ONCE
Status: COMPLETED | OUTPATIENT
Start: 2020-07-13 | End: 2020-07-13

## 2020-07-13 RX ORDER — POTASSIUM CHLORIDE 1500 MG/1
20-40 TABLET, EXTENDED RELEASE ORAL
Status: DISCONTINUED | OUTPATIENT
Start: 2020-07-13 | End: 2020-07-15 | Stop reason: HOSPADM

## 2020-07-13 RX ORDER — POTASSIUM CHLORIDE 1.5 G/1.58G
20-40 POWDER, FOR SOLUTION ORAL
Status: DISCONTINUED | OUTPATIENT
Start: 2020-07-13 | End: 2020-07-15 | Stop reason: HOSPADM

## 2020-07-13 RX ORDER — FUROSEMIDE 10 MG/ML
40 INJECTION INTRAMUSCULAR; INTRAVENOUS
Status: COMPLETED | OUTPATIENT
Start: 2020-07-13 | End: 2020-07-14

## 2020-07-13 RX ORDER — MAGNESIUM SULFATE HEPTAHYDRATE 40 MG/ML
2 INJECTION, SOLUTION INTRAVENOUS DAILY PRN
Status: DISCONTINUED | OUTPATIENT
Start: 2020-07-13 | End: 2020-07-15 | Stop reason: HOSPADM

## 2020-07-13 RX ORDER — POTASSIUM CHLORIDE 29.8 MG/ML
20 INJECTION INTRAVENOUS
Status: DISCONTINUED | OUTPATIENT
Start: 2020-07-13 | End: 2020-07-15 | Stop reason: HOSPADM

## 2020-07-13 RX ADMIN — MICONAZOLE NITRATE: 20 POWDER TOPICAL at 08:06

## 2020-07-13 RX ADMIN — CEFTRIAXONE 2 G: 2 INJECTION, POWDER, FOR SOLUTION INTRAMUSCULAR; INTRAVENOUS at 16:27

## 2020-07-13 RX ADMIN — FUROSEMIDE 40 MG: 10 INJECTION, SOLUTION INTRAMUSCULAR; INTRAVENOUS at 08:05

## 2020-07-13 RX ADMIN — SILVER SULFADIAZINE: 10 CREAM TOPICAL at 10:59

## 2020-07-13 RX ADMIN — ENOXAPARIN SODIUM 40 MG: 40 INJECTION SUBCUTANEOUS at 10:46

## 2020-07-13 RX ADMIN — MICONAZOLE NITRATE: 20 POWDER TOPICAL at 20:35

## 2020-07-13 RX ADMIN — FUROSEMIDE 40 MG: 10 INJECTION, SOLUTION INTRAMUSCULAR; INTRAVENOUS at 16:27

## 2020-07-13 RX ADMIN — ENOXAPARIN SODIUM 40 MG: 40 INJECTION SUBCUTANEOUS at 20:35

## 2020-07-13 ASSESSMENT — ACTIVITIES OF DAILY LIVING (ADL)
ADLS_ACUITY_SCORE: 17
ADLS_ACUITY_SCORE: 16

## 2020-07-13 ASSESSMENT — MIFFLIN-ST. JEOR: SCORE: 2971.54

## 2020-07-13 NOTE — PROGRESS NOTES
[facility]  Infectious Disease Progress Note          Assessment and Plan:   IMPRESSION:   1.  A 59-year-old male with acute sepsis, severe left leg cellulitis with underlying lymphedema, very likely strep, but multiple organisms possible in this setting.   2.  Severe venous stasis and lymphedema including recurrent bilateral leg wounds, major blistering areas currently probably related to streptococcal cellulitis.   3.  Recurrent cellulitis in the past, although no prior hospitalizations, manageable as an outpatient prior to this.   4.  Morbid obesity.   5.  Acute renal failure related to sepsis.   6.  COVID-19 rule out, but given this alternative diagnosis despite some suggestive symptoms, unlikely COVID-19.      RECOMMENDATIONS:      1  Continue antibiotics, likely strep,  to ceftriaxone while here  3.  Neg blood cultures.  Significant rigors and sepsis, so quite possible bacteremic at least transiently.   4.  Leg elevation as much as possible. Could do wraps also if he can tolerate  5.  Wound care as needed. Wd cx with 5 orgs doubt this is sig relative to leg cellulitis, both strep and staph aureus in cxs  , acinetobacter conceivable GNR pathogen for this is pansens  Ceftriaxone while here, then  augmentin for 10 days and home to include continued leg elevation, still impressive edema and skin changes        Interval History:   no new complaints and doing well; no cp, sob, n/v/d, or abd pain. Pain and leg better T down BC neg leg wd cx staph 2 GNR, strep etc WBc 11 K              Medications:       cefTRIAXone  2 g Intravenous Q24H     enoxaparin ANTICOAGULANT  40 mg Subcutaneous Q12H     furosemide  40 mg Intravenous BID     miconazole   Topical BID     silver sulfADIAZINE   Topical Daily     sodium chloride (PF)  3 mL Intracatheter Q8H                  Physical Exam:   Blood pressure 125/57, pulse 77, temperature 99.9  F (37.7  C), temperature source Temporal, resp. rate 18, height  "1.943 m (6' 4.5\"), weight (!) 203.7 kg (449 lb 1.6 oz), SpO2 94 %.  Wt Readings from Last 2 Encounters:   07/13/20 (!) 203.7 kg (449 lb 1.6 oz)   03/04/20 (!) 194.9 kg (429 lb 9.6 oz)     Vital Signs with Ranges  Temp:  [98.5  F (36.9  C)-99.9  F (37.7  C)] 99.9  F (37.7  C)  Heart Rate:  [70-85] 80  Resp:  [16-18] 18  BP: (125-134)/(53-57) 125/57  SpO2:  [93 %-95 %] 94 %    Constitutional: Awake, alert, cooperative, no apparent distress looks well   Lungs: Clear to auscultation bilaterally, no crackles or wheezing   Cardiovascular: Regular rate and rhythm, normal S1 and S2, and no murmur noted   Abdomen: Normal bowel sounds, soft, non-distended, non-tender   Skin: No rashes, no cyanosis, major edema L leg a bit better further   Other:           Data:   All microbiology laboratory data reviewed.  Recent Labs   Lab Test 07/12/20  0633 07/11/20  0648 07/10/20  0706 07/09/20  0710   WBC  --  11.3* 13.3* 13.2*   HGB  --  10.4* 10.5* 10.3*   HCT  --  32.6* 32.8* 32.8*   MCV  --  94 94 94    289 306 294     Recent Labs   Lab Test 07/13/20  0634 07/12/20  0633 07/11/20  0648   CR 0.82 0.74 0.72     No lab results found.  Recent Labs   Lab Test 07/07/20  0930 07/06/20  1434 07/06/20  1340 07/03/18  0945   CULT Light growth  Acinetobacter baumannii complex  *  Moderate growth  Staphylococcus aureus  *  Light growth  Beta hemolytic Streptococcus group G  *  Light growth  Strain 2  Acinetobacter baumannii complex  *  Light growth  Stenotrophomonas maltophilia  *  Heavy growth  Corynebacterium striatum  Identification obtained by MALDI-TOF mass spectrometry research use only database. Test   characteristics determined and verified by the Infectious Diseases Diagnostic Laboratory   ([facility]) [place], [place].  Susceptibility testing not routinely done  *  Light growth  Staphylococcus aureus  Susceptibility testing done on previous specimen  *  Light growth  Normal skin alfonso   No growth No growth Heavy " growth  Escherichia coli  *  Heavy growth  Acinetobacter baumannii complex  *  Heavy growth  Staphylococcus aureus  *  Light growth  Beta hemolytic Streptococcus group G  *  Heavy growth  Corynebacterium striatum  Identification obtained by MALDI-TOF mass spectrometry research use only database. Test   characteristics determined and verified by the Infectious Diseases Diagnostic Laboratory   ([facility]) [place], [place].  Susceptibility testing not routinely done  *  Heavy growth  Enterococcus faecalis  *

## 2020-07-13 NOTE — PLAN OF CARE
Orientation: Alert and oriented x4  VSS. 94% on RA. Afebrile.    LS: diminished throughout  GI: Passing gas. no BM. Denies N/V.   : Adequate urine output.   Skin: bruises. Cellulitis to left leg. UTV. Lymph wraps on bilateral LE.   Activity: SBA with walker. Up to bathroom overnight. Pt slept comfortably throughout shift.   Pain: 0/10. Denies pain throughout shift. No PRN interventions overnight  Plan: Continue with current cares.

## 2020-07-13 NOTE — PROGRESS NOTES
[facility]  Hospitalist Progress Note  Venkata Mcmahon MD 07/13/2020    Reason for Stay (Diagnosis): sepsis rleated to left LE cellulitis         Assessment and Plan:      Summary of Stay: Da Mustafa is a 59 year old male with prior medical history is significant for severe obesity (body mass index approximately 55) and related complications including glucose intolerance, chronic lower extremity edema, hypertension and possibly sleep apnea currently not treated due to intolerance of his mask who came to attention on 7/6/2020 with malaise and fevers with obvious left lower extremity cellulitis as well as acute kidney injury with creatinine of 3.2 from baseline of 0.9.  He was started on Ancef and clindamycin and has gradually clinically improved though multiple more days are expected here in the hospital.  Transitioned to sole ceftriaxone as per ID.  Creatinine is back to baseline at 0.9 or lower.      Remains admitted for ongoing IV diuresis.  Infection is improving and I think he could be changed to oral Augmentin upon discharge.    Giving IV Lasix 40 mg twice daily on 7/13 which is a slight dose reduction from 3 times daily the past couple of days, rechecking kidney function tomorrow.  Weight is down about 12 pounds in the past 3 days and has had improvement in swelling in his legs.      Problem List:   1. Left lower extremity cellulitis in the setting of chronic edema.  I suspect the chronic edema is due to venous insufficiency and possibly untreated sleep apnea.  --Remains on IV ceftriaxone.  Anticipate changing to oral Augmentin for 1 week upon discharge.  --Diurese as tolerated.    --Needs long-term lymphedema measures once acute infection is treated.  They are following here.  --Wound RN consult: has a lot of areas of dry and cracked skin likely related to severe chronic edema.  Some of these are opening and bleeding.    2. Sepsis.  Dramatically improved.     3. Acute kidney  "injury with baseline creatinine 0.9 with initial creatinine 3.2.  Resolved    4. Hypertension.    5. Morbid obesity.  Following with the bariatric clinic in [place].    6.  MARLY not on treatment due to intolerance. Trying cpap here.    DVT Prophylaxis: Heparin SQ  Code Status: Full Code  Discharge Dispo: likely return to home, though will need to eval for mobility needs.  Estimated Disch Date / # of Days until Disch: likely 1-2 more days mainly for diuresis, maybe more.        Interval History (Subjective):      Weight down 5 pounds, renal function stable overall.  Left foot and leg tightness slightly better, not some wrinkles in the skin.  continuing lasix to 40 mg twice daily  Erythema improved  Following 2L fluid restriction  His wife was updated at the bedside.                  Physical Exam:      Last Vital Signs:  /57 (BP Location: Right arm)   Pulse 77   Temp 99.9  F (37.7  C) (Temporal)   Resp 18   Ht 1.943 m (6' 4.5\")   Wt (!) 203.7 kg (449 lb 1.6 oz)   SpO2 94%   BMI 53.95 kg/m      I/O last 3 completed shifts:  In: 1080 [P.O.:1080]  Out: 5300 [Urine:5300]    Constitutional: Awake, alert, cooperative, no apparent distress   Respiratory: Clear to auscultation bilaterally, no crackles or wheezing   Cardiovascular: Regular rate and rhythm, normal S1 and S2, and no murmur noted   Abdomen: Normal bowel sounds, soft, non-distended, non-tender   Skin: bilat LE edema, much worse on the left.  Right anterior shin with a circular lesion of about 3 cm diameter is denuded with some serous drainage.    The left leg has generalized swelling and areas of slough along with many areas of apparent erythema which now seem to be overall defervesced and becoming much more pale and less warm to the touch.  Overall slightly improved again today.  Has a number of areas of dry cracked skin as well.   Neuro: Alert and oriented x3.   Extremities:    Other(s):        All other systems: Negative          Medications:    "   All current medications were reviewed with changes reflected in problem list.         Data:      All new lab and imaging data was reviewed.   Labs/Imaging:  Results for orders placed or performed during the hospital encounter of 07/06/20 (from the past 24 hour(s))   Basic metabolic panel   Result Value Ref Range    Sodium 137 133 - 144 mmol/L    Potassium 3.9 3.4 - 5.3 mmol/L    Chloride 103 94 - 109 mmol/L    Carbon Dioxide 31 20 - 32 mmol/L    Anion Gap 3 3 - 14 mmol/L    Glucose 106 (H) 70 - 99 mg/dL    Urea Nitrogen 14 7 - 30 mg/dL    Creatinine 0.82 0.66 - 1.25 mg/dL    GFR Estimate >90 >60 mL/min/[1.73_m2]    GFR Estimate If Black >90 >60 mL/min/[1.73_m2]    Calcium 8.0 (L) 8.5 - 10.1 mg/dL

## 2020-07-13 NOTE — PLAN OF CARE
A/O.  Dressings changed to BLE and bilat lymphedema wraps applied per PT.  Continues on IV Lasix BID today as well as IV Rocephin.  Max temp 99.9.  Redness within marked parameter to LLE.  Up with SBA et walker in room.  Voiding.  Denies pain.  Hopeful to discharge in the next couple days. Will continue to monitor.

## 2020-07-13 NOTE — PLAN OF CARE
Evening RN  Pt A/O x4.  VSS and afebrile.  Pt denies pain.  CMS intact - edema in BLE's.  Lymphedema wraps in place and pt currently feels comfortable with them on.  Skin has continued redness that remains within drawn boundaries and per pt seems to be mildly improved.  Up A1 with walker and gait belt.  Voiding adequately - fairly frequent large amounts.  Tolerating regular diet well - very compliant with fluid restriction and well within boundaries for the day. Prefers chair over bed and is independently positioning himself very well.  IV rocephin as antibiotic.  Last dose of IV lasix given tonight.  Will continue to monitor.

## 2020-07-13 NOTE — CONSULTS
[facility] Nurse Inpatient Wound Assessment   Reason for consultation: BLE wound     Assessment  BLE wound due to mixed etiology: Lymphedema, suspect  PVD  Status: follow up of  BLE wounds,  Chronic wounds; used Silvadene to debride wound base due to suspected infection and is cleaning up wounds appropriately.    Pt reports  NOT sleeping in  Bed for over 2 years. He sleeps in a reclining chair with feet down.     Treatment Plan  BLE wound: Daily    1.wash BLE with baby shampoo and warm water: suds up the wash cloth  2. Rinse with wound  Spray  3.  Sween to intact skin and feet  4. Smear Silvadene on  ABD; apply to RLE shin wound and Left inner ankle LLE- Lateral  5. Secure with 1/2 roll of Kerlix/ tape; Lymphedema wraps  6. Keep BLE on blue wedge pillow and Blue chux  Open to air  7. Encourage  Pt to flex and extend feet for 5-10 every hour while awake     Orders Written- updated  Recommended provider order: Silvadene OK'd by Dr Ashraf  [facility] Nurse follow-up plan:weekly  Nursing to notify the Provider(s) and re-consult the [facility] Nurse if wound(s) deteriorates or new skin concern.    Patient History  According to provider note(s):  59 year old male with a history of lymphedema, hypertension and morbid obesity who is admitted to the hospitalist service for severe sepsis secondary to left lower extremity cellulitis in the setting of chronic lymphedema.    Objective Data  Containment of urine/stool: Continent of bladder and Continent of bowel    Active Diet Order  Orders Placed This Encounter      Combination Diet Regular Diet Adult      Output:   I/O last 3 completed shifts:  In: 1080 [P.O.:1080]  Out: 5300 [Urine:5300]                            Labs:   Recent Labs   Lab 07/11/20  0648  07/07/20  0808 07/07/20  0041 07/06/20  1639 07/06/20  1341   ALBUMIN  --   --   --   --  2.1*  --    HGB 10.4*   < > 11.3* 11.1*  --  13.9   INR  --   --   --   --   --  1.10   WBC 11.3*   < > 15.5* 15.2*  --  21.9*   A1C  --   --  5.5  --   --    --    CRP  --   --   --  181.0*  --   --     < > = values in this interval not displayed.       Physical Exam  Skin inspection: BLE    Wound Location:  Right  And Left  Leg  Date of last photo 7/7/20              Right anterior LE                                                        Left medial malleolus and  LLE    Wound History: see above; pt is poor historian and makes many excuses for not taking proper care of himself  Measurements (length x width x depth, in cm)   LLE: entire gator area anterior and posterior dry flaking red scabs and epidermis  LLE medial malleolus: 7 x 10cm dry red/ yellow scab and peeling flaky scabs and dry skin   RLE anterior: 2.5 x 1.8cm moist pink wound within 8x8cm red denuded skin and peeling skin  Right Great toe Medial Callous with dark red spot deep within callous- unchanged  Right second toe plantar tip: 1x1cm dry red scab and callous from hammer toes    Wound Base: see above   Palpation of the wound bed: normal, dry   Periwound skin: edematous, hemosiderin staining, lipodermatosclerosis, macerated and superficial erosion, dry flaky skin and scabs  Periwound Temperature: normal   Drainage:, small  Description of drainage: yellow / serosang  Odor: mild  Pain: denies ,     Interventions  Current support surface: Standard  Atmos Air mattress  Current off-loading measures: Pillows under calves and Pillows  Visual inspection and assessment completed   Wound Care: completed by RN  Supplies: ordered: per POC and discussed with RN  Education provided: plan of care, wound progress, Infection prevention  and Off-loading pressure  Discussed plan of care with pt and RN     Michelle Young RN, CWON

## 2020-07-13 NOTE — PLAN OF CARE
Discharge Planner PT   Patient plan for discharge: home with resumption of lymphedema wrapping  Current status: PT: Patient tolerated night with right 8cm short stretch and calf velcro compression garment donned. Improved right LE edema following compression wrapping (with diuretics).  Patient presenting with +2 right calf and foot, +3 left calf and foot.  Continue to allow for MD visualization tomorrow with doffing at 8 am to allow for MD assessment, wound care, skin cleaning.  RN completed wound care including skin cleaning and appyling lotion at left LE.  Used large drainage packs for skin padding and decreased shearing forces over B LEs joseph with wounds invovled, used kerlix to adhere small ab pads to feet. Then therapist completed to quick wrap to left foot and calf, with appropriate stretch and spacing to allow for gradient compression.   If patient does not tolerate wraps well, please remove wraps but keep LEs elevated. Patient educated on elevation during the day while resting and ambulation for mm pump action.   Barriers to return to prior living situation: none  Recommendations for discharge: Home with new orders for lymphedema OP clinic  Rationale for recommendations: Patient presents with increased bilateral LE edema with significant wounds at left LE.  Patient would benefit from OP lymphedema orders for wrapping until wounds clear, edema decreases and patient is able to utilize bilateral velcro compression garments appropriately.  Will continue to follow during inpatient stay to decrease lymphedema and improve patient's ability to mobilize.       Entered by: Michelle Hwang 07/13/2020 4:23 PM

## 2020-07-14 ENCOUNTER — APPOINTMENT (OUTPATIENT)
Dept: PHYSICAL THERAPY | Facility: CLINIC | Age: 60
DRG: 872 | End: 2020-07-14
Payer: COMMERCIAL

## 2020-07-14 LAB
ANION GAP SERPL CALCULATED.3IONS-SCNC: 3 MMOL/L (ref 3–14)
BASOPHILS # BLD AUTO: 0 10E9/L (ref 0–0.2)
BASOPHILS NFR BLD AUTO: 0.5 %
BUN SERPL-MCNC: 18 MG/DL (ref 7–30)
CALCIUM SERPL-MCNC: 7.9 MG/DL (ref 8.5–10.1)
CHLORIDE SERPL-SCNC: 105 MMOL/L (ref 94–109)
CO2 SERPL-SCNC: 30 MMOL/L (ref 20–32)
CREAT SERPL-MCNC: 0.79 MG/DL (ref 0.66–1.25)
DIFFERENTIAL METHOD BLD: ABNORMAL
EOSINOPHIL # BLD AUTO: 0.2 10E9/L (ref 0–0.7)
EOSINOPHIL NFR BLD AUTO: 2.1 %
ERYTHROCYTE [DISTWIDTH] IN BLOOD BY AUTOMATED COUNT: 12.6 % (ref 10–15)
GFR SERPL CREATININE-BSD FRML MDRD: >90 ML/MIN/{1.73_M2}
GLUCOSE SERPL-MCNC: 102 MG/DL (ref 70–99)
HCT VFR BLD AUTO: 33.6 % (ref 40–53)
HGB BLD-MCNC: 10.6 G/DL (ref 13.3–17.7)
IMM GRANULOCYTES # BLD: 0.2 10E9/L (ref 0–0.4)
IMM GRANULOCYTES NFR BLD: 2.5 %
LYMPHOCYTES # BLD AUTO: 1.5 10E9/L (ref 0.8–5.3)
LYMPHOCYTES NFR BLD AUTO: 19.1 %
MCH RBC QN AUTO: 30.1 PG (ref 26.5–33)
MCHC RBC AUTO-ENTMCNC: 31.5 G/DL (ref 31.5–36.5)
MCV RBC AUTO: 96 FL (ref 78–100)
MONOCYTES # BLD AUTO: 0.5 10E9/L (ref 0–1.3)
MONOCYTES NFR BLD AUTO: 7 %
NEUTROPHILS # BLD AUTO: 5.3 10E9/L (ref 1.6–8.3)
NEUTROPHILS NFR BLD AUTO: 68.8 %
NRBC # BLD AUTO: 0 10*3/UL
NRBC BLD AUTO-RTO: 0 /100
PLATELET # BLD AUTO: 271 10E9/L (ref 150–450)
POTASSIUM SERPL-SCNC: 4.1 MMOL/L (ref 3.4–5.3)
RBC # BLD AUTO: 3.52 10E12/L (ref 4.4–5.9)
SODIUM SERPL-SCNC: 138 MMOL/L (ref 133–144)
WBC # BLD AUTO: 7.7 10E9/L (ref 4–11)

## 2020-07-14 PROCEDURE — 12000000 ZZH R&B MED SURG/OB

## 2020-07-14 PROCEDURE — 25000128 H RX IP 250 OP 636: Performed by: INTERNAL MEDICINE

## 2020-07-14 PROCEDURE — 99232 SBSQ HOSP IP/OBS MODERATE 35: CPT | Performed by: INTERNAL MEDICINE

## 2020-07-14 PROCEDURE — 36415 COLL VENOUS BLD VENIPUNCTURE: CPT | Performed by: INTERNAL MEDICINE

## 2020-07-14 PROCEDURE — 97140 MANUAL THERAPY 1/> REGIONS: CPT | Mod: GP

## 2020-07-14 PROCEDURE — 80048 BASIC METABOLIC PNL TOTAL CA: CPT | Performed by: INTERNAL MEDICINE

## 2020-07-14 PROCEDURE — 85025 COMPLETE CBC W/AUTO DIFF WBC: CPT | Performed by: INTERNAL MEDICINE

## 2020-07-14 RX ORDER — FUROSEMIDE 10 MG/ML
40 INJECTION INTRAMUSCULAR; INTRAVENOUS ONCE
Status: COMPLETED | OUTPATIENT
Start: 2020-07-14 | End: 2020-07-14

## 2020-07-14 RX ADMIN — FUROSEMIDE 40 MG: 10 INJECTION, SOLUTION INTRAMUSCULAR; INTRAVENOUS at 17:10

## 2020-07-14 RX ADMIN — MICONAZOLE NITRATE: 20 POWDER TOPICAL at 21:06

## 2020-07-14 RX ADMIN — CEFTRIAXONE 2 G: 2 INJECTION, POWDER, FOR SOLUTION INTRAMUSCULAR; INTRAVENOUS at 17:10

## 2020-07-14 RX ADMIN — FUROSEMIDE 40 MG: 10 INJECTION, SOLUTION INTRAMUSCULAR; INTRAVENOUS at 08:29

## 2020-07-14 RX ADMIN — ENOXAPARIN SODIUM 40 MG: 40 INJECTION SUBCUTANEOUS at 21:06

## 2020-07-14 RX ADMIN — ENOXAPARIN SODIUM 40 MG: 40 INJECTION SUBCUTANEOUS at 08:39

## 2020-07-14 RX ADMIN — MICONAZOLE NITRATE: 20 POWDER TOPICAL at 08:42

## 2020-07-14 RX ADMIN — SILVER SULFADIAZINE: 10 CREAM TOPICAL at 08:42

## 2020-07-14 ASSESSMENT — MIFFLIN-ST. JEOR: SCORE: 2953.4

## 2020-07-14 ASSESSMENT — ACTIVITIES OF DAILY LIVING (ADL)
ADLS_ACUITY_SCORE: 16

## 2020-07-14 NOTE — PLAN OF CARE
Orientation: Alert and oriented x4  VSS. 97% on RA. Afebrile.    LS: diminished but clear throughout  GI: Passing gas. no BM. Denies N/V. Pt tolerating regular diet. 100 ml of water overnight. Pt down 4 lbs today.   : Adequate urine output.   Skin: bruises. Dry, flaky skin. Scabs to toes. BLE edema. Lymphedema wraps to bilateral LE throughout shift  Activity: SBA with walker. Up to bathroom overnight. Up in chair throughout shift with legs elevated on blue wedge pillows. Pt slept comfortably throughout shift.   Pain: 0/10. Denies pain throughout shift. No PRN interventions overnight.   Plan: Continue with current cares.

## 2020-07-14 NOTE — PROGRESS NOTES
[facility]  Infectious Disease Progress Note          Assessment and Plan:   IMPRESSION:   1.  A 59-year-old male with acute sepsis, severe left leg cellulitis with underlying lymphedema, very likely strep, but multiple organisms possible in this setting.   2.  Severe venous stasis and lymphedema including recurrent bilateral leg wounds, major blistering areas currently probably related to streptococcal cellulitis.   3.  Recurrent cellulitis in the past, although no prior hospitalizations, manageable as an outpatient prior to this.   4.  Morbid obesity.   5.  Acute renal failure related to sepsis.   6.  COVID-19 rule out, but given this alternative diagnosis despite some suggestive symptoms, unlikely COVID-19.      RECOMMENDATIONS:      1  Continue antibiotics, likely strep,   ceftriaxone while here  3.  Neg blood cultures.  Significant rigors and sepsis, so quite possible bacteremic at least transiently.   4.  Leg elevation as much as possible. Could do wraps also if he can tolerate  5.  Wound care as needed. Wd cx with 5 orgs doubt this is sig relative to leg cellulitis, both strep and staph aureus in cxs  , acinetobacter conceivable GNR pathogen for this is pansens  Ceftriaxone while here, then  augmentin for 10 days and home to include continued leg elevation, still impressive edema and skin changes but clearly better, WBc to nl        Interval History:   no new complaints and doing well; no cp, sob, n/v/d, or abd pain. Pain and leg better T down BC neg leg wd cx staph 2 GNR, strep etc WBc 7 K              Medications:       cefTRIAXone  2 g Intravenous Q24H     enoxaparin ANTICOAGULANT  40 mg Subcutaneous Q12H     miconazole   Topical BID     silver sulfADIAZINE   Topical Daily     sodium chloride (PF)  3 mL Intracatheter Q8H                  Physical Exam:   Blood pressure (!) 142/54, pulse 83, temperature 98.2  F (36.8  C), temperature source Temporal, resp. rate 18, height 1.943 m (6'  "4.5\"), weight (!) 201.9 kg (445 lb 1.6 oz), SpO2 98 %.  Wt Readings from Last 2 Encounters:   07/14/20 (!) 201.9 kg (445 lb 1.6 oz)   03/04/20 (!) 194.9 kg (429 lb 9.6 oz)     Vital Signs with Ranges  Temp:  [98  F (36.7  C)-99.5  F (37.5  C)] 98.2  F (36.8  C)  Pulse:  [83] 83  Heart Rate:  [82-93] 84  Resp:  [16-18] 18  BP: (118-142)/(54-67) 142/54  SpO2:  [95 %-98 %] 98 %    Constitutional: Awake, alert, cooperative, no apparent distress looks well   Lungs: Clear to auscultation bilaterally, no crackles or wheezing   Cardiovascular: Regular rate and rhythm, normal S1 and S2, and no murmur noted   Abdomen: Normal bowel sounds, soft, non-distended, non-tender   Skin: No rashes, no cyanosis, major edema L leg a bit better further   Other:           Data:   All microbiology laboratory data reviewed.  Recent Labs   Lab Test 07/14/20  0652 07/12/20  0633 07/11/20  0648 07/10/20  0706   WBC 7.7  --  11.3* 13.3*   HGB 10.6*  --  10.4* 10.5*   HCT 33.6*  --  32.6* 32.8*   MCV 96  --  94 94    286 289 306     Recent Labs   Lab Test 07/14/20  0652 07/13/20  0634 07/12/20  0633   CR 0.79 0.82 0.74     No lab results found.  Recent Labs   Lab Test 07/07/20  0930 07/06/20  1434 07/06/20  1340 07/03/18  0945   CULT Light growth  Acinetobacter baumannii complex  *  Moderate growth  Staphylococcus aureus  *  Light growth  Beta hemolytic Streptococcus group G  *  Light growth  Strain 2  Acinetobacter baumannii complex  *  Light growth  Stenotrophomonas maltophilia  *  Heavy growth  Corynebacterium striatum  Identification obtained by MALDI-TOF mass spectrometry research use only database. Test   characteristics determined and verified by the Infectious Diseases Diagnostic Laboratory   ([facility]) [place], [place].  Susceptibility testing not routinely done  *  Light growth  Staphylococcus aureus  Susceptibility testing done on previous specimen  *  Light growth  Normal skin alfonso   No growth No growth Heavy " growth  Escherichia coli  *  Heavy growth  Acinetobacter baumannii complex  *  Heavy growth  Staphylococcus aureus  *  Light growth  Beta hemolytic Streptococcus group G  *  Heavy growth  Corynebacterium striatum  Identification obtained by MALDI-TOF mass spectrometry research use only database. Test   characteristics determined and verified by the Infectious Diseases Diagnostic Laboratory   ([facility]) [place], [place].  Susceptibility testing not routinely done  *  Heavy growth  Enterococcus faecalis  *

## 2020-07-14 NOTE — PLAN OF CARE
Discharge Planner PT   Patient plan for discharge: home with resumption of lymphedema wrapping  Current status: PT: Patient continues to tolerate lymphedema wrapping well.  Patient's spouse present at beginning of session.  Spouse noted improvement of LE circumference/edema.  Spouse reports that they were doing wrapping up until day of admission.  Discussed possible new orders for lymphedema clinic.  Spouse at this time would like to continue with wound care clinic and to see if they can return to their previous lymphedema wrapping.  Discussed receiving the order for lymphedema clinic and waiting on evaluation for 1-2 weeks (while wrapping at home) to see how wound clinic goes.  Patient presenting with +2 right calf and foot, +3 left calf and foot.  RN completed wound care including skin cleaning and appyling lotion bilaterally.  Therapist completed to quick wrap to left foot and calf, with appropriate stretch and spacing to allow for gradient compression (large TG soft tube bandage layer for skin protection, followed by placement of 8/10/10/10 cm short stretch bandages from toes to proximal calf).  Therapist then donned patient's velcro calf garment from distal calf to proximal calf with overlap of lymph wrap.  Patient declined donning foot portion of velcro wrap at this time.  Encouraged patient to look at foot/ankle this evening; if edema is present, please don foot portion to right LE.  If patient does not tolerate wraps well, please remove wraps but keep LEs elevated. Patient educated on elevation during the day while resting and ambulation for mm pump action.   Barriers to return to prior living situation: none  Recommendations for discharge: Home with new orders for lymphedema OP clinic  Rationale for recommendations: Patient presents with increased bilateral LE edema with significant wounds at left LE.  Patient would benefit from OP lymphedema orders to reassess wrapping/assist with ordering new  compression garments.  Will continue to follow during inpatient stay to decrease lymphedema and improve patient's ability to mobilize.       Entered by: Patti Bello 07/14/2020 2:34 PM

## 2020-07-14 NOTE — PROGRESS NOTES
[facility]    Hospitalist Progress Note      Assessment & Plan   Da Mustafa is a 59 year old male who was admitted on 7/6/2020.    Summary of Stay:   Da Mustafa is a 59 year old male with prior medical history is significant for severe obesity (body mass index approximately 55) and related complications including glucose intolerance, chronic lower extremity edema, hypertension and possibly sleep apnea currently not treated due to intolerance of his mask who came to attention on 7/6/2020 with malaise and fevers with obvious left lower extremity cellulitis as well as acute kidney injury with creatinine of 3.2 from baseline of 0.9.  He was started on Ancef and clindamycin and has gradually clinically improved though multiple more days are expected here in the hospital.  Transitioned to sole ceftriaxone as per ID.  Creatinine is back to baseline at 0.9 or lower.       Remains admitted for ongoing IV diuresis.  Infection is improving and I think he could be changed to oral Augmentin upon discharge.     Received IV Lasix 40 mg twice daily on 7/13 which is a slight dose reduction from 3 times daily the past couple of days, rechecking kidney function tomorrow.  Weight is down to 445 lbs (461 at admission). Repeat one time IV lasix 40mg today.    Plan:    Left lower extremity cellulitis.  -ID consult appreciated.  - Related to chronic edema, venous stasis, lymphedema, obesity.  - Currently on IV ceftriaxone.  - Anticipate discharge tomorrow on oral Augmentin for 10 days.  - Diuresis depending on lower extremity edema.  - Patient and patient's wife were educated regarding wound care at home including outpatient follow-up plans.  Patient has seen specialist at lymphedema clinic and will call them again for establishing care once again with them.    Sepsis at admission.  -Improved.    Acute kidney injury  - Initial creatinine up to 3.2, now back to baseline.    Hypertension  - At baseline on  lisinopril which was held at the time of admission due to acute kidney injury.  -Potentially resume at the time of discharge.  -Also on 40 mg daily dose of Lasix at baseline.    Morbid obesity  - I emphasized the importance of losing weight.  - Per patient, he has seen a bariatric surgeon in the bariatric clinic.  Will establish care with them again.      DVT Prophylaxis: Enoxaparin (Lovenox) SQ  Code Status: Full Code  Expected discharge: Tomorrow,    Narciso Sterling MD  Text Page (7am - 6pm, M-F)    Interval History   Patient was evaluated with nursing staff. Overnight issues discussed.    Review of systems:  No nausea or vomiting.  No abdominal pain.  No diarrhea.  No chest pain/palpitations.  No new cough/shortness of breath.  No headache/visual disturbance/new weakness.    -Data reviewed today: Labs and medications.    Physical Exam   Temp: 98.2  F (36.8  C) Temp src: Temporal BP: (!) 142/54 Pulse: 83 Heart Rate: 84 Resp: 18 SpO2: 98 % O2 Device: None (Room air)    Vitals:    07/12/20 0636 07/13/20 0602 07/14/20 0514   Weight: (!) 207.5 kg (457 lb 6.4 oz) (!) 203.7 kg (449 lb 1.6 oz) (!) 201.9 kg (445 lb 1.6 oz)     Vital Signs with Ranges  Temp:  [98  F (36.7  C)-99.5  F (37.5  C)] 98.2  F (36.8  C)  Pulse:  [83] 83  Heart Rate:  [82-93] 84  Resp:  [16-18] 18  BP: (118-142)/(54-67) 142/54  SpO2:  [95 %-98 %] 98 %  I/O last 3 completed shifts:  In: 100 [P.O.:100]  Out: 2750 [Urine:2750]    Constitutional: Awake, alert, cooperative, no apparent distress  HEENT: Trachea midline, sclera is clear   Respiratory: Clear to auscultation bilaterally, no crackles or wheezing  Cardiovascular: Regular rate and rhythm, normal S1 and S2, and no murmur noted  GI: Normal bowel sounds, soft, non-distended, non-tender  Extremities: Left lower extremity edema greater than the right.  Areas of cellulitis.  No active discharge.  Dry skin present.  Well-perfused extremities.    Medications       cefTRIAXone  2 g Intravenous Q24H      enoxaparin ANTICOAGULANT  40 mg Subcutaneous Q12H     miconazole   Topical BID     silver sulfADIAZINE   Topical Daily     sodium chloride (PF)  3 mL Intracatheter Q8H       Data   Recent Labs   Lab 07/14/20  0652 07/13/20  0634 07/12/20  0633 07/11/20  0648 07/10/20  0706   WBC 7.7  --   --  11.3* 13.3*   HGB 10.6*  --   --  10.4* 10.5*   MCV 96  --   --  94 94     --  286 289 306    137 139 138 138   POTASSIUM 4.1 3.9 4.3 4.3 4.3   CHLORIDE 105 103 106 108 106   CO2 30 31 30 29 29   BUN 18 14 11 10 11   CR 0.79 0.82 0.74 0.72 0.84   ANIONGAP 3 3 3 1* 3   ZEINAB 7.9* 8.0* 7.9* 7.8* 7.9*   * 106* 101* 104* 109*       No results found for this or any previous visit (from the past 24 hour(s)).

## 2020-07-14 NOTE — CONSULTS
CTS     CTS contacted by MD to assist patient in scheduling Wound Care Clinic appointment. I called [facility]# [phone]. Wound Clinic will need a referral placed in the work cue or faxed to fax#[phone]. Once referral received, the Wound Clinic will call the patient to schedule his initial appointment. MD paged for initial referral request.     Quang Rey RN BSN PHN CCM   Inpatient Care Coordination   [facility]   [phone]

## 2020-07-14 NOTE — PLAN OF CARE
RN 7pm-11pm.     Pt A/O. Denies pain. Up with SBA. Voiding in good amounts. Regular diet w/ 2000 ml fluid restriction. BLE covered with lymph wraps and elevated. Will continue to monitor.

## 2020-07-14 NOTE — DISCHARGE INSTRUCTIONS
[facility] [place]    [facility]   [address] [place] [address]   Phone:[phone]

## 2020-07-14 NOTE — PLAN OF CARE
Pt vss, lungs are clear, pt has been encouraged to use IS hourly.   BS+, eating and drinking well. BM today. Had 900ml for me today. Pt aware of what is left for today.   CMS+, wound dressing changed. Bilateral legs red and blotchy, lymphedema wraps done. Legs elevated on blue foam pads.  ID following - cont antibiotics this afternoon.   No pain.  Up assist of 1 and walker.   Voiding today and 1 large BM  Pt remains up in chair and refuses the bed so education on pressure changes and relief.   Following care plan and pt was educated on care plan expectations  Plans to d/c to home tomorrow with wifes help  Will cont to monitor.

## 2020-07-15 ENCOUNTER — TELEPHONE (OUTPATIENT)
Dept: WOUND CARE | Facility: CLINIC | Age: 60
End: 2020-07-15

## 2020-07-15 VITALS
HEART RATE: 83 BPM | HEIGHT: 77 IN | TEMPERATURE: 97.5 F | BODY MASS INDEX: 37.19 KG/M2 | DIASTOLIC BLOOD PRESSURE: 55 MMHG | RESPIRATION RATE: 16 BRPM | WEIGHT: 315 LBS | SYSTOLIC BLOOD PRESSURE: 118 MMHG | OXYGEN SATURATION: 93 %

## 2020-07-15 LAB — PLATELET # BLD AUTO: 268 10E9/L (ref 150–450)

## 2020-07-15 PROCEDURE — 25000132 ZZH RX MED GY IP 250 OP 250 PS 637: Performed by: INTERNAL MEDICINE

## 2020-07-15 PROCEDURE — 25000128 H RX IP 250 OP 636: Performed by: INTERNAL MEDICINE

## 2020-07-15 PROCEDURE — 99239 HOSP IP/OBS DSCHRG MGMT >30: CPT | Performed by: INTERNAL MEDICINE

## 2020-07-15 PROCEDURE — 85049 AUTOMATED PLATELET COUNT: CPT | Performed by: INTERNAL MEDICINE

## 2020-07-15 PROCEDURE — 36415 COLL VENOUS BLD VENIPUNCTURE: CPT | Performed by: INTERNAL MEDICINE

## 2020-07-15 RX ADMIN — ACETAMINOPHEN 650 MG: 325 TABLET, FILM COATED ORAL at 05:32

## 2020-07-15 RX ADMIN — SILVER SULFADIAZINE: 10 CREAM TOPICAL at 09:44

## 2020-07-15 RX ADMIN — MICONAZOLE NITRATE: 20 POWDER TOPICAL at 09:44

## 2020-07-15 RX ADMIN — ENOXAPARIN SODIUM 40 MG: 40 INJECTION SUBCUTANEOUS at 09:43

## 2020-07-15 ASSESSMENT — ACTIVITIES OF DAILY LIVING (ADL)
ADLS_ACUITY_SCORE: 16

## 2020-07-15 ASSESSMENT — MIFFLIN-ST. JEOR: SCORE: 2943.42

## 2020-07-15 NOTE — PLAN OF CARE
Pt ambulating in the cordero and to the bathroom, denies pain, refusing to sleep or rest in the bed as he claim that he sleeps better in the recliner. Pt educated about the importance of the APM mattress, but he still declined. Wraps patent, fluid restrictions and strict IO. Will keep monitoring.

## 2020-07-15 NOTE — PLAN OF CARE
"/59 (BP Location: Right arm)   Pulse 83   Temp 98  F (36.7  C) (Temporal)   Resp 16   Ht 1.943 m (6' 4.5\")   Wt (!) 201.9 kg (445 lb 1.6 oz)   SpO2 96%   BMI 53.47 kg/m       Alert and oriented x4. Dressings intact. Bilateral lower extremities elevated. Denied pain except generalized body aches. Managed with Tylenol. Voiding adequately. Fluid restriction maintained.  "

## 2020-07-15 NOTE — DISCHARGE SUMMARY
[facility]    Discharge Summary  Hospitalist    Date of Admission:  7/6/2020  Date of Discharge:  7/15/2020 11:41 AM  Discharging Provider: Narciso Sterling MD  Date of Service (when I saw the patient): 07/15/20    Discharge Diagnoses   Left lower extremity cellulitis.  Severe sepsis at admission.  Chronic edema of the lower extremities.  With lymphedema, venous stasis.  Morbid obesity With BMI approximately 55.  Acute kidney injury at admission.  Resolved.  Hypertension.      History of Present Illness      Da Mustafa is a 59 year old male with prior medical history is significant for severe obesity (body mass index approximately 55) and related complications including glucose intolerance, chronic lower extremity edema, hypertension and possibly sleep apnea currently not treated due to intolerance of his mask who came to attention on 7/6/2020 with malaise and fevers with obvious left lower extremity cellulitis as well as acute kidney injury with creatinine of 3.2 from baseline of 0.9.    Hospital Course     Patient was admitted to internal medicine service.  Initially he was started on IV cefazolin and clindamycin.  Infectious diseases consultation was obtained.  Patient was then transitioned to IV ceftriaxone.    Patient was evaluated by infectious diseases specialist Dr. Dean.  Wound culture showed polymicrobial cultures.  Patient has responded well to the antibiotic regime.  Severe sepsis has resolved.  Per ID recommendations, patient is being discharged on 10 days of oral Augmentin.    During the hospital stay, patient was also seen by wound care team.  I personally emphasized the importance of further skin care including wound care, lymphedema care in the outpatient setting.  Patient and patient's wife were given education regarding skin care as well as using the wraps for his lymphedema.  He has been seen in lymphedema clinic in the past and he will make follow-up appointments to see them  again.  I also emphasized the importance of losing weight which is the major issue here.  In the past he has seen a bariatric surgeon.  Has been to a weight loss clinic.  He will make to follow-up appointments once again with them.  Recommended to have leg elevations as much as possible.  He understands that preventing further infections would be critical.    During the hospital stay patient was also diuresed with IV Lasix, once the creatinine improved.  At baseline he takes 40 mg oral Lasix which was resumed.      For hypertension he takes lisinopril. Recommended to resume his lisinopril at discharge.  His creatinine has been normal for the last several days.      I personally evaluated and examined the patient on the day of discharge.    Narciso Sterling MD      Pending Results   These results will be followed up by PCP  Unresulted Labs Ordered in the Past 30 Days of this Admission     No orders found from 6/6/2020 to 7/7/2020.               Primary Care Physician   Robel Morris        Discharge Disposition   Discharged to home  Condition at discharge: Stable    Consultations This Hospital Stay   PHARMACY TO DOSE VANCO  WOUND OSTOMY CONTINENCE NURSE  IP CONSULT  LYMPHEDEMA THERAPY IP CONSULT  INFECTIOUS DISEASES IP CONSULT  WOUND OSTOMY CONTINENCE NURSE  IP CONSULT    Time Spent on this Encounter   Discharge time: greater than 30 minutes.    Discharge Orders      Reason for your hospital stay    Left leg cellulitis. OMAR. Sepsis     Follow-up and recommended labs and tests     Follow up with primary care provider, Robel Morris, within 7 days for hospital follow- up.     Activity    Your activity upon discharge: activity as tolerated     Diet    Follow this diet upon discharge: Regular Diet Adult     Discharge Medications   Discharge Medication List as of 7/15/2020 10:19 AM      CONTINUE these medications which have CHANGED    Details   amoxicillin-clavulanate (AUGMENTIN) 875-125 MG tablet Take 1 tablet  by mouth 2 times daily for 10 days, Disp-20 tablet,R-0, E-Prescribe         CONTINUE these medications which have NOT CHANGED    Details   fluticasone (FLONASE) 50 MCG/ACT nasal spray Spray 1 spray into both nostrils daily as needed for rhinitis or allergies, Historical      furosemide (LASIX) 40 MG tablet Take 1 tablet (40 mg) by mouth daily, Disp-90 tablet,R-0, E-Prescribe      garlic 150 MG TABS tablet Take 150 mg by mouth daily, Historical      glucosamine-chondroitin 500-400 MG CAPS per capsule Take 1 capsule by mouth daily, Historical      lisinopril (ZESTRIL) 40 MG tablet Take 1 tablet (40 mg) by mouth daily, Disp-90 tablet,R-0, E-Prescribe      loratadine (CLARITIN) 10 MG tablet Take 10 mg by mouth daily, Historical      multivitamin w/minerals (MULTI-VITAMIN) tablet Take 1 tablet by mouth daily, Historical      silver sulfADIAZINE (SILVADENE) 1 % external cream Apply topically 2 times daily as neededHistorical           Allergies   Allergies   Allergen Reactions     No Known Drug Allergies      Seasonal Allergies      Data   Most Recent 3 CBC's:  Recent Labs   Lab Test 07/15/20  0714 07/14/20  0652 07/12/20  0633 07/11/20  0648 07/10/20  0706   WBC  --  7.7  --  11.3* 13.3*   HGB  --  10.6*  --  10.4* 10.5*   MCV  --  96  --  94 94    271 286 289 306      Most Recent 3 BMP's:  Recent Labs   Lab Test 07/14/20  0652 07/13/20  0634 07/12/20  0633    137 139   POTASSIUM 4.1 3.9 4.3   CHLORIDE 105 103 106   CO2 30 31 30   BUN 18 14 11   CR 0.79 0.82 0.74   ANIONGAP 3 3 3   ZEINAB 7.9* 8.0* 7.9*   * 106* 101*     Most Recent 2 LFT's:  Recent Labs   Lab Test 07/06/20  1639 04/24/19  1009   AST 12 16   ALT 18 27   ALKPHOS 101 82   BILITOTAL 0.4 0.4     Most Recent INR's and Anticoagulation Dosing History:  Anticoagulation Dose History     Recent Dosing and Labs Latest Ref Rng & Units 7/6/2020    INR 0.86 - 1.14 1.10        Most Recent 3 Troponin's:No lab results found.  Most Recent Cholesterol  Panel:  Recent Labs   Lab Test 04/24/19  1009   CHOL 145   LDL 89   HDL 38*   TRIG 90     Most Recent 6 Bacteria Isolates From Any Culture (See EPIC Reports for Culture Details):  Recent Labs   Lab Test 07/07/20  0930 07/06/20  1434 07/06/20  1340 07/03/18  0945   CULT Light growth  Acinetobacter baumannii complex  *  Moderate growth  Staphylococcus aureus  *  Light growth  Beta hemolytic Streptococcus group G  *  Light growth  Strain 2  Acinetobacter baumannii complex  *  Light growth  Stenotrophomonas maltophilia  *  Heavy growth  Corynebacterium striatum  Identification obtained by MALDI-TOF mass spectrometry research use only database. Test   characteristics determined and verified by the [facility]   ([facility]) [place], [place].  Susceptibility testing not routinely done  *  Light growth  Staphylococcus aureus  Susceptibility testing done on previous specimen  *  Light growth  Normal skin alfonso   No growth No growth Heavy growth  Escherichia coli  *  Heavy growth  Acinetobacter baumannii complex  *  Heavy growth  Staphylococcus aureus  *  Light growth  Beta hemolytic Streptococcus group G  *  Heavy growth  Corynebacterium striatum  Identification obtained by MALDI-TOF mass spectrometry research use only database. Test   characteristics determined and verified by the [facility]   ([facility]) [place], [place].  Susceptibility testing not routinely done  *  Heavy growth  Enterococcus faecalis  *     Most Recent TSH, T4 and A1c Labs:  Recent Labs   Lab Test 07/07/20  0808 09/20/19  0950   TSH  --  1.87   A1C 5.5  --

## 2020-07-15 NOTE — TELEPHONE ENCOUNTER
[facility] Wound    Who is the name of the provider?:  New      What is the location you see this provider at?: [place]    Reason for call:  Discarged from [facility] today.  Referred to Wound Healing for non-healing wounds on ankles.    Can we leave a detailed message on this number?  YES

## 2020-07-15 NOTE — TELEPHONE ENCOUNTER
See pics in Media tab from 7-7-2020 for bilateral lower extremity ulcers. Schedule in clinic with Amber Valderrama or Charissa

## 2020-07-15 NOTE — PLAN OF CARE
Physical Therapy Discharge Summary    Reason for therapy discharge:    Discharged to home with referral for wound care/outpatient lymphedema    Progress towards therapy goal(s). See goals on Care Plan in [facility] electronic health record for goal details.  Goals partially met.  Barriers to achieving goals:   discharge from facility.    Therapy recommendation(s):    Patient would benefit from wound care consult to address bilateral LE wounds.  Would benefit from outpatient lymphedema referral for assistance in ordering nex velcor compression garments and to provide updated wrapping recommendations

## 2020-07-15 NOTE — PLAN OF CARE
Pt discharged home with wife as transport.  Discharge instructions reviewed with patient, he verbalized understanding  and all questions were answered.  IV removed prior to discharge.  Wound care done prior to discharge and legs wrapped.  Pt taken off unit via wheelchair.  Pt discharged with all personal belongings.  Medications e-scribed.

## 2020-07-19 ENCOUNTER — MYC MEDICAL ADVICE (OUTPATIENT)
Dept: FAMILY MEDICINE | Facility: CLINIC | Age: 60
End: 2020-07-19

## 2020-07-19 DIAGNOSIS — I10 ESSENTIAL HYPERTENSION WITH GOAL BLOOD PRESSURE LESS THAN 140/90: ICD-10-CM

## 2020-07-19 DIAGNOSIS — R60.0 PERIPHERAL EDEMA: ICD-10-CM

## 2020-07-20 RX ORDER — SILVER SULFADIAZINE 10 MG/G
CREAM TOPICAL
Qty: 85 G | Refills: 1 | Status: SHIPPED | OUTPATIENT
Start: 2020-07-20

## 2020-07-20 RX ORDER — FUROSEMIDE 40 MG
TABLET ORAL
Qty: 90 TABLET | Refills: 0 | Status: SHIPPED | OUTPATIENT
Start: 2020-07-20 | End: 2020-10-15

## 2020-07-20 NOTE — TELEPHONE ENCOUNTER
Pending Prescriptions:                       Disp   Refills    furosemide (LASIX) 40 MG tablet [Pharmacy*90 tab*0            Sig: TAKE 1 TABLET BY MOUTH EVERY DAY    silver sulfADIAZINE (SILVADENE) 1 % exter*85 g   1            Sig: APPLY TO AFFECTED AREA TWICE A DAY    Routing refill request to provider for review/approval because:  Silvadene  not on the [facility] refill protocol     Vitaly Estrada sent [employer] message re refills, is scheduled for hospital follow up on 7/22/20 with Dr. Lugo.   Ezio Daigle RN

## 2020-07-22 ENCOUNTER — APPOINTMENT (OUTPATIENT)
Dept: LAB | Facility: CLINIC | Age: 60
End: 2020-07-22
Payer: COMMERCIAL

## 2020-07-22 ENCOUNTER — OFFICE VISIT (OUTPATIENT)
Dept: FAMILY MEDICINE | Facility: CLINIC | Age: 60
End: 2020-07-22
Payer: COMMERCIAL

## 2020-07-22 VITALS
HEART RATE: 80 BPM | TEMPERATURE: 98.6 F | SYSTOLIC BLOOD PRESSURE: 110 MMHG | BODY MASS INDEX: 53.7 KG/M2 | DIASTOLIC BLOOD PRESSURE: 76 MMHG | RESPIRATION RATE: 16 BRPM | WEIGHT: 315 LBS

## 2020-07-22 DIAGNOSIS — L89.312 DECUBITUS ULCER OF RIGHT BUTTOCK, STAGE 2 (H): ICD-10-CM

## 2020-07-22 DIAGNOSIS — L03.116 CELLULITIS OF LEFT LOWER EXTREMITY: Primary | ICD-10-CM

## 2020-07-22 DIAGNOSIS — N17.9 AKI (ACUTE KIDNEY INJURY) (H): ICD-10-CM

## 2020-07-22 DIAGNOSIS — L97.929 ULCER OF LOWER LIMB, LEFT, WITH UNSPECIFIED SEVERITY (H): ICD-10-CM

## 2020-07-22 LAB
BASOPHILS # BLD AUTO: 0 10E9/L (ref 0–0.2)
BASOPHILS NFR BLD AUTO: 0.6 %
DIFFERENTIAL METHOD BLD: ABNORMAL
EOSINOPHIL # BLD AUTO: 0.1 10E9/L (ref 0–0.7)
EOSINOPHIL NFR BLD AUTO: 1.3 %
ERYTHROCYTE [DISTWIDTH] IN BLOOD BY AUTOMATED COUNT: 13.1 % (ref 10–15)
HCT VFR BLD AUTO: 35.2 % (ref 40–53)
HGB BLD-MCNC: 11.4 G/DL (ref 13.3–17.7)
LYMPHOCYTES # BLD AUTO: 1.6 10E9/L (ref 0.8–5.3)
LYMPHOCYTES NFR BLD AUTO: 22.7 %
MCH RBC QN AUTO: 30.5 PG (ref 26.5–33)
MCHC RBC AUTO-ENTMCNC: 32.4 G/DL (ref 31.5–36.5)
MCV RBC AUTO: 94 FL (ref 78–100)
MONOCYTES # BLD AUTO: 0.7 10E9/L (ref 0–1.3)
MONOCYTES NFR BLD AUTO: 9.4 %
NEUTROPHILS # BLD AUTO: 4.6 10E9/L (ref 1.6–8.3)
NEUTROPHILS NFR BLD AUTO: 66 %
PLATELET # BLD AUTO: 284 10E9/L (ref 150–450)
RBC # BLD AUTO: 3.74 10E12/L (ref 4.4–5.9)
WBC # BLD AUTO: 7 10E9/L (ref 4–11)

## 2020-07-22 PROCEDURE — 80048 BASIC METABOLIC PNL TOTAL CA: CPT | Performed by: FAMILY MEDICINE

## 2020-07-22 PROCEDURE — 99495 TRANSJ CARE MGMT MOD F2F 14D: CPT | Performed by: FAMILY MEDICINE

## 2020-07-22 PROCEDURE — 85025 COMPLETE CBC W/AUTO DIFF WBC: CPT | Performed by: FAMILY MEDICINE

## 2020-07-22 PROCEDURE — 36415 COLL VENOUS BLD VENIPUNCTURE: CPT | Performed by: FAMILY MEDICINE

## 2020-07-22 NOTE — PROGRESS NOTES
Subjective     Da Mustafa is a 59 year old male who presents to clinic today for the following health issues: Follow-up treatment for cellulitis.    History of Present Illness        He eats 2-3 servings of fruits and vegetables daily.He consumes 0 sweetened beverage(s) daily.He exercises with enough effort to increase his heart rate 9 or less minutes per day.  He exercises with enough effort to increase his heart rate 3 or less days per week.   He is taking medications regularly.           Hospital Follow-up Visit:    Hospital/Nursing Home/IP Rehab Facility: [facility]  Date of Admission: 7/6/20  Date of Discharge: 7/15/20  Reason(s) for Admission: leg pain, infection      Was your hospitalization related to COVID-19? No   Problems taking medications regularly:  None  Medication changes since discharge: None  Problems adhering to non-medication therapy:  None    Summary of hospitalization:  [facility] discharge summary reviewed  Diagnostic Tests/Treatments reviewed.  Follow up needed: follow up labs.  Other Healthcare Providers Involved in Patient s Care: None  Update since discharge: improved. He is move better. Pain is improved.   Post Discharge Medication Reconciliation: discharge medications reconciled, continue medications without change.  Plan of care communicated with patient     Physician HPI: Patient is seen in clinic for follow-up management of cellulitis of his left lower extremity.  He has several areas of superficial ulcers on bilateral lower extremities.  His wife thinks the area of redness on his left lower extremity has increased since the prior day.  He was seen by wound care in the hospital, and has an appointment to follow-up with them 1 week from this exam.  He definitely has less pain now in his lower extremity from initial management in hospital.  No recent fevers.  He has had some chills at night.  Since starting treatment with his oral antibiotic, he has had  looser stools, but they are not liquid.    Review of Systems   See history of present illness.    In addition, no recent shortness of breath, or cough.  Open wounds are being treated with Silvadene cream covered with nonstick dressings.    Patient noted a small amount of blood on his right buttock over the past couple days.  His wife saw a skin sore on the right buttock, which patient applied a bandage over at home.  No complaints of discomfort in this area.      Objective    /76 (BP Location: Right arm, Patient Position: Sitting, Cuff Size: Adult Large)   Pulse 80   Temp 98.6  F (37  C) (Oral)   Resp 16   Wt (!) 202.8 kg (447 lb)   BMI 53.70 kg/m    Body mass index is 53.7 kg/m .  Physical Exam   Vital signs reviewed.  Patient is in no acute appearing distress.  Breathing appears nonlabored.  Patient is alert and oriented ×3.    Heart: Heart rate is regular without murmur.  Lungs: Lungs are clear to auscultation with good airflow bilaterally.    Skin/extremity exam: Patient has two stage 2 skin ulcers on left anterior shin, and one stage 2 ulcer on right anterior shin.  They range in size from the smallest one on right shin being about 0.5 cm diameter, to the largest one on the left shin being about 3 cm diameter.  All are very superficial, not going all of the way through the dermis.  I did not see any purulent drainage over the wounds.  They were being treated with Silvadene cream, nonstick dressings, Kerlix to hold these on, and then over all of this lymphedema compression leg wraps.  There is erythema throughout the entire left lower leg, which extended proximally to about 8 to 10 cm beyond the knee.  Outlined the border of this with a skin marker.  No erythema noted to the right lower extremity.  Patient had significant edema which is chronic to the bilateral lower legs and ankles.    Examination right buttock: Very superficial stage 2 ulcer with slight bleeding right medial buttock.  No associated  erythema.  No purulent drainage.    Results for orders placed or performed in visit on 07/22/20   Basic metabolic panel  (Ca, Cl, CO2, Creat, Gluc, K, Na, BUN)     Status: None   Result Value Ref Range    Sodium 136 133 - 144 mmol/L    Potassium 3.8 3.4 - 5.3 mmol/L    Chloride 104 94 - 109 mmol/L    Carbon Dioxide 27 20 - 32 mmol/L    Anion Gap 5 3 - 14 mmol/L    Glucose 85 70 - 99 mg/dL    Urea Nitrogen 12 7 - 30 mg/dL    Creatinine 0.74 0.66 - 1.25 mg/dL    GFR Estimate >90 >60 mL/min/[1.73_m2]    GFR Estimate If Black >90 >60 mL/min/[1.73_m2]    Calcium 8.6 8.5 - 10.1 mg/dL   CBC with platelets and differential     Status: Abnormal   Result Value Ref Range    WBC 7.0 4.0 - 11.0 10e9/L    RBC Count 3.74 (L) 4.4 - 5.9 10e12/L    Hemoglobin 11.4 (L) 13.3 - 17.7 g/dL    Hematocrit 35.2 (L) 40.0 - 53.0 %    MCV 94 78 - 100 fl    MCH 30.5 26.5 - 33.0 pg    MCHC 32.4 31.5 - 36.5 g/dL    RDW 13.1 10.0 - 15.0 %    Platelet Count 284 150 - 450 10e9/L    % Neutrophils 66.0 %    % Lymphocytes 22.7 %    % Monocytes 9.4 %    % Eosinophils 1.3 %    % Basophils 0.6 %    Absolute Neutrophil 4.6 1.6 - 8.3 10e9/L    Absolute Lymphocytes 1.6 0.8 - 5.3 10e9/L    Absolute Monocytes 0.7 0.0 - 1.3 10e9/L    Absolute Eosinophils 0.1 0.0 - 0.7 10e9/L    Absolute Basophils 0.0 0.0 - 0.2 10e9/L    Diff Method Automated Method            Assessment & Plan     1. Cellulitis of left lower extremity  No change in treatment for now while awaiting the results of the lab studies.  - CBC with platelets and differential    2. Ulcer of lower limb, left, with unspecified severity (H)  No change in current treatment, with patient scheduled for wound care follow-up.  - CBC with platelets and differential    3. OMAR (acute kidney injury) (H)  We will recheck renal function.  - Basic metabolic panel  (Ca, Cl, CO2, Creat, Gluc, K, Na, BUN)    4. Decubitus ulcer of right buttock, stage 2 (H)  Patient advised to use bacitracin ointment cover with  bandage.      Patient Instructions   Continue the silvadene dressings on lower extremities. Over the right buttock you can use bacitracin over the small skin wound covered with band aid. Try to clean this area daily. I will let you know your lab results via [employer]. Continue your current oral medications.         Return in about 1 week (around 7/29/2020) for with wound care team..    Noam Lugo, DO  [facility]

## 2020-07-22 NOTE — PATIENT INSTRUCTIONS
Continue the silvadene dressings on lower extremities. Over the right buttock you can use bacitracin over the small skin wound covered with band aid. Try to clean this area daily. I will let you know your lab results via [employer]. Continue your current oral medications.

## 2020-07-23 LAB
ANION GAP SERPL CALCULATED.3IONS-SCNC: 5 MMOL/L (ref 3–14)
BUN SERPL-MCNC: 12 MG/DL (ref 7–30)
CALCIUM SERPL-MCNC: 8.6 MG/DL (ref 8.5–10.1)
CHLORIDE SERPL-SCNC: 104 MMOL/L (ref 94–109)
CO2 SERPL-SCNC: 27 MMOL/L (ref 20–32)
CREAT SERPL-MCNC: 0.74 MG/DL (ref 0.66–1.25)
GFR SERPL CREATININE-BSD FRML MDRD: >90 ML/MIN/{1.73_M2}
GLUCOSE SERPL-MCNC: 85 MG/DL (ref 70–99)
POTASSIUM SERPL-SCNC: 3.8 MMOL/L (ref 3.4–5.3)
SODIUM SERPL-SCNC: 136 MMOL/L (ref 133–144)

## 2020-07-29 ENCOUNTER — HOSPITAL ENCOUNTER (OUTPATIENT)
Dept: WOUND CARE | Facility: CLINIC | Age: 60
Discharge: HOME | End: 2020-07-29
Attending: SURGERY | Admitting: SURGERY
Payer: COMMERCIAL

## 2020-07-29 VITALS
RESPIRATION RATE: 18 BRPM | HEART RATE: 88 BPM | DIASTOLIC BLOOD PRESSURE: 84 MMHG | SYSTOLIC BLOOD PRESSURE: 155 MMHG | TEMPERATURE: 98.3 F

## 2020-07-29 DIAGNOSIS — R60.0 PERIPHERAL EDEMA: ICD-10-CM

## 2020-07-29 DIAGNOSIS — E66.01 MORBID OBESITY WITH BMI OF 50.0-59.9, ADULT (H): ICD-10-CM

## 2020-07-29 DIAGNOSIS — I87.2 VENOUS STASIS DERMATITIS OF BOTH LOWER EXTREMITIES: Primary | ICD-10-CM

## 2020-07-29 DIAGNOSIS — I89.0 LYMPHEDEMA OF BOTH LOWER EXTREMITIES: ICD-10-CM

## 2020-07-29 PROCEDURE — 99203 OFFICE O/P NEW LOW 30 MIN: CPT | Performed by: SURGERY

## 2020-07-29 PROCEDURE — G0463 HOSPITAL OUTPT CLINIC VISIT: HCPCS

## 2020-07-29 RX ORDER — DIOSMIN COMPLEX NO.1 630 MG
1 TABLET ORAL DAILY
Qty: 90 TABLET | Refills: 3 | Status: SHIPPED | OUTPATIENT
Start: 2020-07-29

## 2020-07-29 NOTE — DISCHARGE INSTRUCTIONS
[facility]  [address] [address]    Call us at [phone] if you have any questions about your wounds, have redness or swelling around your wound, have a fever of 101 or greater or if you have any other problems or concerns. We answer the phone Monday through Friday 8 am to 4 pm, please leave a message as we check the voicemail frequently throughout the day.     Da Mustafa      1960  1. Time restriction for eating (12-8 pm)  2. No Soda - Not even Diet Soda   3. Walk 10,000 steps a day  4. Do upper body weight exercises    Use lymphedema pump one hour twice a day than one hour the rest of your life.     Apply Edemawear blue strip from base of toe to ankle, than yellow stripe from ankle to knee.   Compression:   You have a compression coolflex wrap pulled to the second line on the strap is supposed to be removed at night and put back on first thing in the morning.   Please remove compression dressing if toes turn blue and/or tingle and can not be relieved by raising the leg for one hour.       Lymphedema Therapy call the scheduling line at [phone]. There are different locations to choose from.   [facility] office for a venous ultrasound of your veins. Call [phone] to schedule an appointment.      BALJIT Valdrerama M.D.. July 29, 2020

## 2020-07-29 NOTE — PROGRESS NOTES
Patient arrived for wound care visit. Certified Wound Care Nurse time spent evaluating patient record, completed a full evaluation and documented wound(s) & jasson-wound skin; provided recommendation based on treatment plan. Applied dressing, reviewed discharge instructions, patient education, and discussed plan of care with appropriate medical team staff members and patient and/or family members.

## 2020-07-29 NOTE — PROGRESS NOTES
[facility] Progress Note    Subject: Da Mustafa consultation for evaluation of lymphorrhea and chronic lymphedema left lower extremity.  Patient is a chronically severely obese 59-year-old male, currently working from home during pandemic, has been engaged in weight modification efforts with diet change though states pandemic has altered his typical weight modification efforts in both eating style, more snacking, and not as much exercise.  Recent hospitalization for sepsis due to cellulitis of the left lower extremity.  Has persistent lymphorrhea.  Has seen a certified lymphedema therapist approximately 3 years ago for lymphedema management.  Does not have a regular routine of stretching, exercise, manual lymphatic drainage, etc. for management of lymphedema, no lymphedema pump, has difficulty pulling on compression socks.  Denies history of cerebrovascular accident, congestive heart failure, pulmonary hypertension or pulmonary abnormalities, hepatic renal or GI dysfunction, nondiabetic, does not utilize tobacco.  Has sleep apnea.  Has not had consultation with bariatric surgeon for consideration of gastric sleeve.  Within the past year, highest walking total was approximately 5-6000 steps in a 24-hour period, though did not do consistently over the course of a week.  Hypertension, hyperlipidemia, the complete was systems otherwise negative.  Occasions reviewed, on chronic Lasix, recent course of Augmentin for leg cellulitis, on lisinopril, not on amlodipine.  Nuys history of malignancy, deep venous thromboses, no previous utilization of anticoagulants.    PMH:   Past Medical History:   Diagnosis Date     Class 3 obesity due to excess calories without serious comorbidity with body mass index (BMI) of 50.0 to 59.9 in adult 12/27/2017     Edema      Essential hypertension with goal blood pressure less than 140/90 7/25/2016     Essential hypertension, benign      Hyperlipidemia LDL goal  <160 7/28/2015     Hypertension goal BP (blood pressure) < 140/90 4/25/2013     Obesity, unspecified      Seasonal allergic rhinitis 5/19/2014     Sleep apnea      Patient Active Problem List   Diagnosis     Edema     Family history of diabetes mellitus     Seasonal allergic rhinitis     Hyperlipidemia LDL goal <160     Essential hypertension with goal blood pressure less than 140/90     Arthritis of left foot     Class 3 obesity due to excess calories without serious comorbidity with body mass index (BMI) of 50.0 to 59.9 in adult     Venous stasis dermatitis of both lower extremities     Peripheral edema     Lymphedema     Morbid obesity with BMI of 50.0-59.9, adult (H)     Severe sepsis (H)     Lymphedema of both lower extremities     Social Hx:   Social History     Socioeconomic History     Marital status:      Spouse name: Not on file     Number of children: Not on file     Years of education: Not on file     Highest education level: Not on file   Occupational History     Not on file   Social Needs     Financial resource strain: Not on file     Food insecurity     Worry: Not on file     Inability: Not on file     Transportation needs     Medical: Not on file     Non-medical: Not on file   Tobacco Use     Smoking status: Never Smoker     Smokeless tobacco: Never Used   Substance and Sexual Activity     Alcohol use: Yes     Comment: rare     Drug use: No     Sexual activity: Yes     Partners: Female   Lifestyle     Physical activity     Days per week: Not on file     Minutes per session: Not on file     Stress: Not on file   Relationships     Social connections     Talks on phone: Not on file     Gets together: Not on file     Attends Jewish service: Not on file     Active member of club or organization: Not on file     Attends meetings of clubs or organizations: Not on file     Relationship status: Not on file     Intimate partner violence     Fear of current or ex partner: Not on file     Emotionally  abused: Not on file     Physically abused: Not on file     Forced sexual activity: Not on file   Other Topics Concern     Parent/sibling w/ CABG, MI or angioplasty before 65F 55M? No   Social History Narrative     Not on file       Surgical Hx:   Past Surgical History:   Procedure Laterality Date     C NONSPECIFIC PROCEDURE       COLONOSCOPY  12/4/2013    Procedure: COLONOSCOPY;  Colonoscopy  ;  Surgeon: Beny Rich MD;  Location:  GI       Allergies:    Allergies   Allergen Reactions     No Known Drug Allergies      Seasonal Allergies        Medications:   Current Outpatient Medications   Medication     Dietary Management Product (VASCULERA) TABS     fluticasone (FLONASE) 50 MCG/ACT nasal spray     furosemide (LASIX) 40 MG tablet     garlic 150 MG TABS tablet     glucosamine-chondroitin 500-400 MG CAPS per capsule     lisinopril (ZESTRIL) 40 MG tablet     loratadine (CLARITIN) 10 MG tablet     multivitamin w/minerals (MULTI-VITAMIN) tablet     order for DME     order for DME     order for DME     silver sulfADIAZINE (SILVADENE) 1 % external cream     No current facility-administered medications for this encounter.        Labs:   Recent Labs   Lab Test 07/22/20  1536  07/07/20  0808 07/07/20  0041 07/06/20  1639 07/06/20  1341   ALBUMIN  --   --   --   --  2.1*  --    HGB 11.4*   < > 11.3* 11.1*  --  13.9   INR  --   --   --   --   --  1.10   WBC 7.0   < > 15.5* 15.2*  --  21.9*   A1C  --   --  5.5  --   --   --    CRP  --   --   --  181.0*  --   --     < > = values in this interval not displayed.     Creatinine   Date Value Ref Range Status   07/22/2020 0.74 0.66 - 1.25 mg/dL Final     GFR Estimate   Date Value Ref Range Status   07/22/2020 >90 >60 mL/min/[1.73_m2] Final     Comment:     Non  GFR Calc  Starting 12/18/2018, serum creatinine based estimated GFR (eGFR) will be   calculated using the Chronic Kidney Disease Epidemiology Collaboration   (CKD-EPI) equation.       GFR Estimate If  Black   Date Value Ref Range Status   07/22/2020 >90 >60 mL/min/[1.73_m2] Final     Comment:      GFR Calc  Starting 12/18/2018, serum creatinine based estimated GFR (eGFR) will be   calculated using the Chronic Kidney Disease Epidemiology Collaboration   (CKD-EPI) equation.       Lab Results   Component Value Date    WBC 7.0 07/22/2020     Lab Results   Component Value Date    RBC 3.74 07/22/2020     Lab Results   Component Value Date    HGB 11.4 07/22/2020     Lab Results   Component Value Date    HCT 35.2 07/22/2020     No components found for: MCT  Lab Results   Component Value Date    MCV 94 07/22/2020     Lab Results   Component Value Date    MCH 30.5 07/22/2020     Lab Results   Component Value Date    MCHC 32.4 07/22/2020     Lab Results   Component Value Date    RDW 13.1 07/22/2020     Lab Results   Component Value Date     07/22/2020          Nutrition requirements were discussed with patient today.  Objective:  BP (!) 155/84 (BP Location: Left arm)   Pulse 88   Temp 98.3  F (36.8  C) (Temporal)   Resp 18         General:  Patient is alert and orientated, no acute distress.  Conversant.  Severe obesity.  Bilateral lower extremity lymphedema with bilateral stemmer toe's, bilateral buffalo hump, lymphorrhea left anterior tibial margin over significant area, see photo documentation.  Palpable right and left dorsalis pedis pulse with exsanguination of interstitial edema, bilateral venous stasis changes.  Lymphedema of the left is greater than the right.  Need to evaluate for potential May Thurner syndrome.  Measurements for Sigvaris CoolFlex foot components, calf components and for a  proprietary lymphedema pump to allow receptive decompression; right toe circumference 29 cm, ankle to heel 40 cm, right foot length 18 cm, right ankle circumference 32 cm, calf circumference 62 cm, calf length 48 cm, thigh 80 cm.  Left base of toe circumference 29 cm, ankle to heel 43 cm, length of  left foot 19 cm.  Left ankle circumference 37 cm, left calf 66 cm, left calf length 45 cm, left thigh 79 cm, weighs 182 cm.    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]  [] Yes [x] No   Chronic Venous Stasis ulcers [I87.2] (non-healing despite 6 months of ongoing treatment)  [x] Yes [] No phlebolymphedema    SYMPTOMS:  PATIENT EXHIBITS THE FOLLOWING SYMPTOMS DESPITE CONSERVATIVE THERAPY (check all that apply):  [] Hyperkeratosis  [x] Hyperplasia   [x] Hyperpigmentation   [x] Skin breakdown with lymphorrhea (skin weeping)   [] Papillomatosis   [x] Recurrent cellulitis   [] Fibrosis   [x] Elephantiasis  [x] Progressive edema  [x] Truncal/abdominal swelling  [] Chest/axillary swelling  [] Genital swelling  [x] Unable to control swelling  [x] Impaired ROM   [x] Impaired mobility   [x] Pain   CONSERVATIVE TREATMENT:  PATIENT HAS TRIED CONSERVATIVE TREATMENTS (COMPRESSION/EXERCISE/ELEVATION):       [x] Yes [] No   Compression garments: [] <4 weeks   [] 1-5 months   [] 6-12 months  [x] >1 year   Bandaging: [] <4 weeks   [] 1-5 months   [] 6-12 months  [x] >1 year   Elevation:   [] <4 weeks   [] 1-5 months   [] 6-12 months  [x] >1 year   Exercise:     [] <4 weeks   [] 1-5 months   [] 6-12 months  [x] >1 year   TREATMENT PLAN:  Patient to complete the following treatment(s):  Patient was seen by certified lymphedema therapist approximately 3 years ago, consultation reobtained for today for reevaluation for complete decongestive physiotherapy, manual lymphatic drainage discussion, stretching, exercise, muscle mass building    MEASUREMENTS:  Lower extremity measurements (measure the largest circumferential point of the listed area):   []  Right Leg  Inseam:_____cm Measurements  Date: _______  []  Left Leg  Inseam:_____cm Measurements  Date: _______   Thigh  _____80____cm  Thigh ____79_____cm   Knee  _________cm  Knee _________cm   Calf ____62_____cm  Calf ____66_____cm   Ankle ______32___cm  Ankle _____37____cm   Foot _________cm  Foot _________cm   Hips _____182____cm  Hips _________cm     Upper extremity measurements (measure the largest circumferential point of the listed area):  []  Right Arm  Inseam:_____cm Measurements  Date: _______  []  Left Arm  Inseam:_____cm Measurements  Date: _______    Waist  _________cm  Waist  _________cm    Chest _________cm  Chest _________cm    Biceps _________cm  Biceps _________cm    Forearm _________cm  Forearm _________cm    Wrist _________cm  Wrist _________cm      Head and neck measurements  [] Head and Neck Measurements  Date: _______   Crown of Head Circumference _________cm   Measurement Around Back of Head From Eye to Eye _________cm       COMMENTS: CLT reconsultation, was seen approximately 2 to 3 years ago, has Velcro components which do not fit properly, he has been fit for utilization of edema wear 24 hours/day, 7 days/week under Sigvaris CoolFlex foot and calf components.   proprietary lymphedema pump indicated for receptive decompression.  An  edema pump would not be likely appropriate would not result in receptive decompression and maximal management of lower extremity significant lymphedema.          Impression: Severe obesity, lymphedema primary tarda, bilateral extremity lymphedema, recurrent cellulitis, recent hospitalization related to cellulitis, lymphedema left leg greater than right leg  Barriers to healing include: obesity, lymphedema and chronic edema    Plan:  We will dress the wounds with dilute Betadine painting the left leg on a daily basis, edema wear 24 hours/day, 7 days/week, Sigvaris CoolFlex foot and calf component, certified lymphedema therapist evaluation,  proprietary lymphedema pump.  Obtain duplex ultrasound to evaluate iliac veins and bilateral lower extremity venous  insufficiency, significance of left lower extremity lymphedema, need to evaluate for potential May Thurner syndrome .  Recommended exercise ambulatory component to obtain eventually 10,000 A Daily Basis.  Recommended Consultation with Bariatric Surgeon to Discuss Indications for Gastric Sleeve Procedure.  Vasculera Micronized Purified Flavonoid Fraction 1 Tablet Daily.  Patient will return to the clinic in 8 weeks time.     Venkata Valderrama MD on 7/29/2020 at 3:50 PM

## 2020-08-07 ENCOUNTER — MYC MEDICAL ADVICE (OUTPATIENT)
Dept: SURGERY | Facility: PHYSICIAN GROUP | Age: 60
End: 2020-08-07

## 2020-08-12 ENCOUNTER — ANCILLARY PROCEDURE (OUTPATIENT)
Dept: ULTRASOUND IMAGING | Facility: CLINIC | Age: 60
End: 2020-08-12
Attending: SURGERY
Payer: COMMERCIAL

## 2020-08-12 DIAGNOSIS — I89.0 LYMPHEDEMA OF BOTH LOWER EXTREMITIES: ICD-10-CM

## 2020-08-12 PROCEDURE — 93970 EXTREMITY STUDY: CPT | Performed by: SURGERY

## 2020-09-01 ENCOUNTER — HOSPITAL ENCOUNTER (OUTPATIENT)
Dept: PHYSICAL THERAPY | Facility: CLINIC | Age: 60
End: 2020-09-01
Attending: SURGERY
Payer: COMMERCIAL

## 2020-09-01 DIAGNOSIS — R53.81 PHYSICAL DECONDITIONING: ICD-10-CM

## 2020-09-01 DIAGNOSIS — I89.0 LYMPHEDEMA OF BOTH LOWER EXTREMITIES: ICD-10-CM

## 2020-09-01 DIAGNOSIS — L90.5 SCAR CONDITION AND FIBROSIS OF SKIN: ICD-10-CM

## 2020-09-01 DIAGNOSIS — I89.0 LYMPHEDEMA: Primary | ICD-10-CM

## 2020-09-01 DIAGNOSIS — R60.0 PERIPHERAL EDEMA: ICD-10-CM

## 2020-09-01 DIAGNOSIS — E66.01 MORBID OBESITY WITH BMI OF 50.0-59.9, ADULT (H): ICD-10-CM

## 2020-09-01 DIAGNOSIS — S81.802A OPEN WOUND OF LEFT LOWER EXTREMITY, INITIAL ENCOUNTER: ICD-10-CM

## 2020-09-01 DIAGNOSIS — I87.2 VENOUS STASIS DERMATITIS OF BOTH LOWER EXTREMITIES: ICD-10-CM

## 2020-09-01 PROCEDURE — 97140 MANUAL THERAPY 1/> REGIONS: CPT | Mod: GP | Performed by: PHYSICAL THERAPIST

## 2020-09-01 PROCEDURE — 97110 THERAPEUTIC EXERCISES: CPT | Mod: GP | Performed by: PHYSICAL THERAPIST

## 2020-09-01 PROCEDURE — 97162 PT EVAL MOD COMPLEX 30 MIN: CPT | Mod: GP | Performed by: PHYSICAL THERAPIST

## 2020-09-01 PROCEDURE — 97535 SELF CARE MNGMENT TRAINING: CPT | Mod: GP | Performed by: PHYSICAL THERAPIST

## 2020-09-01 PROCEDURE — 97597 DBRDMT OPN WND 1ST 20 CM/<: CPT | Mod: GP | Performed by: PHYSICAL THERAPIST

## 2020-09-01 NOTE — PROGRESS NOTES
09/01/20 0900   Rehab Discipline   Discipline PT   Type of Visit   Type of visit Initial Edema Evaluation   General Information   Start of care 09/01/20   Referring physician Venkata Valderrama MD   Orders Evaluate and treat as indicated   Order date 07/29/20   Medical diagnosis Lymphedema, phlebolympedema, venous stasis dermatitis, peripheral edema, morbid obesity   Onset of illness / date of surgery 07/29/20  (date of order)   Affected body parts LLE;RLE   Edema etiology Infection;Chronic Venous Insufficiency;Ulcers   Edema etiology comments patient with morbid obesity with BMI of 54 and long standing phlebolymphedema resulting in h/o multiple cellulitis and wounds, also with foot pain limiting walking/activity.  Did receive CLT in 7 2018.   Pertinent history of current problem (PT: include personal factors and/or comorbidities that impact the POC; OT: include additional occupational profile info) Patient with long h/o of phlebolymphedema and multiple episodes of cellulitis and wounds, morbid obesity with BMI of 54, h/o MARLY, HTN, , plantar fasciitis with pain limiting funcitonal mobility and gait, recent h/o acute kidney injury, s/p CLT July 2018; feels activity level significanty decreased once COVID started; has been sleeping in chair but does not recline x 4 year as no longer tolerates sleeping in bed due to back pain and MARLY using Cpap but not using while sleeping upright. Recliner is ordered but awaiting arrival.    Surgical / medical history reviewed Yes   Prior treatment Complete decongestive therapy;Compression garments;Exercise;Compression pump;Elevation;MLD;Gradient compression bandaging  (EdemaWear stockinette; Coolflex lower leg compression device)   Community support Family / friend caregiver  (wife Janna)   Patient role / employment history Employed   Psychosocial concerns Impaired sleep   Living environment House / townhome  (steps to enter no rail but looking for rail )   Living environment  "comments lives with wife, awaiting new recliner, working from home since Covid, using gel seat cushion   Current assistive devices Standard cane   Assistive device comments intermittent use of cane maily for leg lifting    General observations s/p severe cellulitis L>R 7/7/2020 with open wounds and hospitalization x 9 days; completed antibiotics 7/12/2020.   s/p venous US awaiting results; since discharge using 2 sizes Edema Wear (blue stripe for feet/ankle and yellow stripe lower leg to below knee)  with Sigvaris CoolFlex size XL/XXL added during day and some nights. ; using Betadine on legs followed by [facility]; received Flexitouch pump with DVD but has not yet used. Stating new velcro units not covered by current insurance but prior custom knee highs were covered.   Fall Risk Screen   Fall screen completed by PT   Have you fallen 2 or more times in the past year? No   Have you fallen and had an injury in the past year? No   Is patient a fall risk? Yes;Department fall risk interventions implemented   Fall screen comments did slip on ice   Abuse Screen (yes response referral indicated)   Feels Unsafe at Home or Work/School no   Feels Threatened by Someone no   Does Anyone Try to Keep You From Having Contact with Others or Doing Things Outside Your Home? no   Physical Signs of Abuse Present no   System Outcome Measures   Outcome Measures Lymphedema   Lymphedema Life Impact Scale (score range 0-72). A higher score indicates greater impairment. 27   Subjective Report   Patient report of symptoms worsening edema, wounds after recent cellultisi   Patient / Family Goals   Patient / family goals statement decrease edema, heal wounds, improve home program mangement   Pain   Patient currently in pain Yes   Pain location knees with stairs   Pain comments pain from infection now resolved   Vitals Signs   Weight 427lbs; 6'4\"   BMI 54   Cognitive Status   Orientation Orientation to person, place and time   Level of " consciousness Alert   Follows commands and answers questions 100% of the time   Personal safety and judgement Intact   Edema Exam / Assessment   Skin condition Pitting;Venous distention;Dryness;Hemosiderin deposits   Skin condition comments superficial serrous draining wound L lateral proximal and proximal anterior lower leg   Pitting 2+;3+   Pitting location L>R pretigial   Capillary refill Symmetrical   Stemmer sign Positive   Ulceration comments L LE superficial   Girth Measurements   Girth Measurements Refer to separate girth measurement flowsheet   Volume LE   Right LE (mL) to [id number]   Left LE (mL) to [id number]   LE volume comparison LLE volume greater than RLE volume   % difference 26   Range of Motion   ROM Other   ROM comments limited by tissue approximaltion   Strength   Strength comments STS= 8 without UE support; <8 demonstrates decreased funcitonal LE strength and elevated risk of falls   Posture   Posture Forward head position;Protracted shoulders   Gait / Locomotion   Gait / Locomotion decreased pace, wide SOBEIDA   Sensory   Sensory perception comments feet feels different   Planned Edema Interventions   Planned edema interventions Manual lymph drainage;Gradient compression bandaging;Fit for compression garment;Exercises;Precautions to prevent infection / exacerbation;Education;Manual therapy;ADL training;Skin care / precautions;Scar mobilization;Soft tissue mobilization;Myofascial release;Home management program development   Clinical Impression   Criteria for skilled therapeutic intervention met Yes   Therapy diagnosis lymphedema, phlebolymphedema, fibrosis, impaired gait and functional mobility and endurance   Influenced by the following impairments / conditions Edema;Stage 2;Phlebolymphedema   Clinical Presentation Evolving/Changing   Clinical Presentation Rationale patient reporting worsening edema   Clinical Decision Making (Complexity) Moderate complexity   Treatment Frequency 4x/week  "  Treatment duration x 2 weeks then decrease to 2-3x/week x 2 weeks then 1x/month x 2 months   Patient / family and/or staff in agreement with plan of care Yes   Risks and benefits of therapy have been explained Yes   Clinical impression comments patient with exacerbation of phlebolymphedema due to recent cellulitis infection, and wounds; pain resulting in decreased mobility and edema and increased risk of infection and would benefit from therapy to achieve stated goals   Education Assessment   Preferred learning style Listening;Reading;Demonstration   Barriers to learning Physical   Goal 1   Goal identifier HOme program   Goal description Patient to demonstrate independece in HEP for LE lymphedema and deconditioning   Target date 11/30/20   Goal 2   Goal identifier volume LEs   Goal description Decreased B LE volume by 10% to progress wound healin and decrease risk of infection   Target date 10/31/20   Goal 3   Goal identifier LLIS   Goal description Decrase LLIS by 8 points to demonstrate decreased impact of edema on function   Target date 11/30/20   Goal 4   Goal identifier STS in 30\"   Goal description Patient to be able to perform 13 STS in 30\" without UE support to demonstrate improved funcitonal LE strength and decrease risk of falls   Target date 10/31/20   Total Evaluation Time   PT Hiram, Moderate Complexity Minutes (39945) 30     "

## 2020-09-23 ENCOUNTER — HOSPITAL ENCOUNTER (OUTPATIENT)
Dept: WOUND CARE | Facility: CLINIC | Age: 60
Discharge: HOME | End: 2020-09-23
Attending: SURGERY | Admitting: SURGERY
Payer: COMMERCIAL

## 2020-09-23 VITALS
SYSTOLIC BLOOD PRESSURE: 162 MMHG | HEART RATE: 83 BPM | DIASTOLIC BLOOD PRESSURE: 68 MMHG | RESPIRATION RATE: 18 BRPM | TEMPERATURE: 97.7 F

## 2020-09-23 DIAGNOSIS — I87.2 VENOUS STASIS DERMATITIS OF BOTH LOWER EXTREMITIES: Primary | ICD-10-CM

## 2020-09-23 DIAGNOSIS — R60.0 PERIPHERAL EDEMA: ICD-10-CM

## 2020-09-23 DIAGNOSIS — E66.01 MORBID OBESITY WITH BMI OF 50.0-59.9, ADULT (H): ICD-10-CM

## 2020-09-23 DIAGNOSIS — I89.0 LYMPHEDEMA OF BOTH LOWER EXTREMITIES: ICD-10-CM

## 2020-09-23 DIAGNOSIS — I89.0 LYMPHEDEMA: ICD-10-CM

## 2020-09-23 PROCEDURE — G0463 HOSPITAL OUTPT CLINIC VISIT: HCPCS

## 2020-09-23 PROCEDURE — 99213 OFFICE O/P EST LOW 20 MIN: CPT | Performed by: SURGERY

## 2020-09-23 RX ORDER — INFLUENZA A VIRUS A/VICTORIA/2454/2019 IVR-207 (H1N1) ANTIGEN (PROPIOLACTONE INACTIVATED), INFLUENZA A VIRUS A/HONG KONG/2671/2019 IVR-208 (H3N2) ANTIGEN (PROPIOLACTONE INACTIVATED), INFLUENZA B VIRUS B/VICTORIA/705/2018 BVR-11 ANTIGEN (PROPIOLACTONE INACTIVATED), INFLUENZA B VIRUS B/PHUKET/3073/2013 BVR-1B ANTIGEN (PROPIOLACTONE INACTIVATED) 15; 15; 15; 15 UG/.5ML; UG/.5ML; UG/.5ML; UG/.5ML
INJECTION, SUSPENSION INTRAMUSCULAR
COMMUNITY
Start: 2020-08-22

## 2020-09-23 RX ORDER — BETAMETHASONE DIPROPIONATE 0.05 %
OINTMENT (GRAM) TOPICAL DAILY
Qty: 100 G | Refills: 3 | Status: SHIPPED | OUTPATIENT
Start: 2020-09-23

## 2020-09-23 NOTE — PROGRESS NOTES
[facility] [place] Progress Note    Subject: Da Mustafa severe obesity, chronic primary lymphedema tarda, improved cellulitis left lower extremity.  Discussed importance of increasing number of steps per day.  He has made significant changes in his diet.  Denies fevers chills or sweats.  Has begun utilization of edema wear and cool flex.  Lymphedema pump has arrived, encouraged him to utilize for an hour on a daily basis.  Duplex ultrasound of the lower extremities for venous insufficiency reviewed, multilevel deep venous insufficiency of the right lower extremity, proximal deep venous insufficiency of the left lower extremity, duplex ultrasound to evaluate for May Thurner syndrome was not performed.  Will therefore proceed with CT with IV contrast to evaluate for potential May Thurner syndrome given significance of left lower extremity edema as compared to right lower extremity, creatinine normal.  No dye allergies.    Patient Active Problem List   Diagnosis     Edema     Family history of diabetes mellitus     Seasonal allergic rhinitis     Hyperlipidemia LDL goal <160     Essential hypertension with goal blood pressure less than 140/90     Arthritis of left foot     Class 3 obesity due to excess calories without serious comorbidity with body mass index (BMI) of 50.0 to 59.9 in adult     Venous stasis dermatitis of both lower extremities     Peripheral edema     Lymphedema     Morbid obesity with BMI of 50.0-59.9, adult (H)     Severe sepsis (H)     Lymphedema of both lower extremities     Past Medical History:   Diagnosis Date     Class 3 obesity due to excess calories without serious comorbidity with body mass index (BMI) of 50.0 to 59.9 in adult 12/27/2017     Edema      Essential hypertension with goal blood pressure less than 140/90 7/25/2016     Essential hypertension, benign      Hyperlipidemia LDL goal <160 7/28/2015     Hypertension goal BP (blood pressure) < 140/90 4/25/2013      Obesity, unspecified      Seasonal allergic rhinitis 5/19/2014     Sleep apnea      Exam:  BP (!) 162/68 (BP Location: Left arm)   Pulse 83   Temp 97.7  F (36.5  C) (Temporal)   Resp 18      60-year-old male, severe obesity, decreased erythema left lower extremity, improved interstitial edema control.  Persistent significant dermatitis.      Impression: Severe obesity, primary lymphedema tarda, Phlebolymphedema    Plan: We will dress the wounds with hypochlorous acid soaks.  Begin betamethasone application under Saran wrap for an hour to the left lower extremity for approximately 8 weeks.  Continue edema wear and inelastic cool Flex wraps.  Lymphedema pump on a daily basis for minimum 1 hour.  Encouraged ambulatory exercise program, goal is ultimately 10,000 steps on a daily basis.  Encouraged to continue best nutritional intake..  Patient will return to the clinic in 8 weeks time    Venkata Valderrama MD on 9/23/2020 at 2:57 PM

## 2020-09-23 NOTE — DISCHARGE INSTRUCTIONS
[facility]  [address] [address]    Call us at [phone] if you have any questions about your wounds, have redness or swelling around your wound, have a fever of 101 or greater or if you have any other problems or concerns. We answer the phone Monday through Friday 8 am to 4 pm, please leave a message as we check the voicemail frequently throughout the day.     Da Mustafa      1960  1.Time restriction for eating (12-8 pm)  2.No Soda - Not even Diet Soda  3.Walk 10,000 steps a day  4.Do upper body weight exercises    Use lymphedema pump one hour twice a day than one hour the rest of your life.  Cleanse Lower Legs with mild soap (Cetaphil) and water. Pat Dry.   Immediately following apply thin layer of Betamethasone Ointment to left leg, wrap with saran wrap for 1 hour than apply Vanicream moisturizing cream.  Apply Edemawear blue strip from base of toe to ankle, than yellow stripe from ankle to knee.  Compression:  You have a compression coolflex wrap pulled to the second line on the strap is supposed to be removed at night and put back on first thing in the morning.  Please remove compression dressing if toes turn blue and/or tingle and can not be relieved by raising the leg for one hour.     BALJIT Valderrama M.D.. September 23, 2020

## 2020-10-06 ENCOUNTER — HOSPITAL ENCOUNTER (OUTPATIENT)
Dept: PHYSICAL THERAPY | Facility: CLINIC | Age: 60
End: 2020-10-06
Payer: COMMERCIAL

## 2020-10-06 DIAGNOSIS — L90.5 SCAR CONDITION AND FIBROSIS OF SKIN: ICD-10-CM

## 2020-10-06 DIAGNOSIS — I89.0 LYMPHEDEMA: Primary | ICD-10-CM

## 2020-10-06 DIAGNOSIS — R53.81 PHYSICAL DECONDITIONING: ICD-10-CM

## 2020-10-06 DIAGNOSIS — R53.1 DECREASED STRENGTH: ICD-10-CM

## 2020-10-06 DIAGNOSIS — E66.01 MORBID OBESITY WITH BMI OF 50.0-59.9, ADULT (H): ICD-10-CM

## 2020-10-06 DIAGNOSIS — I87.2 VENOUS STASIS DERMATITIS OF BOTH LOWER EXTREMITIES: ICD-10-CM

## 2020-10-06 PROCEDURE — 97110 THERAPEUTIC EXERCISES: CPT | Mod: GP | Performed by: PHYSICAL THERAPIST

## 2020-10-06 PROCEDURE — 97140 MANUAL THERAPY 1/> REGIONS: CPT | Mod: GP | Performed by: PHYSICAL THERAPIST

## 2020-10-08 ENCOUNTER — HOSPITAL ENCOUNTER (OUTPATIENT)
Dept: PHYSICAL THERAPY | Facility: CLINIC | Age: 60
End: 2020-10-08
Payer: COMMERCIAL

## 2020-10-08 DIAGNOSIS — S81.802A OPEN WOUND OF LEFT LOWER EXTREMITY, INITIAL ENCOUNTER: ICD-10-CM

## 2020-10-08 DIAGNOSIS — I87.2 VENOUS STASIS DERMATITIS OF BOTH LOWER EXTREMITIES: ICD-10-CM

## 2020-10-08 DIAGNOSIS — E66.01 MORBID OBESITY WITH BMI OF 50.0-59.9, ADULT (H): ICD-10-CM

## 2020-10-08 DIAGNOSIS — R53.81 PHYSICAL DECONDITIONING: ICD-10-CM

## 2020-10-08 DIAGNOSIS — I89.0 LYMPHEDEMA OF BOTH LOWER EXTREMITIES: ICD-10-CM

## 2020-10-08 DIAGNOSIS — R60.0 PERIPHERAL EDEMA: ICD-10-CM

## 2020-10-08 DIAGNOSIS — I89.0 LYMPHEDEMA: Primary | ICD-10-CM

## 2020-10-08 DIAGNOSIS — R53.1 DECREASED STRENGTH: ICD-10-CM

## 2020-10-08 DIAGNOSIS — L90.5 SCAR CONDITION AND FIBROSIS OF SKIN: ICD-10-CM

## 2020-10-08 PROCEDURE — 97140 MANUAL THERAPY 1/> REGIONS: CPT | Mod: 59 | Performed by: PHYSICAL THERAPIST

## 2020-10-09 ENCOUNTER — HOSPITAL ENCOUNTER (OUTPATIENT)
Dept: PHYSICAL THERAPY | Facility: CLINIC | Age: 60
End: 2020-10-09
Payer: COMMERCIAL

## 2020-10-09 DIAGNOSIS — I89.0 LYMPHEDEMA: Primary | ICD-10-CM

## 2020-10-09 PROCEDURE — 97140 MANUAL THERAPY 1/> REGIONS: CPT | Mod: 59 | Performed by: PHYSICAL THERAPIST

## 2020-10-12 ENCOUNTER — HOSPITAL ENCOUNTER (OUTPATIENT)
Dept: PHYSICAL THERAPY | Facility: CLINIC | Age: 60
End: 2020-10-12
Payer: COMMERCIAL

## 2020-10-12 DIAGNOSIS — E66.01 MORBID OBESITY WITH BMI OF 50.0-59.9, ADULT (H): ICD-10-CM

## 2020-10-12 DIAGNOSIS — R53.81 PHYSICAL DECONDITIONING: ICD-10-CM

## 2020-10-12 DIAGNOSIS — L90.5 SCAR CONDITION AND FIBROSIS OF SKIN: ICD-10-CM

## 2020-10-12 DIAGNOSIS — I89.0 LYMPHEDEMA OF BOTH LOWER EXTREMITIES: ICD-10-CM

## 2020-10-12 DIAGNOSIS — I89.0 LYMPHEDEMA: Primary | ICD-10-CM

## 2020-10-12 DIAGNOSIS — R53.1 DECREASED STRENGTH: ICD-10-CM

## 2020-10-12 DIAGNOSIS — I87.2 VENOUS STASIS DERMATITIS OF BOTH LOWER EXTREMITIES: ICD-10-CM

## 2020-10-12 PROCEDURE — 97110 THERAPEUTIC EXERCISES: CPT | Performed by: PHYSICAL THERAPIST

## 2020-10-12 PROCEDURE — 97140 MANUAL THERAPY 1/> REGIONS: CPT | Mod: 59 | Performed by: PHYSICAL THERAPIST

## 2020-10-13 ENCOUNTER — HOSPITAL ENCOUNTER (OUTPATIENT)
Dept: PHYSICAL THERAPY | Facility: CLINIC | Age: 60
End: 2020-10-13
Payer: COMMERCIAL

## 2020-10-13 DIAGNOSIS — R53.1 DECREASED STRENGTH: ICD-10-CM

## 2020-10-13 DIAGNOSIS — L90.5 SCAR CONDITION AND FIBROSIS OF SKIN: ICD-10-CM

## 2020-10-13 DIAGNOSIS — E66.01 MORBID OBESITY WITH BMI OF 50.0-59.9, ADULT (H): ICD-10-CM

## 2020-10-13 DIAGNOSIS — I89.0 LYMPHEDEMA: Primary | ICD-10-CM

## 2020-10-13 DIAGNOSIS — R53.81 PHYSICAL DECONDITIONING: ICD-10-CM

## 2020-10-13 DIAGNOSIS — I89.0 LYMPHEDEMA OF BOTH LOWER EXTREMITIES: ICD-10-CM

## 2020-10-13 DIAGNOSIS — I87.2 VENOUS STASIS DERMATITIS OF BOTH LOWER EXTREMITIES: ICD-10-CM

## 2020-10-13 PROCEDURE — 97535 SELF CARE MNGMENT TRAINING: CPT | Performed by: PHYSICAL THERAPIST

## 2020-10-13 PROCEDURE — 97140 MANUAL THERAPY 1/> REGIONS: CPT | Mod: 59 | Performed by: PHYSICAL THERAPIST

## 2020-10-14 ENCOUNTER — HOSPITAL ENCOUNTER (OUTPATIENT)
Dept: PHYSICAL THERAPY | Facility: CLINIC | Age: 60
End: 2020-10-14
Payer: COMMERCIAL

## 2020-10-14 DIAGNOSIS — I89.0 LYMPHEDEMA: Primary | ICD-10-CM

## 2020-10-14 DIAGNOSIS — I87.2 VENOUS STASIS DERMATITIS OF BOTH LOWER EXTREMITIES: ICD-10-CM

## 2020-10-14 DIAGNOSIS — L90.5 SCAR CONDITION AND FIBROSIS OF SKIN: ICD-10-CM

## 2020-10-14 DIAGNOSIS — E66.01 MORBID OBESITY WITH BMI OF 50.0-59.9, ADULT (H): ICD-10-CM

## 2020-10-14 DIAGNOSIS — R53.81 PHYSICAL DECONDITIONING: ICD-10-CM

## 2020-10-14 DIAGNOSIS — R53.1 DECREASED STRENGTH: ICD-10-CM

## 2020-10-14 PROCEDURE — 97140 MANUAL THERAPY 1/> REGIONS: CPT | Mod: 59 | Performed by: PHYSICAL THERAPIST

## 2020-10-14 PROCEDURE — 97535 SELF CARE MNGMENT TRAINING: CPT | Performed by: PHYSICAL THERAPIST

## 2020-10-15 ENCOUNTER — HOSPITAL ENCOUNTER (OUTPATIENT)
Dept: PHYSICAL THERAPY | Facility: CLINIC | Age: 60
End: 2020-10-15
Payer: COMMERCIAL

## 2020-10-15 DIAGNOSIS — I89.0 LYMPHEDEMA: Primary | ICD-10-CM

## 2020-10-15 DIAGNOSIS — R60.0 PERIPHERAL EDEMA: ICD-10-CM

## 2020-10-15 DIAGNOSIS — L90.5 SCAR CONDITION AND FIBROSIS OF SKIN: ICD-10-CM

## 2020-10-15 DIAGNOSIS — I87.2 VENOUS STASIS DERMATITIS OF BOTH LOWER EXTREMITIES: ICD-10-CM

## 2020-10-15 DIAGNOSIS — R53.81 PHYSICAL DECONDITIONING: ICD-10-CM

## 2020-10-15 DIAGNOSIS — R53.1 DECREASED STRENGTH: ICD-10-CM

## 2020-10-15 DIAGNOSIS — I10 ESSENTIAL HYPERTENSION WITH GOAL BLOOD PRESSURE LESS THAN 140/90: ICD-10-CM

## 2020-10-15 DIAGNOSIS — E66.01 MORBID OBESITY WITH BMI OF 50.0-59.9, ADULT (H): ICD-10-CM

## 2020-10-15 PROCEDURE — 97535 SELF CARE MNGMENT TRAINING: CPT | Performed by: PHYSICAL THERAPIST

## 2020-10-15 PROCEDURE — 97140 MANUAL THERAPY 1/> REGIONS: CPT | Mod: 59 | Performed by: PHYSICAL THERAPIST

## 2020-10-15 RX ORDER — FUROSEMIDE 40 MG
TABLET ORAL
Qty: 90 TABLET | Refills: 0 | Status: SHIPPED | OUTPATIENT
Start: 2020-10-15 | End: 2021-01-11

## 2020-10-15 NOTE — TELEPHONE ENCOUNTER
Routing refill request to provider for review/approval because:  Due to BP   BP Readings from Last 3 Encounters:   09/23/20 (!) 162/68   07/29/20 (!) 155/84   07/22/20 110/76       Shahla Fink RN

## 2020-10-21 ENCOUNTER — HOSPITAL ENCOUNTER (OUTPATIENT)
Dept: PHYSICAL THERAPY | Facility: CLINIC | Age: 60
End: 2020-10-21
Payer: COMMERCIAL

## 2020-10-21 DIAGNOSIS — L90.5 SCAR CONDITION AND FIBROSIS OF SKIN: ICD-10-CM

## 2020-10-21 DIAGNOSIS — I89.0 LYMPHEDEMA: Primary | ICD-10-CM

## 2020-10-21 DIAGNOSIS — R53.1 DECREASED STRENGTH: ICD-10-CM

## 2020-10-21 DIAGNOSIS — I87.2 VENOUS STASIS DERMATITIS OF BOTH LOWER EXTREMITIES: ICD-10-CM

## 2020-10-21 DIAGNOSIS — R53.81 PHYSICAL DECONDITIONING: ICD-10-CM

## 2020-10-21 PROCEDURE — 97535 SELF CARE MNGMENT TRAINING: CPT | Performed by: PHYSICAL THERAPIST

## 2020-10-21 PROCEDURE — 97140 MANUAL THERAPY 1/> REGIONS: CPT | Mod: 59 | Performed by: PHYSICAL THERAPIST

## 2020-10-22 ENCOUNTER — HOSPITAL ENCOUNTER (OUTPATIENT)
Dept: PHYSICAL THERAPY | Facility: CLINIC | Age: 60
End: 2020-10-22
Payer: COMMERCIAL

## 2020-10-22 DIAGNOSIS — I89.0 LYMPHEDEMA OF BOTH LOWER EXTREMITIES: ICD-10-CM

## 2020-10-22 DIAGNOSIS — R53.1 DECREASED STRENGTH: ICD-10-CM

## 2020-10-22 DIAGNOSIS — L90.5 SCAR CONDITION AND FIBROSIS OF SKIN: ICD-10-CM

## 2020-10-22 DIAGNOSIS — R53.81 PHYSICAL DECONDITIONING: ICD-10-CM

## 2020-10-22 DIAGNOSIS — I87.2 VENOUS STASIS DERMATITIS OF BOTH LOWER EXTREMITIES: ICD-10-CM

## 2020-10-22 DIAGNOSIS — I89.0 LYMPHEDEMA: Primary | ICD-10-CM

## 2020-10-22 PROCEDURE — 97140 MANUAL THERAPY 1/> REGIONS: CPT | Mod: 59 | Performed by: PHYSICAL THERAPIST

## 2020-10-22 PROCEDURE — 97110 THERAPEUTIC EXERCISES: CPT | Performed by: PHYSICAL THERAPIST

## 2020-10-23 ENCOUNTER — HOSPITAL ENCOUNTER (OUTPATIENT)
Dept: PHYSICAL THERAPY | Facility: CLINIC | Age: 60
End: 2020-10-23
Payer: COMMERCIAL

## 2020-10-23 DIAGNOSIS — I89.0 LYMPHEDEMA OF BOTH LOWER EXTREMITIES: ICD-10-CM

## 2020-10-23 DIAGNOSIS — I89.0 LYMPHEDEMA: Primary | ICD-10-CM

## 2020-10-23 DIAGNOSIS — R53.81 PHYSICAL DECONDITIONING: ICD-10-CM

## 2020-10-23 DIAGNOSIS — L90.5 SCAR CONDITION AND FIBROSIS OF SKIN: ICD-10-CM

## 2020-10-23 DIAGNOSIS — I87.2 VENOUS STASIS DERMATITIS OF BOTH LOWER EXTREMITIES: ICD-10-CM

## 2020-10-23 DIAGNOSIS — R53.1 DECREASED STRENGTH: ICD-10-CM

## 2020-10-23 PROCEDURE — 97140 MANUAL THERAPY 1/> REGIONS: CPT | Mod: 59 | Performed by: PHYSICAL THERAPIST

## 2020-10-23 PROCEDURE — 97110 THERAPEUTIC EXERCISES: CPT | Performed by: PHYSICAL THERAPIST

## 2020-10-23 NOTE — IP AVS SNAPSHOT
"    [facility] WANDER: [phone]                                              INTERAGENCY TRANSFER FORM - LAB / IMAGING / EKG / EMG RESULTS   10/23/2020                   Hospital Admission Date: 10/23/2020  RANJIT COSME   : 1960  Sex: Male         Attending Provider: (none)   Allergies: No Known Drug Allergies, Seasonal Allergies    Infection: None   Service: None    Ht: 1.943 m (6' 4.5\")   Wt: --   Admission Wt: --    BMI: --   BSA: --            Patient PCP Information     Provider PCP Type    Robel Morris PA-C General      Unresulted Labs (24h ago, onward)    None      Encounter-Level Documents:    There are no encounter-level documents.     Order-Level Documents:    There are no order-level documents.     "

## 2020-10-23 NOTE — IP AVS SNAPSHOT
MRN:[id number]                      After Visit Summary   10/23/2020    Da Mustafa    MRN: [id number]           Visit Information        Provider Department      10/23/2020  1:15 PM Camille Hood, BALJIT [facility]           Review of your medicines      UNREVIEWED medicines. Ask your doctor about these medicines       Dose / Directions   Afluria Quadrivalent 0.5 ML injection  Generic drug: influenza quadrivalent (PF) vacc      TO BE ADMINISTERED BY PHARMACIST FOR IMMUNIZATION  Refills: 0     betamethasone dipropionate 0.05 % external ointment  Commonly known as: DIPROSONE  Used for: Venous stasis dermatitis of both lower extremities      Apply topically daily  Quantity: 100 g  Refills: 3     fluticasone 50 MCG/ACT nasal spray  Commonly known as: FLONASE      Dose: 1 spray  Spray 1 spray into both nostrils daily as needed for rhinitis or allergies  Refills: 0     furosemide 40 MG tablet  Commonly known as: LASIX  Used for: Peripheral edema, Essential hypertension with goal blood pressure less than 140/90      TAKE 1 TABLET BY MOUTH EVERY DAY  Quantity: 90 tablet  Refills: 0     lisinopril 40 MG tablet  Commonly known as: ZESTRIL  Used for: Essential hypertension with goal blood pressure less than 140/90      TAKE 1 TABLET BY MOUTH EVERY DAY  Quantity: 90 tablet  Refills: 0     loratadine 10 MG tablet  Commonly known as: CLARITIN      Dose: 10 mg  Take 10 mg by mouth daily  Refills: 0     Multi-vitamin tablet      Dose: 1 tablet  Take 1 tablet by mouth daily  Refills: 0     silver sulfADIAZINE 1 % external cream  Commonly known as: SILVADENE  Used for: Peripheral edema      APPLY TO AFFECTED AREA TWICE A DAY  Quantity: 85 g  Refills: 1     Vasculera Tabs  Used for: Lymphedema of both lower extremities      Dose: 1 tablet  Take 1 tablet by mouth daily  Quantity: 90 tablet  Refills: 3        CONTINUE these medicines which have NOT CHANGED       Dose / Directions  "  garlic 150 MG Tabs tablet      Dose: 150 mg  Take 150 mg by mouth daily  Refills: 0     glucosamine-chondroitin 500-400 MG Caps per capsule      Dose: 1 capsule  Take 1 capsule by mouth daily  Refills: 0     order for DME  Used for: Lymphedema of both lower extremities      [facility] Rashmi Vicente  Phone [phone] Fax [phone]  Coolflex Wrap foot and leg measurements in CM  J:29/29  I:40/43  K:18/19  A: 32/37  C:62/66  Lateral Malleolus to Fibular Head: 48/45  Quantity: 2 Device  Refills: 0     order for DME  Used for: Lymphedema of both lower extremities      Flexiplus Pump  Please call patient to evaluate and treat  Cuong Clarke Fax [phone]  Include Facesheet and progress notes with limb measurements  Quantity: 1 Device  Refills: 0     order for DME  Used for: Lymphedema of both lower extremities      [facility] Phone [phone] Fax [phone]  Edemawear Size blue strip Qty 4 sleeves  edemawear size yellow strip 4 sleeves  Quantity: 6 Device  Refills: 0              Protect others around you: Learn how to safely use, store and throw away your medicines at www.disposemymeds.org.       Follow-ups after your visit       Your next 10 appointments already scheduled    Oct 28, 2020  1:30 PM  (Arrive by 1:10 PM)  Office Visit with GARO Sr [facility] ([facility]) [address] [place] [address] [phone]   Please plan to arrive at the clinic at your \"Arrive By\" time for your appointment. Our late policy will be enforced based on this time.     Nov 18, 2020 11:15 AM  Lymphedema Treatment with BALJIT Ruano [facility] [place] ([facility]) [address] [address] [phone]      Nov 18, 2020  2:20 PM  Return Visit with MD ALICIA Loera [facility] [place] ([facility]) [address]  [address]  [facility] [address] [phone]         Care Instructions       Further instructions from your care team       LYMPHEDEMA/ GENERAL CONDITIONING HOME PROGRAM AND MAINTENANCE PHASE GUIDELINES    1. Self Manual Lymph Drainage (MLD) massage: Continue to perform your self- lymphatic massage of trunk (neck/ armpit lymphnodes and side trunk sweeps) nightly (1-2x/day)  when using pump as well as when performing your Edema exercises.    2. Leg Edema Exercises: Continue your lymphedema exercise sequence for your leg edema 1x/day; ;ie incorporate into moving throughout day.    3. Strengthening Exercises: standing heel/toe raises and sit to stand with limited or no support of arms as able or option to perform standing mini squats. Prior PT exs (Gastroc/Soleus stretch, standing hip extension and hip extension with theraband at 45 degrees, Single leg standing balance)    4. Wear your compression garment daily (primarily EdemaWear stockinette with Cool Flex garments but can trial newest custom compression stockings intermittently and assess effectiveness of both using pitting response along shin bone.  Option to wear Cool Flex at night then would need to add Cool Flex foot pieces or option to wear Cool Flex foot compression with Edema wear at night    5. Bandage your legs 1x/week can leave on up to 1-2 days if looking and feeling good. Option to place round swell spot fibrotic softening pad over areas of fibrosis (L anterior lateral leg in area of discoloration of skin) pad to only be used 24 hours to limit potential skin irritation. Option to use of 4th bandage ankle to below knee on L leg to provide increase compression and decreased edema.    6. Continue to follow good skin care practices and precautions.     7. Compression garments on average have a 6 month life expectancy depending on type of garment (ready made vs custom) and their use and wear. Replace your day and night compression garments based on signs  of wear (such as excessive pilling, running, snags, or holes), loss of elasticity and compression, or improper fit (too tight or too loose).       A. If your garment is still fitting well: call *** to reorder the same type and size garment.      B. If your garment is not fitting properly (because of change in arm/leg size): call your doctor to get a referral to see your lymphedema therapist for a recheck.      C. You may need to be re-measured for a new compression garment if you have had a significant change in your weight (greater than 15-20 pounds).       D. A new onset of other medical conditions (such as CHF, infection/cellulitis) may indicate the need for new compression garment.    8. Replace your bandages every 12 months or sooner if you notice loss of elasticity or signs of excessive wear and tear.     9. Contact your physician with any worsening of edema not controlled with current home program, signs/symptoms of infection (redness, pain, warmth, fever, increased swelling).    10. You should contact your edema therapist with any questions/concerns regarding your edema/home program. You will need to call your doctor first to get a new referral if treatment is indicated.      Additional Information About Your Visit       [facility] [email or web address] Information    [facility] gives you secure access to your electronic health record. If you see a primary care provider, you can also send messages to your care team and make appointments. If you have questions, please call your primary care clinic.  If you do not have a primary care provider, please call [phone] and they will assist you.       Care EveryWhere ID    This is your Care EveryWhere ID. This could be used by other organizations to access your [facility] medical records  [id number]        Primary Care Provider Office Phone # Fax #    Robel Morris PA-C [phone]      Equal Access to Services    OLIVER VARGHESE : kari Carpenter  jolanta, eddi nunez, zulma encarnacion. So [facility] [phone].    ATENCIÓN: Si tish gillette, tiene a pitts disposición servicios gratuitos de asistencia lingüística. Llame al [phone].    We comply with applicable federal civil rights and [place] laws. We do not discriminate on the basis of race, color, national origin, age, disability, sex, sexual orientation or gender identity.                         Thank you!    Thank you for choosing [facility] for your care. Our goal is always to provide you with excellent care. Hearing back from our patients is one way we can continue to improve our services. Please take a few minutes to complete the written survey that you may receive in the mail after you visit with us. Thank you!            Medication List      Medications          Morning Afternoon Evening Bedtime As Needed    garlic 150 MG Tabs tablet  INSTRUCTIONS: Take 150 mg by mouth daily                     glucosamine-chondroitin 500-400 MG Caps per capsule  INSTRUCTIONS: Take 1 capsule by mouth daily                     order for DME  INSTRUCTIONS: [facility] Rash[facility] Vicente  Phone [phone] Fax [phone]  Coolflex Wrap foot and leg measurements in CM  J:29/29  I:40/43  K:18/19  A: 32/37  C:62/66  Lateral Malleolus to Fibular Head: 48/45                     order for DME  INSTRUCTIONS: Flexiplus Pump  Please call patient to evaluate and treat  [facility] Tricia Fax [phone]  Include Facesheet and progress notes with limb measurements                     order for DME  INSTRUCTIONS: [facility] Phone [phone] Fax [phone]  Edemawear Size blue strip Qty 4 sleeves  edemawear size yellow strip 4 sleeves                       ASK your doctor about these medications          Morning Afternoon Evening Bedtime As Needed    Afluria Quadrivalent 0.5 ML injection  INSTRUCTIONS: TO BE ADMINISTERED BY PHARMACIST FOR IMMUNIZATION  Generic drug: influenza  quadrivalent (PF) vacc                     betamethasone dipropionate 0.05 % external ointment  Also known as: DIPROSONE  INSTRUCTIONS: Apply topically daily                     fluticasone 50 MCG/ACT nasal spray  Also known as: FLONASE  INSTRUCTIONS: Spray 1 spray into both nostrils daily as needed for rhinitis or allergies                     furosemide 40 MG tablet  Also known as: LASIX  INSTRUCTIONS: TAKE 1 TABLET BY MOUTH EVERY DAY                     lisinopril 40 MG tablet  Also known as: ZESTRIL  INSTRUCTIONS: TAKE 1 TABLET BY MOUTH EVERY DAY                     loratadine 10 MG tablet  Also known as: CLARITIN  INSTRUCTIONS: Take 10 mg by mouth daily                     Multi-vitamin tablet  INSTRUCTIONS: Take 1 tablet by mouth daily                     silver sulfADIAZINE 1 % external cream  Also known as: SILVADENE  INSTRUCTIONS: APPLY TO AFFECTED AREA TWICE A DAY                     Vasculera Tabs  INSTRUCTIONS: Take 1 tablet by mouth daily

## 2020-10-23 NOTE — ADDENDUM NOTE
Encounter addended by: Camille Hood, PT on: 10/23/2020 2:43 PM   Actions taken: Flowsheet accepted, Charge Capture section accepted

## 2020-10-23 NOTE — DISCHARGE INSTRUCTIONS
LYMPHEDEMA/ GENERAL CONDITIONING HOME PROGRAM AND MAINTENANCE PHASE GUIDELINES    1. Self Manual Lymph Drainage (MLD) massage: Continue to perform your self- lymphatic massage of trunk (neck/ armpit lymphnodes and side trunk sweeps) nightly (1-2x/day)  when using pump as well as when performing your Edema exercises.    2. Leg Edema Exercises: Continue your lymphedema exercise sequence for your leg edema 1x/day; ;ie incorporate into moving throughout day.    3. Strengthening Exercises: standing heel/toe raises and sit to stand with limited or no support of arms as able or option to perform standing mini squats. Prior PT exs (Gastroc/Soleus stretch, standing hip extension and hip extension with theraband at 45 degrees, Single leg standing balance)    4. Wear your compression garment daily (primarily EdemaWear stockinette with Cool Flex garments but can trial newest custom compression stockings intermittently and assess effectiveness of both using pitting response along shin bone.  Option to wear Cool Flex at night then would need to add Cool Flex foot pieces or option to wear Cool Flex foot compression with Edema wear at night    5. Bandage your legs 1x/week can leave on up to 1-2 days if looking and feeling good. Option to place round swell spot fibrotic softening pad over areas of fibrosis (L anterior lateral leg in area of discoloration of skin) pad to only be used 24 hours to limit potential skin irritation. Option to use of 4th bandage ankle to below knee on L leg to provide increase compression and decreased edema.    6. Continue to follow good skin care practices and precautions.     7. Compression garments on average have a 6 month life expectancy depending on type of garment (ready made vs custom) and their use and wear. Replace your day and night compression garments based on signs of wear (such as excessive pilling, running, snags, or holes), loss of elasticity and compression, or improper fit (too tight or  too loose).       A. If your garment is still fitting well: call *** to reorder the same type and size garment.      B. If your garment is not fitting properly (because of change in arm/leg size): call your doctor to get a referral to see your lymphedema therapist for a recheck.      C. You may need to be re-measured for a new compression garment if you have had a significant change in your weight (greater than 15-20 pounds).       D. A new onset of other medical conditions (such as CHF, infection/cellulitis) may indicate the need for new compression garment.    8. Replace your bandages every 12 months or sooner if you notice loss of elasticity or signs of excessive wear and tear.     9. Contact your physician with any worsening of edema not controlled with current home program, signs/symptoms of infection (redness, pain, warmth, fever, increased swelling).    10. You should contact your edema therapist with any questions/concerns regarding your edema/home program. You will need to call your doctor first to get a new referral if treatment is indicated.

## 2020-10-23 NOTE — IP AVS SNAPSHOT
Patient Information     Patient Name  Da Mustafa ([id number]) Sex  Male   1960      Patient Demographics     Address  [address] [address] Phone  [phone] (Home)  [phone] (Mobile) E-mail Address  [email or web address]      Patient Ethnicity & Race     Ethnic Group Patient Race    American White      PCP and Center    Primary Care Provider  Phone Center     Robel Morris [phone] [address] [facility] [address]        Emergency Contact(s)     Name Relation Home Work Mobile    HERB LOU Spouse [phone]    HAZEL Block Son   [phone]      Documents on File        Status Date Received Description       Documents for the Patient    Privacy Notice - [place] Received 11     Insurance Card  () 06     Face Sheet  () 08     Insurance Card  () 08     Face Sheet Received () 02/09/10     External Medication Information Consent Accepted () 11     Consent for Services - Hospital/Clinic Received () 11     Other Received 11 BCBS COB    Consent for Services - Hospital/Clinic Received () 12     External Medication Information Consent Accepted () 12     Consent for EHR Access  13 Copied from existing Consent for services - C/HOD collected on 2012    [facility] Specified Other       External Medication Information Consent Accepted 13     Consent for Services - Hospital/Clinic Received () 13     Consent for Services - Hospital/Clinic Received () 07/20/15     Consent for Services/Privacy Notice - Hospital/Clinic Received () 16     Insurance Card Received () 17     Patient ID Received () 16     Care Everywhere Prospective Auth Received 17     Consent for Services/Privacy Notice - Hospital/Clinic Received () 17     Consent for Services - Hospital and  Clinic Received 18     HIE Auth Received 18     Insurance Card Received () 18 HP 2018    Patient ID Received () 18 exp     Consent to Communicate  18 AUTHORIZATION TO DISCUSS PROTECTED  HEALTH INFORMATION 7/10/18    HIM WOODY Authorization - File Only  18 AUTHORIZATION FOR ELECTRONIC COMMUNICATION    Business/Insurance/Care Coordination/Health Form - Patient  18 ADULT REHABILITATION ATTENDANCE POLICY 7/10/18    Insurance Card Received 19 HP [employer]    Consent for EHR Access Received 19     General Consent Received 19     Insurance Card Received 20     Patient ID Received 20     Insurance Card Received 20     General Consent Virtual       Immunization Record Received 20 IMMUNIZATION RECORD    Patient ID Received (Deleted) 13     Insurance Card Received (Deleted) 11     Insurance Card Received (Deleted) 11     Insurance Card Received (Deleted) 07/20/15 [employer] HP    Patient ID  (Deleted)         Documents for the Encounter    Split Bill Brochure       IM          Admission Information     Current Information     Attending Provider Admitting Provider Admission Type Admission Status       Hospital Outpatient Visit (Confirmed)            Admission Date/Time Discharge Date Hospital Service Auth/Cert Status    10/23/20  01:06 PM               Hospital Area Unit Room/Bed       [facility] ADULT PT                       Admission     Complaint    None      Hospital Account     Not on file

## 2020-10-23 NOTE — IP AVS SNAPSHOT
"    [phone]            Patient Demographics      Name Patient ID SSN Legal Sex Birth Date    Ranjit Mustafa [id number] Male 64 (60 yrs)           Address Phone Email          [address]  [place] [address] [phone] (H)  [phone] (M) [email or web address]             [facility]               Reg Status PCP             Verified Robel Morris [phone]              Marital Status Temple Language           Faith English              Emergency Contact(s)     Name Relation Home Work Mobile    HERB LOU Spouse [phone]    HAZEL Block Son   [phone]      Documents Filed to Patient     Power of  Living Will Code Status MyChart Status    Not on File Not on File Prior Active      Admission Information     Current Information     Attending Provider Admitting Provider Admission Type Admission Status       Hospital Outpatient Visit (Confirmed)            Admission Date/Time Discharge Date Hospital Service Auth/Cert Status    10/23/20  01:06 PM               Hospital Area Unit Room/Bed       [facility] ADULT PT               Point of Origin                                   Hospital Account     Not on file                                              INTERAGENCY TRANSFER FORM - PHYSICIAN ORDERS   10/23/2020                   Hospital Admission Date: 10/23/2020  RANJIT MUSTAFA   : 1960  Sex: Male         Attending Provider: (none)   Allergies: No Known Drug Allergies, Seasonal Allergies    Infection: None   Service: None    Ht: 1.943 m (6' 4.5\")   Wt: --   Admission Wt: --    BMI: --   BSA: --            Patient PCP Information     Provider PCP Type    Robel Morris PA-C General      ED Clinical Impression     Diagnosis Description Comment Added By Time Added    Lymphedema [I89.0] Lymphedema [I89.0]  Camille Hood, PT " 10/23/2020 12:57 PM    Scar condition and fibrosis of skin [L90.5] Scar condition and fibrosis of skin [L90.5]  Camille Hood, PT 10/23/2020 12:57 PM    Physical deconditioning [R53.81] Physical deconditioning [R53.81]  Camille Hood, PT 10/23/2020 12:57 PM    Venous stasis dermatitis of both lower extremities [I87.2] Venous stasis dermatitis of both lower extremities [I87.2]  Camille Hood, PT 10/23/2020 12:57 PM    Decreased strength [R53.1] Decreased strength [R53.1]  Camille Hood, PT 10/23/2020 12:57 PM    Lymphedema of both lower extremities [I89.0] Lymphedema of both lower extremities [I89.0]  Camille Hood, PT 10/23/2020 12:57 PM      Hospital Problem List as of 10/23/2020    None      Non-hospital Problem List as of 10/23/2020             Priority Class Noted    Edema Medium  3/26/2006    Family history of diabetes mellitus Medium  5/5/2011    Seasonal allergic rhinitis Medium  5/19/2014    Hyperlipidemia LDL goal <160 Medium  7/28/2015    Essential hypertension with goal blood pressure less than 140/90 Medium  7/25/2016    Arthritis of left foot Medium  12/27/2017    Class 3 obesity due to excess calories without serious comorbidity with body mass index (BMI) of 50.0 to 59.9 in adult Medium  12/27/2017    Venous stasis dermatitis of both lower extremities Medium  7/3/2018    Peripheral edema Medium  7/3/2018    Lymphedema Medium  7/3/2018    Morbid obesity with BMI of 50.0-59.9, adult (H) Medium  4/24/2019    Severe sepsis (H) Medium  7/6/2020    Lymphedema of both lower extremities Medium  7/29/2020      Current Code Status     Date Active Code Status Order ID Comments User Context       Prior         Medication Review      UNREVIEWED medications. Ask your doctor about these medications       Dose / Directions Comments   Afluria Quadrivalent 0.5 ML injection  Generic drug: influenza quadrivalent (PF) vacc      TO BE ADMINISTERED BY PHARMACIST FOR IMMUNIZATION  Refills: 0      betamethasone  dipropionate 0.05 % external ointment  Commonly known as: DIPROSONE  Used for: Venous stasis dermatitis of both lower extremities      Apply topically daily  Quantity: 100 g  Refills: 3      fluticasone 50 MCG/ACT nasal spray  Commonly known as: FLONASE      Dose: 1 spray  Spray 1 spray into both nostrils daily as needed for rhinitis or allergies  Refills: 0      furosemide 40 MG tablet  Commonly known as: LASIX  Used for: Peripheral edema, Essential hypertension with goal blood pressure less than 140/90      TAKE 1 TABLET BY MOUTH EVERY DAY  Quantity: 90 tablet  Refills: 0      lisinopril 40 MG tablet  Commonly known as: ZESTRIL  Used for: Essential hypertension with goal blood pressure less than 140/90      TAKE 1 TABLET BY MOUTH EVERY DAY  Quantity: 90 tablet  Refills: 0      loratadine 10 MG tablet  Commonly known as: CLARITIN      Dose: 10 mg  Take 10 mg by mouth daily  Refills: 0      Multi-vitamin tablet      Dose: 1 tablet  Take 1 tablet by mouth daily  Refills: 0      silver sulfADIAZINE 1 % external cream  Commonly known as: SILVADENE  Used for: Peripheral edema      APPLY TO AFFECTED AREA TWICE A DAY  Quantity: 85 g  Refills: 1      Vasculera Tabs  Used for: Lymphedema of both lower extremities      Dose: 1 tablet  Take 1 tablet by mouth daily  Quantity: 90 tablet  Refills: 3         CONTINUE these medications which have NOT CHANGED       Dose / Directions Comments   garlic 150 MG Tabs tablet      Dose: 150 mg  Take 150 mg by mouth daily  Refills: 0      glucosamine-chondroitin 500-400 MG Caps per capsule      Dose: 1 capsule  Take 1 capsule by mouth daily  Refills: 0      order for DME  Used for: Lymphedema of both lower extremities      [place] [facility] Rashmi Vicente  Phone [phone] Fax [phone]  Coolflex Wrap foot and leg measurements in CM  J:29/29  I:40/43  K:18/19  A: 32/37  C:62/66  Lateral Malleolus to Fibular Head: 48/45  Quantity: 2 Device  Refills: 0      order for DME  Used for:  Lymphedema of both lower extremities      Flexiplus Pump  Please call patient to evaluate and treat  Cuong Tricia Fax [phone]  Include Facesheet and progress notes with limb measurements  Quantity: 1 Device  Refills: 0      order for DME  Used for: Lymphedema of both lower extremities      [facility] Phone [phone] Fax [phone]  Edemawear Size blue strip Qty 4 sleeves  edemawear size yellow strip 4 sleeves  Quantity: 6 Device  Refills: 0                 Further instructions from your care team       LYMPHEDEMA/ GENERAL CONDITIONING HOME PROGRAM AND MAINTENANCE PHASE GUIDELINES    1. Self Manual Lymph Drainage (MLD) massage: Continue to perform your self- lymphatic massage of trunk (neck/ armpit lymphnodes and side trunk sweeps) nightly (1-2x/day)  when using pump as well as when performing your Edema exercises.    2. Leg Edema Exercises: Continue your lymphedema exercise sequence for your leg edema 1x/day; ;ie incorporate into moving throughout day.    3. Strengthening Exercises: standing heel/toe raises and sit to stand with limited or no support of arms as able or option to perform standing mini squats. Prior PT exs (Gastroc/Soleus stretch, standing hip extension and hip extension with theraband at 45 degrees, Single leg standing balance)    4. Wear your compression garment daily (primarily EdemaWear stockinette with Cool Flex garments but can trial newest custom compression stockings intermittently and assess effectiveness of both using pitting response along shin bone.  Option to wear Cool Flex at night then would need to add Cool Flex foot pieces or option to wear Cool Flex foot compression with Edema wear at night    5. Bandage your legs 1x/week can leave on up to 1-2 days if looking and feeling good. Option to place round swell spot fibrotic softening pad over areas of fibrosis (L anterior lateral leg in area of discoloration of skin) pad to only be used 24 hours to limit potential  "skin irritation. Option to use of 4th bandage ankle to below knee on L leg to provide increase compression and decreased edema.    6. Continue to follow good skin care practices and precautions.     7. Compression garments on average have a 6 month life expectancy depending on type of garment (ready made vs custom) and their use and wear. Replace your day and night compression garments based on signs of wear (such as excessive pilling, running, snags, or holes), loss of elasticity and compression, or improper fit (too tight or too loose).       A. If your garment is still fitting well: call *** to reorder the same type and size garment.      B. If your garment is not fitting properly (because of change in arm/leg size): call your doctor to get a referral to see your lymphedema therapist for a recheck.      C. You may need to be re-measured for a new compression garment if you have had a significant change in your weight (greater than 15-20 pounds).       D. A new onset of other medical conditions (such as CHF, infection/cellulitis) may indicate the need for new compression garment.    8. Replace your bandages every 12 months or sooner if you notice loss of elasticity or signs of excessive wear and tear.     9. Contact your physician with any worsening of edema not controlled with current home program, signs/symptoms of infection (redness, pain, warmth, fever, increased swelling).    10. You should contact your edema therapist with any questions/concerns regarding your edema/home program. You will need to call your doctor first to get a new referral if treatment is indicated.      Your next 10 appointments already scheduled    Oct 28, 2020  1:30 PM  (Arrive by 1:10 PM)  Office Visit with Robel Morris PA-C  [facility] ([facility]) [address] [place] [address] [phone]   Please plan to arrive at the clinic at your \"Arrive By\" time for " your appointment. Our late policy will be enforced based on this time.     Nov 18, 2020 11:15 AM  Lymphedema Treatment with BALJIT Ruano [facility] ([address] [address] [phone]      Nov 18, 2020  2:20 PM  Return Visit with MD ALICIA Loera [facility] [place] ([facility]) [address] [place] [address] 586  [facility] [id number] [phone]

## 2020-10-23 NOTE — IP AVS SNAPSHOT
"    [facility] [place]: [phone]            Patient Demographics     Patient Name  Da Mustafa Sex  Male          Age  1960 (60 year old) N  [id number] Address  [address] [facility] [address] [address] Phone  [phone] (Home)  [phone] (Mobile)      PCP and Center    Primary Care Provider  Phone Center     Robel Morris [phone] [address] [facility] [address]        Emergency Contact(s)     Name Relation Home Work Mobile    HERB LOU Spouse [phone]    HAZEL Block Son   [phone]      Admission Information     Current Information     Attending Provider Admitting Provider Admission Type Admission Status       Hospital Outpatient Visit (Confirmed)            Admission Date/Time Discharge Date Hospital Service Auth/Cert Status    10/23/20  01:06 PM               Hospital Area Unit Room/Bed       [facility] ADULT PT                       Admission     Complaint    None      Hospital Account     Not on file                                                INTERAGENCY TRANSFER FORM - PHYSICIAN ORDERS   10/23/2020                      [facility] [place]: [phone]             Attending Provider: (none)   Allergies: No Known Drug Allergies, Seasonal Allergies    Infection: None   Service: None    Ht: 1.943 m (6' 4.5\")   Wt: --   Admission Wt: --    BMI: --   BSA: --            ED Clinical Impression     Diagnosis Description Comment Added By Time Added    Lymphedema [I89.0] Lymphedema [I89.0]  Camille Hood, PT 10/23/2020 12:57 PM    Scar condition and fibrosis of skin [L90.5] Scar condition and fibrosis of skin [L90.5]  Camille Hood, PT 10/23/2020 12:57 PM    Physical deconditioning [R53.81] Physical deconditioning [R53.81]  Camille Hood, PT 10/23/2020 12:57 PM    Venous stasis dermatitis of both lower extremities [I87.2] Venous stasis dermatitis of both lower extremities " [I87.2]  Camille Hood, PT 10/23/2020 12:57 PM    Decreased strength [R53.1] Decreased strength [R53.1]  Camille Hood, PT 10/23/2020 12:57 PM    Lymphedema of both lower extremities [I89.0] Lymphedema of both lower extremities [I89.0]  Camille Hood, PT 10/23/2020 12:57 PM      Hospital Problem List as of 10/23/2020    None      Non-hospital Problem List as of 10/23/2020             Priority Class Noted    Edema Medium  3/26/2006    Family history of diabetes mellitus Medium  5/5/2011    Seasonal allergic rhinitis Medium  5/19/2014    Hyperlipidemia LDL goal <160 Medium  7/28/2015    Essential hypertension with goal blood pressure less than 140/90 Medium  7/25/2016    Arthritis of left foot Medium  12/27/2017    Class 3 obesity due to excess calories without serious comorbidity with body mass index (BMI) of 50.0 to 59.9 in adult Medium  12/27/2017    Venous stasis dermatitis of both lower extremities Medium  7/3/2018    Peripheral edema Medium  7/3/2018    Lymphedema Medium  7/3/2018    Morbid obesity with BMI of 50.0-59.9, adult (H) Medium  4/24/2019    Severe sepsis (H) Medium  7/6/2020    Lymphedema of both lower extremities Medium  7/29/2020      Code Status History     Date Active Date Inactive Code Status Order ID Comments User Context    7/6/2020  7:07 PM 7/15/2020  1:47 PM Full Code [id number]  Sandeep Lancaster MD Inpatient    Advance Care Planning Activity      Current Code Status     Date Active Code Status Order ID Comments User Context       Prior    Advance Care Planning Activity         Medication Review      UNREVIEWED medications. Ask your doctor about these medications       Dose / Directions Comments   Afluria Quadrivalent 0.5 ML injection  Generic drug: influenza quadrivalent (PF) vacc      TO BE ADMINISTERED BY PHARMACIST FOR IMMUNIZATION  Refills: 0      betamethasone dipropionate 0.05 % external ointment  Commonly known as: DIPROSONE  Used for: Venous stasis dermatitis of both  lower extremities      Apply topically daily  Quantity: 100 g  Refills: 3      fluticasone 50 MCG/ACT nasal spray  Commonly known as: FLONASE      Dose: 1 spray  Spray 1 spray into both nostrils daily as needed for rhinitis or allergies  Refills: 0      furosemide 40 MG tablet  Commonly known as: LASIX  Used for: Peripheral edema, Essential hypertension with goal blood pressure less than 140/90      TAKE 1 TABLET BY MOUTH EVERY DAY  Quantity: 90 tablet  Refills: 0      lisinopril 40 MG tablet  Commonly known as: ZESTRIL  Used for: Essential hypertension with goal blood pressure less than 140/90      TAKE 1 TABLET BY MOUTH EVERY DAY  Quantity: 90 tablet  Refills: 0      loratadine 10 MG tablet  Commonly known as: CLARITIN      Dose: 10 mg  Take 10 mg by mouth daily  Refills: 0      Multi-vitamin tablet      Dose: 1 tablet  Take 1 tablet by mouth daily  Refills: 0      silver sulfADIAZINE 1 % external cream  Commonly known as: SILVADENE  Used for: Peripheral edema      APPLY TO AFFECTED AREA TWICE A DAY  Quantity: 85 g  Refills: 1      Vasculera Tabs  Used for: Lymphedema of both lower extremities      Dose: 1 tablet  Take 1 tablet by mouth daily  Quantity: 90 tablet  Refills: 3         CONTINUE these medications which have NOT CHANGED       Dose / Directions Comments   garlic 150 MG Tabs tablet      Dose: 150 mg  Take 150 mg by mouth daily  Refills: 0      glucosamine-chondroitin 500-400 MG Caps per capsule      Dose: 1 capsule  Take 1 capsule by mouth daily  Refills: 0      order for DME  Used for: Lymphedema of both lower extremities      [facility] [facility] Rashmi Vicente  Phone [phone] Fax [phone]  Coolflex Wrap foot and leg measurements in CM  J:29/29  I:40/43  K:18/19  A: 32/37  C:62/66  Lateral Malleolus to Fibular Head: 48/45  Quantity: 2 Device  Refills: 0      order for DME  Used for: Lymphedema of both lower extremities      Flexiplus Pump  Please call patient to evaluate and treat  Tactile  Tricia Fax [phone]  Include Facesheet and progress notes with limb measurements  Quantity: 1 Device  Refills: 0      order for DME  Used for: Lymphedema of both lower extremities      [facility] Phone [phone] Fax [phone]  Edemawear Size blue strip Qty 4 sleeves  edemawear size yellow strip 4 sleeves  Quantity: 6 Device  Refills: 0                 Further instructions from your care team       LYMPHEDEMA/ GENERAL CONDITIONING HOME PROGRAM AND MAINTENANCE PHASE GUIDELINES    1. Self Manual Lymph Drainage (MLD) massage: Continue to perform your self- lymphatic massage of trunk (neck/ armpit lymphnodes and side trunk sweeps) nightly (1-2x/day)  when using pump as well as when performing your Edema exercises.    2. Leg Edema Exercises: Continue your lymphedema exercise sequence for your leg edema 1x/day; ;ie incorporate into moving throughout day.    3. Strengthening Exercises: standing heel/toe raises and sit to stand with limited or no support of arms as able or option to perform standing mini squats. Prior PT exs (Gastroc/Soleus stretch, standing hip extension and hip extension with theraband at 45 degrees, Single leg standing balance)    4. Wear your compression garment daily (primarily EdemaWear stockinette with Cool Flex garments but can trial newest custom compression stockings intermittently and assess effectiveness of both using pitting response along shin bone.  Option to wear Cool Flex at night then would need to add Cool Flex foot pieces or option to wear Cool Flex foot compression with Edema wear at night    5. Bandage your legs 1x/week can leave on up to 1-2 days if looking and feeling good. Option to place round swell spot fibrotic softening pad over areas of fibrosis (L anterior lateral leg in area of discoloration of skin) pad to only be used 24 hours to limit potential skin irritation. Option to use of 4th bandage ankle to below knee on L leg to provide increase compression and  "decreased edema.    6. Continue to follow good skin care practices and precautions.     7. Compression garments on average have a 6 month life expectancy depending on type of garment (ready made vs custom) and their use and wear. Replace your day and night compression garments based on signs of wear (such as excessive pilling, running, snags, or holes), loss of elasticity and compression, or improper fit (too tight or too loose).       A. If your garment is still fitting well: call *** to reorder the same type and size garment.      B. If your garment is not fitting properly (because of change in arm/leg size): call your doctor to get a referral to see your lymphedema therapist for a recheck.      C. You may need to be re-measured for a new compression garment if you have had a significant change in your weight (greater than 15-20 pounds).       D. A new onset of other medical conditions (such as CHF, infection/cellulitis) may indicate the need for new compression garment.    8. Replace your bandages every 12 months or sooner if you notice loss of elasticity or signs of excessive wear and tear.     9. Contact your physician with any worsening of edema not controlled with current home program, signs/symptoms of infection (redness, pain, warmth, fever, increased swelling).    10. You should contact your edema therapist with any questions/concerns regarding your edema/home program. You will need to call your doctor first to get a new referral if treatment is indicated.      Your next 10 appointments already scheduled    Oct 28, 2020  1:30 PM  (Arrive by 1:10 PM)  Office Visit with Robel Morris PA-C  [facility] ([facility]) [address] [place] [address] [phone]   Please plan to arrive at the clinic at your \"Arrive By\" time for your appointment. Our late policy will be enforced based on this time.     Nov 18, 2020 11:15 AM  Lymphedema " "Treatment with Camille Hood PT  [facility] [place] ([facility]) [address] [facility]  [place] [address] [phone]      Nov 18, 2020  2:20 PM  Return Visit with Venkata Valderrama MD  [facility] [place] ([facility]) [address]  [address]  [place] [id number] [phone]                                             INTERAGENCY TRANSFER FORM - NURSING   10/23/2020                      [facility] [place]: [phone]             Attending Provider: (none)   Allergies: No Known Drug Allergies, Seasonal Allergies    Infection: None   Service: None    Ht: 1.943 m (6' 4.5\")   Wt: --   Admission Wt: --    BMI: --   BSA: --            Advance Directives        Scanned docmt in ACP Activity?            No scanned doc           Immunizations     Name Date      INFLUENZA VACCINE IM > 6 MONTHS VALENT IIV4 08/22/20     TDAP Vaccine (Adacel) 05/19/14     Tetanus 02/27/06       ASSESSMENT     Discharge Profile Flowsheet     DISCHARGE NEEDS ASSESSMENT     COMMUNICATION ASSESSMENT      Patient/Family Anticipates Transition to  home with family  07/06/20 1945   Patient's communication style  spoken language (English or Bilingual)  07/06/20 1945            Vitals     Vital Signs Flowsheet             Patient Lines/Drains/Airways Status    Active LINES/DRAINS/AIRWAYS     Name: Placement date: Placement time: Site: Days: Last dressing change:    Wound 07/08/20 Anterior;Left Ankle  07/08/20   1437      106     Wound 07/08/20 Anterior;Right Tibial  07/08/20   1438      106     Wound 07/08/20 Anterior;Right Toe (Comment  which one)  07/08/20 2000      106     Wound 07/08/20 Anterior;Right Toe (Comment  which one)  07/08/20 2000      106             Patient Lines/Drains/Airways Status    Active PICC/CVC     None            Intake/Output Detail Report     None      Case Management/Discharge Planning     Case Management/Discharge " Planning Flowsheet                   [facility] [place]: [phone]            Medication Administration Report for Vitaly Mustafa as of 10/23/20 1336   No medications to display             INTERAGENCY TRANSFER FORM - NOTES (H&P, Discharge Summary, Consults, Procedures, Therapies)   10/23/2020                      [facility] [place]: [phone]            History & Physicals    No notes of this type exist for this encounter.     Discharge Summaries    No notes of this type exist for this encounter.     Consult Notes    No notes of this type exist for this encounter.     Progress Notes - Physician (Notes for yesterday and today)    No notes of this type exist for this encounter.     Procedure Notes    No notes of this type exist for this encounter.     Progress Notes - Therapies (Notes from 10/20/20 through 10/23/20)    No notes of this type exist for this encounter.                                         INTERAGENCY TRANSFER FORM - LAB / IMAGING / EKG / EMG RESULTS   10/23/2020                      [facility] [place]: [phone]            Unresulted Labs (24h ago, onward)    None      Encounter-Level Documents:    There are no encounter-level documents.     Order-Level Documents:    There are no order-level documents.

## 2020-10-23 NOTE — IP AVS SNAPSHOT
"[facility] WANDER: [phone]                                              INTERAGENCY TRANSFER FORM - NURSING   10/23/2020                   Hospital Admission Date: 10/23/2020  RANJIT COSME   : 1960  Sex: Male         Attending Provider: (none)   Allergies: No Known Drug Allergies, Seasonal Allergies    Infection: None   Service: None    Ht: 1.943 m (6' 4.5\")   Wt: --   Admission Wt: --    BMI: --   BSA: --            Patient PCP Information     Provider PCP Type    Robel Morris PA-C General      Current Code Status     Date Active Code Status Order ID Comments User Context       Prior    Advance Care Planning Activity      Code Status History     Date Active Date Inactive Code Status Order ID Comments User Context    2020  7:07 PM 7/15/2020  1:47 PM Full Code [id number]  Sandeep Lancaster MD Inpatient    Advance Care Planning Activity      Advance Directives        Scanned docmt in ACP Activity?            No scanned doc           Hospital Problem List as of 10/23/2020    None      Non-hospital Problem List as of 10/23/2020             Priority Class Noted    Edema Medium  3/26/2006    Family history of diabetes mellitus Medium  2011    Seasonal allergic rhinitis Medium  2014    Hyperlipidemia LDL goal <160 Medium  2015    Essential hypertension with goal blood pressure less than 140/90 Medium  2016    Arthritis of left foot Medium  2017    Class 3 obesity due to excess calories without serious comorbidity with body mass index (BMI) of 50.0 to 59.9 in adult Medium  2017    Venous stasis dermatitis of both lower extremities Medium  7/3/2018    Peripheral edema Medium  7/3/2018    Lymphedema Medium  7/3/2018    Morbid obesity with BMI of 50.0-59.9, adult (H) Medium  2019    Severe sepsis (H) Medium  2020    Lymphedema of both lower extremities Medium  2020      Immunizations     Name Date      INFLUENZA " VACCINE IM > 6 MONTHS VALENT IIV4 08/22/20     TDAP Vaccine (Adacel) 05/19/14     Tetanus 02/27/06          END      ASSESSMENT     Discharge Profile Flowsheet     DISCHARGE NEEDS ASSESSMENT     COMMUNICATION ASSESSMENT      Patient/Family Anticipates Transition to  home with family  07/06/20 1945   Patient's communication style  spoken language (English or Bilingual)  07/06/20 1945            Vitals     Vital Signs Flowsheet             Patient Lines/Drains/Airways Status    Active LINES/DRAINS/AIRWAYS     Name: Placement date: Placement time: Site: Days: Last dressing change:    Wound 07/08/20 Anterior;Left Ankle  07/08/20   1437      106     Wound 07/08/20 Anterior;Right Tibial  07/08/20   1438      106     Wound 07/08/20 Anterior;Right Toe (Comment  which one)  07/08/20 2000      106     Wound 07/08/20 Anterior;Right Toe (Comment  which one)  07/08/20 2000      106             Patient Lines/Drains/Airways Status    Active PICC/CVC     None            Intake/Output Detail Report     None      Case Management/Discharge Planning     Case Management/Discharge Planning Flowsheet

## 2020-10-23 NOTE — IP AVS SNAPSHOT
[facility] WANDER: [phone]            Medication Administration Report for RosifernandoVitaly as of 10/23/20 1336   No medications to display

## 2020-10-28 ENCOUNTER — OFFICE VISIT (OUTPATIENT)
Dept: FAMILY MEDICINE | Facility: CLINIC | Age: 60
End: 2020-10-28
Payer: COMMERCIAL

## 2020-10-28 VITALS
HEART RATE: 72 BPM | BODY MASS INDEX: 50.22 KG/M2 | SYSTOLIC BLOOD PRESSURE: 130 MMHG | WEIGHT: 315 LBS | OXYGEN SATURATION: 98 % | DIASTOLIC BLOOD PRESSURE: 80 MMHG | TEMPERATURE: 97.3 F | RESPIRATION RATE: 14 BRPM

## 2020-10-28 DIAGNOSIS — Z01.89 PATIENT REQUEST FOR DIAGNOSTIC TESTING: ICD-10-CM

## 2020-10-28 DIAGNOSIS — R60.0 PERIPHERAL EDEMA: ICD-10-CM

## 2020-10-28 DIAGNOSIS — E66.01 MORBID OBESITY (H): ICD-10-CM

## 2020-10-28 DIAGNOSIS — Z71.89 ADVANCED CARE PLANNING/COUNSELING DISCUSSION: ICD-10-CM

## 2020-10-28 DIAGNOSIS — I10 ESSENTIAL HYPERTENSION WITH GOAL BLOOD PRESSURE LESS THAN 140/90: Primary | ICD-10-CM

## 2020-10-28 PROCEDURE — 99213 OFFICE O/P EST LOW 20 MIN: CPT | Performed by: PHYSICIAN ASSISTANT

## 2020-10-28 RX ORDER — LISINOPRIL 40 MG/1
40 TABLET ORAL DAILY
Qty: 90 TABLET | Refills: 1 | Status: SHIPPED | OUTPATIENT
Start: 2020-10-28 | End: 2021-03-19

## 2020-10-28 NOTE — PROGRESS NOTES
Subjective     Da Mustafa is a 60 year old male who presents to clinic today for the following health issues:    History of Present Illness       Hypertension: He presents for follow up of hypertension.  He does not check blood pressure  regularly outside of the clinic. Outpatient blood pressures have not been over 140/90. He follows a low salt diet.     He eats 4 or more servings of fruits and vegetables daily.He consumes 0 sweetened beverage(s) daily.He exercises with enough effort to increase his heart rate 9 or less minutes per day.  He exercises with enough effort to increase his heart rate 3 or less days per week.   He is taking medications regularly.               Review of Systems   Constitutional, HEENT, cardiovascular, pulmonary, GI, , musculoskeletal, neuro, skin, endocrine and psych systems are negative, except as otherwise noted.      Objective    /80 (BP Location: Right arm, Patient Position: Chair, Cuff Size: Adult Large)   Pulse 72   Temp 97.3  F (36.3  C) (Oral)   Resp 14   Wt (!) 189.6 kg (418 lb)   SpO2 98%   BMI 50.22 kg/m    Body mass index is 50.22 kg/m .  Physical Exam   GENERAL: alert, no distress and obese  NECK: no adenopathy, no asymmetry, masses, or scars and thyroid normal to palpation  RESP: lungs clear to auscultation - no rales, rhonchi or wheezes  CV: regular rates and rhythm, normal S1 S2, no S3 or S4 and no murmur, click or rub  PSYCH: mentation appears normal, affect normal/bright          Assessment & Plan     Essential hypertension with goal blood pressure less than 140/90  Stable. Has lost considerable weight since last visit. May be contributing. Will continue on current dose and recheck labs in 3 months at lab only. These are future ordered. Follow up physical in 9 months.     Peripheral edema  Stable. Followed by lymphedema clinic.     Morbid obesity (H)  Continues to improve with diet. Keep up the great work!    Patient request for diagnostic  "testing    - ABO/Rh type and screen; Future     BMI:   Estimated body mass index is 50.22 kg/m  as calculated from the following:    Height as of 7/6/20: 1.943 m (6' 4.5\").    Weight as of this encounter: 189.6 kg (418 lb).   Weight management plan: Discussed healthy diet and exercise guidelines         Return in about 3 months (around 1/28/2021) for Lab Work.    Robel Morris PA-C  [facility]    "

## 2020-11-06 ENCOUNTER — HOSPITAL ENCOUNTER (OUTPATIENT)
Dept: CT IMAGING | Facility: CLINIC | Age: 60
Discharge: HOME | End: 2020-11-06
Attending: SURGERY | Admitting: SURGERY
Payer: COMMERCIAL

## 2020-11-06 DIAGNOSIS — R60.0 PERIPHERAL EDEMA: ICD-10-CM

## 2020-11-06 DIAGNOSIS — E66.01 MORBID OBESITY WITH BMI OF 50.0-59.9, ADULT (H): ICD-10-CM

## 2020-11-06 DIAGNOSIS — I89.0 LYMPHEDEMA OF BOTH LOWER EXTREMITIES: ICD-10-CM

## 2020-11-06 DIAGNOSIS — I87.2 VENOUS STASIS DERMATITIS OF BOTH LOWER EXTREMITIES: ICD-10-CM

## 2020-11-06 DIAGNOSIS — I89.0 LYMPHEDEMA: ICD-10-CM

## 2020-11-06 PROCEDURE — 74177 CT ABD & PELVIS W/CONTRAST: CPT

## 2020-11-06 PROCEDURE — 250N000011 HC RX IP 250 OP 636: Performed by: SURGERY

## 2020-11-06 RX ORDER — IOPAMIDOL 755 MG/ML
100 INJECTION, SOLUTION INTRAVASCULAR ONCE
Status: COMPLETED | OUTPATIENT
Start: 2020-11-06 | End: 2020-11-06

## 2020-11-06 RX ADMIN — IOPAMIDOL 100 ML: 755 INJECTION, SOLUTION INTRAVENOUS at 10:24

## 2020-11-18 ENCOUNTER — HOSPITAL ENCOUNTER (OUTPATIENT)
Dept: PHYSICAL THERAPY | Facility: CLINIC | Age: 60
End: 2020-11-18
Payer: COMMERCIAL

## 2020-11-18 ENCOUNTER — HOSPITAL ENCOUNTER (OUTPATIENT)
Dept: WOUND CARE | Facility: CLINIC | Age: 60
Discharge: HOME | End: 2020-11-18
Attending: SURGERY | Admitting: SURGERY
Payer: COMMERCIAL

## 2020-11-18 VITALS
HEART RATE: 96 BPM | RESPIRATION RATE: 12 BRPM | TEMPERATURE: 98.2 F | SYSTOLIC BLOOD PRESSURE: 144 MMHG | DIASTOLIC BLOOD PRESSURE: 83 MMHG

## 2020-11-18 DIAGNOSIS — R53.81 PHYSICAL DECONDITIONING: ICD-10-CM

## 2020-11-18 DIAGNOSIS — I89.0 LYMPHEDEMA: Primary | ICD-10-CM

## 2020-11-18 DIAGNOSIS — I87.2 VENOUS STASIS DERMATITIS OF BOTH LOWER EXTREMITIES: ICD-10-CM

## 2020-11-18 DIAGNOSIS — R53.1 DECREASED STRENGTH: ICD-10-CM

## 2020-11-18 DIAGNOSIS — R60.0 PERIPHERAL EDEMA: ICD-10-CM

## 2020-11-18 DIAGNOSIS — I89.0 LYMPHEDEMA OF BOTH LOWER EXTREMITIES: ICD-10-CM

## 2020-11-18 DIAGNOSIS — L90.5 SCAR CONDITION AND FIBROSIS OF SKIN: ICD-10-CM

## 2020-11-18 DIAGNOSIS — I89.0 LYMPHEDEMA: ICD-10-CM

## 2020-11-18 DIAGNOSIS — E66.01 MORBID OBESITY WITH BMI OF 50.0-59.9, ADULT (H): ICD-10-CM

## 2020-11-18 DIAGNOSIS — R60.0 LOCALIZED EDEMA: Primary | ICD-10-CM

## 2020-11-18 PROCEDURE — G0463 HOSPITAL OUTPT CLINIC VISIT: HCPCS

## 2020-11-18 PROCEDURE — 97110 THERAPEUTIC EXERCISES: CPT | Mod: GP | Performed by: PHYSICAL THERAPIST

## 2020-11-18 PROCEDURE — 97140 MANUAL THERAPY 1/> REGIONS: CPT | Mod: GP | Performed by: PHYSICAL THERAPIST

## 2020-11-18 PROCEDURE — 97535 SELF CARE MNGMENT TRAINING: CPT | Mod: GP | Performed by: PHYSICAL THERAPIST

## 2020-11-18 PROCEDURE — 99212 OFFICE O/P EST SF 10 MIN: CPT | Performed by: SURGERY

## 2020-11-18 NOTE — DISCHARGE INSTRUCTIONS
[facility]  [address] [facility] [address] [place] [address]  Appointment Phone [phone]     Da KENDRICK Ramsey      1960  Your wound is Healed!! Dressings are no longer required.   Apply ceramide based lotion to legs daily,   Compression:Apply compression sock and wear daily for the rest of your life.  You have a compression socks or wraps that are supposed to be removed at night and put back on first thing in the morning.   Please remove compression dressing if toes turn blue and/or tingle and can not be relieved by raising the leg for one hour.      BALJIT Valderrama M.D.. November 18, 2020    Once the ulcer has healed, you will need to use compression stocking to help the pumping action in your veins. The stockings will also reduce swelling.  Home care  Follow these tips when caring for yourself at home:    Use any special compression dressings or stockings as directed.    Walk regularly. This helps the blood flow better in your legs.    Maintain a healthy weight. If you are overweight, talk with your provider about a weight loss program.    If you smoke, quit smoking. This will ease your symptoms and lower the chance that the disease will get worse. Join a stop-smoking program or ask your provider for help in quitting.    Check your feet and legs for skin breaks or color changes. Report these to your provider. This could be an early sign of an ulcer.    When standing, shift your weight from one leg to the other.    When sitting for long periods, put your feet up. Move your feet and ankles often to get your calf muscles moving. Get up and walk from time to time.

## 2020-11-18 NOTE — PROGRESS NOTES
[facility] [place] Progress Note    Subject: Da Mustafa significant improvement of left leg dermatitis, associated with lymphedema and severe obesity, he has lost approximately 40 pounds with time restricted eating into an 8-hour window.  Increased ambulatory status.  Utilizing EdemaWear and Sigvaris CoolFlex.  Elevating legs when sitting.  Compared to photographs of July 29, 2020.    Patient Active Problem List   Diagnosis     Edema     Family history of diabetes mellitus     Seasonal allergic rhinitis     Hyperlipidemia LDL goal <160     Essential hypertension with goal blood pressure less than 140/90     Arthritis of left foot     Class 3 obesity due to excess calories without serious comorbidity with body mass index (BMI) of 50.0 to 59.9 in adult     Venous stasis dermatitis of both lower extremities     Peripheral edema     Lymphedema     Morbid obesity with BMI of 50.0-59.9, adult (H)     Severe sepsis (H)     Lymphedema of both lower extremities     Past Medical History:   Diagnosis Date     Class 3 obesity due to excess calories without serious comorbidity with body mass index (BMI) of 50.0 to 59.9 in adult 12/27/2017     Edema      Essential hypertension with goal blood pressure less than 140/90 7/25/2016     Essential hypertension, benign      Hyperlipidemia LDL goal <160 7/28/2015     Hypertension goal BP (blood pressure) < 140/90 4/25/2013     Obesity, unspecified      Seasonal allergic rhinitis 5/19/2014     Sleep apnea      Exam:  BP (!) 144/83 (BP Location: Left arm)   Pulse 96   Temp 98.2  F (36.8  C) (Temporal)   Resp 12      60-year-old male, significant resolution of dermatitis of the left lower extremity, excellent control of interstitial edema, no ulcerations, no lymphorrhea        Impression: Resolution of dermatitis left lower extremity, improved lymphedema cares    Plan: Continue recommendations of certified lymphedema therapy, continue advanced proprietary   lymphedema pump, skin cares with EpiCeram or ceramide-based cream, edema wear, Sigvaris CoolFlex, will obtain 30 to 40 mm weight compression stockings, open toed her insurance allowance.  We will continue follow-up with certified lymphedema therapist, will follow up as as as needed if he develops ulcerations.      Venkata Valderrama MD on 11/18/2020 at 5:07 PM

## 2020-11-18 NOTE — PROGRESS NOTES
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 Feeling confident in current home program    Objective Measurements:  Objective Measure: Edema  Details: 1+ B pretibial  Objective Measure: volume to 50cm  Details: L= 6575ml (decreased 46%), U=5429hg (decreased 25.5%); L>R=8.2%  Objective Measure: skin  Details: intact skin, dry and flaky  Objective Measure: STS  Details: 12       Goals:  Goal Identifier MET: HOme program   Goal Description Patient to demonstrate independece in HEP for LE lymphedema and deconditioning   Target Date 20   Date Met   2020   Progress:     Goal Identifier Goal Met:  volume LEs   Goal Description Decreased B LE volume by 10% to progress wound healin and decrease risk of infection   Target Date 10/31/20   Date Met   10/15/2020   Progress:     Goal Identifier MET: LLIS   Goal Description Decrase LLIS by 8 points to demonstrate decreased impact of edema on function   Target Date 20   Date Met   2020   Progress:     Goal Identifier Goal partially Met; Progressing: STS in 30\"   Goal Description Patient to be able to perform 13 STS in 30\" without UE support to demonstrate improved funcitonal LE strength and decrease risk of falls   Target Date 10/31/20   Date Met   10/15/2020   Progress:         Progress Toward Goals:   Progress this reporting period: goals 1-3 met and goal 4 sufficient for discharge      Plan:  Discharge from therapy.    Discharge:    Reason for Discharge: Patient has met all goals.    Equipment Issued: wearing Edema Wear stockinette size small foot/ankle and size medium, doubled B lower legs under " Sigvaris Coolflex compression and using pump.    Discharge Plan: Patient to continue home program.

## 2020-11-22 ENCOUNTER — HEALTH MAINTENANCE LETTER (OUTPATIENT)
Age: 60
End: 2020-11-22

## 2021-01-11 DIAGNOSIS — I10 ESSENTIAL HYPERTENSION WITH GOAL BLOOD PRESSURE LESS THAN 140/90: ICD-10-CM

## 2021-01-11 DIAGNOSIS — R60.0 PERIPHERAL EDEMA: ICD-10-CM

## 2021-01-11 RX ORDER — FUROSEMIDE 40 MG
TABLET ORAL
Qty: 90 TABLET | Refills: 0 | Status: SHIPPED | OUTPATIENT
Start: 2021-01-11 | End: 2021-03-19

## 2021-01-11 NOTE — TELEPHONE ENCOUNTER
Routing refill request to provider for review/approval because:  Elevated BP    Stephanie Davis RN on 1/11/2021 at 10:57 AM

## 2021-03-05 DIAGNOSIS — J30.2 SEASONAL ALLERGIC RHINITIS, UNSPECIFIED TRIGGER: Primary | ICD-10-CM

## 2021-03-05 RX ORDER — FLUTICASONE PROPIONATE 50 MCG
SPRAY, SUSPENSION (ML) NASAL
Qty: 16 ML | Refills: 2 | Status: SHIPPED | OUTPATIENT
Start: 2021-03-05 | End: 2021-07-16

## 2021-03-05 NOTE — TELEPHONE ENCOUNTER
Routing refill request to provider for review/approval because:  Medication is reported/historical    Zari Damian, RN   [facility] -- Triage Nurse

## 2021-03-17 ENCOUNTER — MYC MEDICAL ADVICE (OUTPATIENT)
Dept: FAMILY MEDICINE | Facility: CLINIC | Age: 61
End: 2021-03-17

## 2021-03-17 NOTE — TELEPHONE ENCOUNTER
Called and LVM for [facility] Pre-Surgical RN staff to call back.     Davonte SMILEY RN

## 2021-03-17 NOTE — TELEPHONE ENCOUNTER
Received call back from Aj GEORGES at [facility] with [facility]. Aj has been in contact with patient and stated he needs an order for the COVID test in order to schedule. Aj stated this order could be from Dr. Johnson (surgeon performing cataract surgery) or his primary. JOSÉ MANUEL ANDRES stated this order should come from the surgeon so that the results go to the performing surgeon. Aj stated he would give Dr. Johnson's office a call in order to obtain the COVID test order. [employer] message sent to patient.     Davonte SMILEY RN

## 2021-03-19 ENCOUNTER — DOCUMENTATION ONLY (OUTPATIENT)
Dept: FAMILY MEDICINE | Facility: CLINIC | Age: 61
End: 2021-03-19

## 2021-03-19 ENCOUNTER — OFFICE VISIT (OUTPATIENT)
Dept: FAMILY MEDICINE | Facility: CLINIC | Age: 61
End: 2021-03-19
Payer: COMMERCIAL

## 2021-03-19 VITALS
HEART RATE: 70 BPM | RESPIRATION RATE: 18 BRPM | DIASTOLIC BLOOD PRESSURE: 70 MMHG | SYSTOLIC BLOOD PRESSURE: 118 MMHG | BODY MASS INDEX: 38.36 KG/M2 | HEIGHT: 76 IN | TEMPERATURE: 98 F | WEIGHT: 315 LBS | OXYGEN SATURATION: 99 %

## 2021-03-19 DIAGNOSIS — Z01.818 PREOP GENERAL PHYSICAL EXAM: Primary | ICD-10-CM

## 2021-03-19 DIAGNOSIS — H26.9 CATARACT OF BOTH EYES, UNSPECIFIED CATARACT TYPE: ICD-10-CM

## 2021-03-19 DIAGNOSIS — I10 ESSENTIAL HYPERTENSION WITH GOAL BLOOD PRESSURE LESS THAN 140/90: ICD-10-CM

## 2021-03-19 DIAGNOSIS — R60.0 PERIPHERAL EDEMA: ICD-10-CM

## 2021-03-19 LAB
ANION GAP SERPL CALCULATED.3IONS-SCNC: <1 MMOL/L (ref 3–14)
BUN SERPL-MCNC: 19 MG/DL (ref 7–30)
CALCIUM SERPL-MCNC: 8.7 MG/DL (ref 8.5–10.1)
CHLORIDE SERPL-SCNC: 107 MMOL/L (ref 94–109)
CO2 SERPL-SCNC: 31 MMOL/L (ref 20–32)
CREAT SERPL-MCNC: 0.79 MG/DL (ref 0.66–1.25)
GFR SERPL CREATININE-BSD FRML MDRD: >90 ML/MIN/{1.73_M2}
GLUCOSE SERPL-MCNC: 92 MG/DL (ref 70–99)
POTASSIUM SERPL-SCNC: 3.9 MMOL/L (ref 3.4–5.3)
SODIUM SERPL-SCNC: 138 MMOL/L (ref 133–144)

## 2021-03-19 PROCEDURE — 80048 BASIC METABOLIC PNL TOTAL CA: CPT | Performed by: PHYSICIAN ASSISTANT

## 2021-03-19 PROCEDURE — 99214 OFFICE O/P EST MOD 30 MIN: CPT | Performed by: PHYSICIAN ASSISTANT

## 2021-03-19 PROCEDURE — 36415 COLL VENOUS BLD VENIPUNCTURE: CPT | Performed by: PHYSICIAN ASSISTANT

## 2021-03-19 RX ORDER — FUROSEMIDE 40 MG
40 TABLET ORAL DAILY
Qty: 90 TABLET | Refills: 1 | Status: SHIPPED | OUTPATIENT
Start: 2021-03-19 | End: 2021-09-17

## 2021-03-19 RX ORDER — LISINOPRIL 40 MG/1
40 TABLET ORAL DAILY
Qty: 90 TABLET | Refills: 1 | Status: SHIPPED | OUTPATIENT
Start: 2021-03-19 | End: 2021-09-17

## 2021-03-19 ASSESSMENT — PAIN SCALES - GENERAL: PAINLEVEL: NO PAIN (0)

## 2021-03-19 ASSESSMENT — MIFFLIN-ST. JEOR: SCORE: 2858.36

## 2021-03-19 NOTE — PROGRESS NOTES
[address] [address]  Phone: [phone]  Primary Provider: Atul Del Rosario  Pre-op Performing Provider: ATUL DEL ROSARIO    PREOPERATIVE EVALUATION:  Today's date: 3/19/2021    Da Mustafa is a 60 year old male who presents for a preoperative evaluation.    Surgical Information:  Surgery/Procedure: left kelman phacoemulsification   Surgery Location: [facility]   Surgeon: joey Grace  Surgery Date: 03/22/2021  Time of Surgery: 6:45am   Where patient plans to recover: At home with family  Fax number for surgical facility:[phone]    Type of Anesthesia Anticipated: Local with MAC    Assessment & Plan     The proposed surgical procedure is considered LOW risk.    Preop general physical exam  Normal exam. No concerns.     Essential hypertension with goal blood pressure less than 140/90      Peripheral edema      Cataract of both eyes, unspecified cataract type        Possible Sleep Apnea: history of sleep cpap used.   No flowsheet data found.         Risks and Recommendations:  The patient has the following additional risks and recommendations for perioperative complications:      Medication Instructions:  Patient is to take all scheduled medications on the day of surgery   - ACE/ARB: May be continued on the day of surgery.     RECOMMENDATION:  APPROVAL GIVEN to proceed with proposed procedure, without further diagnostic evaluation.  Obesity      32 minutes spent on the date of the encounter doing chart review, patient visit and documentation       Subjective     HPI related to upcoming procedure: history of bilateral cataract. The above procedure was deemed the next best step in management.     Preop Questions 3/18/2021   1. Have you ever had a heart attack or stroke? No   2. Have you ever had surgery on your heart or blood vessels, such as a stent placement, a coronary artery bypass, or surgery on an  artery in your head, neck, heart, or legs? No   3. Do you have chest pain with activity? No   4. Do you have a history of  heart failure? No   5. Do you currently have a cold, bronchitis or symptoms of other infection? No   6. Do you have a cough, shortness of breath, or wheezing? No   7. Do you or anyone in your family have previous history of blood clots? UNKNOWN -    8. Do you or does anyone in your family have a serious bleeding problem such as prolonged bleeding following surgeries or cuts? No   9. Have you ever had problems with anemia or been told to take iron pills? No   10. Have you had any abnormal blood loss such as black, tarry or bloody stools? No   11. Have you ever had a blood transfusion? No   12. Are you willing to have a blood transfusion if it is medically needed before, during, or after your surgery? Yes   13. Have you or any of your relatives ever had problems with anesthesia? No   14. Do you have sleep apnea, excessive snoring or daytime drowsiness? YES -    14a. Do you have a CPAP machine? Yes   15. Do you have any artifical heart valves or other implanted medical devices like a pacemaker, defibrillator, or continuous glucose monitor? No   16. Do you have artificial joints? No   17. Are you allergic to latex? No       Health Care Directive:  Patient does not have a Health Care Directive or Living Will: Discussed advance care planning with patient; information given to patient to review.    Preoperative Review of :   reviewed - controlled substances reflected in medication list.    Status of Chronic Conditions:  See problem list for active medical problems.  Problems all longstanding and stable, except as noted/documented.  See ROS for pertinent symptoms related to these conditions.    HYPERTENSION - Patient has longstanding history of HTN , currently denies any symptoms referable to elevated blood pressure. Specifically denies chest pain, palpitations, dyspnea, orthopnea, PND or  peripheral edema. Blood pressure readings have been in normal range. Current medication regimen is as listed below. Patient denies any side effects of medication.       Review of Systems  CONSTITUTIONAL: NEGATIVE for fever, chills, change in weight  INTEGUMENTARY/SKIN: NEGATIVE for worrisome rashes, moles or lesions  EYES: NEGATIVE for vision changes or irritation  ENT/MOUTH: NEGATIVE for ear, mouth and throat problems  RESP: NEGATIVE for significant cough or SOB  CV: NEGATIVE for chest pain, palpitations or peripheral edema  GI: NEGATIVE for nausea, abdominal pain, heartburn, or change in bowel habits  : NEGATIVE for frequency, dysuria, or hematuria  MUSCULOSKELETAL: NEGATIVE for significant arthralgias or myalgia  NEURO: NEGATIVE for weakness, dizziness or paresthesias  ENDOCRINE: NEGATIVE for temperature intolerance, skin/hair changes  HEME: NEGATIVE for bleeding problems  PSYCHIATRIC: NEGATIVE for changes in mood or affect    Patient Active Problem List    Diagnosis Date Noted     Lymphedema of both lower extremities 07/29/2020     Priority: Medium     Severe sepsis (H) 07/06/2020     Priority: Medium     Morbid obesity with BMI of 50.0-59.9, adult (H) 04/24/2019     Priority: Medium     Venous stasis dermatitis of both lower extremities 07/03/2018     Priority: Medium     Peripheral edema 07/03/2018     Priority: Medium     Lymphedema 07/03/2018     Priority: Medium     Arthritis of left foot 12/27/2017     Priority: Medium     Class 3 obesity due to excess calories without serious comorbidity with body mass index (BMI) of 50.0 to 59.9 in adult 12/27/2017     Priority: Medium     Essential hypertension with goal blood pressure less than 140/90 07/25/2016     Priority: Medium     Hyperlipidemia LDL goal <160 07/28/2015     Priority: Medium     5.1% 10 yr risk 7/2015       Seasonal allergic rhinitis 05/19/2014     Priority: Medium     Family history of diabetes mellitus 05/05/2011     Priority: Medium     Edema  03/26/2006     Priority: Medium      Past Medical History:   Diagnosis Date     Class 3 obesity due to excess calories without serious comorbidity with body mass index (BMI) of 50.0 to 59.9 in adult 12/27/2017     Edema      Essential hypertension with goal blood pressure less than 140/90 7/25/2016     Essential hypertension, benign      Hyperlipidemia LDL goal <160 7/28/2015     Hypertension goal BP (blood pressure) < 140/90 4/25/2013     Obesity, unspecified      Seasonal allergic rhinitis 5/19/2014     Sleep apnea      Past Surgical History:   Procedure Laterality Date     COLONOSCOPY  12/4/2013    Procedure: COLONOSCOPY;  Colonoscopy  ;  Surgeon: Beny Rich MD;  Location: [facility] NONSPECIFIC PROCEDURE       Current Outpatient Medications   Medication Sig Dispense Refill     furosemide (LASIX) 40 MG tablet Take 1 tablet (40 mg) by mouth daily 90 tablet 1     lisinopril (ZESTRIL) 40 MG tablet Take 1 tablet (40 mg) by mouth daily 90 tablet 1     AFLURIA QUADRIVALENT 0.5 ML injection TO BE ADMINISTERED BY PHARMACIST FOR IMMUNIZATION       betamethasone dipropionate (DIPROSONE) 0.05 % external ointment Apply topically daily 100 g 3     Dietary Management Product (VASCULERA) TABS Take 1 tablet by mouth daily 90 tablet 3     fluticasone (FLONASE) 50 MCG/ACT nasal spray SPRAY 2 SPRAYS INTO BOTH NOSTRILS DAILY 16 mL 2     garlic 150 MG TABS tablet Take 150 mg by mouth daily       glucosamine-chondroitin 500-400 MG CAPS per capsule Take 1 capsule by mouth daily       loratadine (CLARITIN) 10 MG tablet Take 10 mg by mouth daily       multivitamin w/minerals (MULTI-VITAMIN) tablet Take 1 tablet by mouth daily       order for DME [facility] [facility] By Rashmi Vicente  Phone [phone] Fax [phone]  Coolflex Wrap foot and leg measurements in CM  J:29/29  I:40/43  K:18/19  A: 32/37  C:62/66  Lateral Malleolus to Fibular Head: 48/45 2 Device 0     order for DME Flexiplus Pump  Please call patient to evaluate  "and treat  Cuong Clarke Fax [phone]  Include Facesheet and progress notes with limb measurements 1 Device 0     order for DME [facility] Phone [phone] Fax [phone]  Edemawear Size blue strip Qty 4 sleeves  edemawear size yellow strip 4 sleeves 6 Device 0     silver sulfADIAZINE (SILVADENE) 1 % external cream APPLY TO AFFECTED AREA TWICE A DAY 85 g 1       Allergies   Allergen Reactions     No Known Drug Allergies      Seasonal Allergies         Social History     Tobacco Use     Smoking status: Never Smoker     Smokeless tobacco: Never Used   Substance Use Topics     Alcohol use: Yes     Comment: rare     Family History   Problem Relation Age of Onset     Cancer Father      History   Drug Use No         Objective     /70   Pulse 70   Temp 98  F (36.7  C) (Oral)   Resp 18   Ht 1.93 m (6' 4\")   Wt (!) 193.7 kg (427 lb)   SpO2 99%   BMI 51.98 kg/m      Physical Exam    GENERAL APPEARANCE: alert, active, no distress and obese     EYES: EOMI,  PERRL     HENT: ear canals and TM's normal and nose and mouth without ulcers or lesions     NECK: no adenopathy, no asymmetry, masses, or scars and thyroid normal to palpation     RESP: lungs clear to auscultation - no rales, rhonchi or wheezes     CV: regular rates and rhythm, normal S1 S2, no S3 or S4 and no murmur, click or rub     ABDOMEN:  soft, nontender, no HSM or masses and bowel sounds normal     MS: extremities normal- no gross deformities noted, no evidence of inflammation in joints, FROM in all extremities.     SKIN: no suspicious lesions or rashes     NEURO: Normal strength and tone, sensory exam grossly normal, mentation intact and speech normal     PSYCH: mentation appears normal. and affect normal/bright     LYMPHATICS: No cervical adenopathy    Recent Labs   Lab Test 07/22/20  1536 07/15/20  0714 07/14/20  0652 07/07/20  0808 07/07/20  0808 07/06/20  1341 07/06/20  1341   HGB 11.4*  --  10.6*   < > 11.3*   < > 13.9    " 268 271   < > 263   < > 317   INR  --   --   --   --   --   --  1.10     --  138   < > 134   < > 131*   POTASSIUM 3.8  --  4.1   < > 3.2*   < > 3.1*   CR 0.74  --  0.79   < > 2.05*   < > 3.18*   A1C  --   --   --   --  5.5  --   --     < > = values in this interval not displayed.        Diagnostics:  Labs pending at this time.  Results will be reviewed when available.   No EKG required for low risk surgery (cataract, skin procedure, breast biopsy, etc).    Revised Cardiac Risk Index (RCRI):  The patient has the following serious cardiovascular risks for perioperative complications:   - No serious cardiac risks = 0 points     RCRI Interpretation: 0 points: Class I (very low risk - 0.4% complication rate)           Signed Electronically by: Robel Morris PA-C  Copy of this evaluation report is provided to requesting physician.

## 2021-03-19 NOTE — PATIENT INSTRUCTIONS

## 2021-03-25 ENCOUNTER — IMMUNIZATION (OUTPATIENT)
Dept: NURSING | Facility: CLINIC | Age: 61
End: 2021-03-25
Payer: COMMERCIAL

## 2021-03-25 PROCEDURE — 91303 PR COVID VAC JANSSEN AD26 0.5ML: CPT

## 2021-03-25 PROCEDURE — 0031A PR COVID VAC JANSSEN AD26 0.5ML: CPT

## 2021-04-26 ENCOUNTER — OFFICE VISIT (OUTPATIENT)
Dept: FAMILY MEDICINE | Facility: CLINIC | Age: 61
End: 2021-04-26
Payer: COMMERCIAL

## 2021-04-26 VITALS
OXYGEN SATURATION: 97 % | BODY MASS INDEX: 37.19 KG/M2 | HEIGHT: 77 IN | DIASTOLIC BLOOD PRESSURE: 77 MMHG | HEART RATE: 60 BPM | WEIGHT: 315 LBS | SYSTOLIC BLOOD PRESSURE: 137 MMHG | TEMPERATURE: 98.1 F

## 2021-04-26 DIAGNOSIS — H26.9 CATARACT OF BOTH EYES, UNSPECIFIED CATARACT TYPE: ICD-10-CM

## 2021-04-26 DIAGNOSIS — Z01.818 PREOP GENERAL PHYSICAL EXAM: Primary | ICD-10-CM

## 2021-04-26 PROCEDURE — 99214 OFFICE O/P EST MOD 30 MIN: CPT | Performed by: PHYSICIAN ASSISTANT

## 2021-04-26 RX ORDER — KETOROLAC TROMETHAMINE 5 MG/ML
SOLUTION OPHTHALMIC
COMMUNITY
Start: 2021-03-03

## 2021-04-26 RX ORDER — MULTIVITAMIN
1 TABLET ORAL
COMMUNITY

## 2021-04-26 RX ORDER — PREDNISOLONE ACETATE 10 MG/ML
SUSPENSION/ DROPS OPHTHALMIC
COMMUNITY
Start: 2021-03-03

## 2021-04-26 RX ORDER — GATIFLOXACIN 5 MG/ML
SOLUTION/ DROPS OPHTHALMIC
COMMUNITY
Start: 2021-03-03

## 2021-04-26 ASSESSMENT — MIFFLIN-ST. JEOR: SCORE: 2890.79

## 2021-04-26 NOTE — PATIENT INSTRUCTIONS

## 2021-04-26 NOTE — PROGRESS NOTES
[facility]  [id number] [place] [id number]  Phone: [phone]  Primary Provider: Atul Del Rosario  Pre-op Performing Provider: ATUL DEL ROSARIO      PREOPERATIVE EVALUATION:  Today's date: 4/26/2021    Da Mustafa is a 60 year old male who presents for a preoperative evaluation.    Surgical Information:  Surgery/Procedure: Cataract / right eye  Surgery Location: [facility]  Surgeon: Dr. Luz Marina Grace  Surgery Date: 4/30/21  Time of Surgery: 645 am  Where patient plans to recover: At home with family  Fax number for surgical facility: [place] [phone]    Type of Anesthesia Anticipated: to be determined    Assessment & Plan     The proposed surgical procedure is considered LOW risk.    Preop general physical exam      Cataract of both eyes, unspecified cataract type           Risks and Recommendations:  The patient has the following additional risks and recommendations for perioperative complications:   - Morbid obesity (BMI >40)    Medication Instructions:  Patient is to take all scheduled medications on the day of surgery    RECOMMENDATION:  APPROVAL GIVEN to proceed with proposed procedure, without further diagnostic evaluation.      Subjective     HPI related to upcoming procedure: history of cataract of both eyes. The above procedure was deemed the next best step in management    Preop Questions 4/24/2021   1. Have you ever had a heart attack or stroke? No   2. Have you ever had surgery on your heart or blood vessels, such as a stent placement, a coronary artery bypass, or surgery on an artery in your head, neck, heart, or legs? No   3. Do you have chest pain with activity? No   4. Do you have a history of  heart failure? No   5. Do you currently have a cold, bronchitis or symptoms of other infection? No   6. Do you have a cough, shortness of breath, or wheezing? No   7. Do you or anyone in your family have previous history of blood  clots? UNKNOWN -    8. Do you or does anyone in your family have a serious bleeding problem such as prolonged bleeding following surgeries or cuts? No   9. Have you ever had problems with anemia or been told to take iron pills? No   10. Have you had any abnormal blood loss such as black, tarry or bloody stools? No   11. Have you ever had a blood transfusion? No   12. Are you willing to have a blood transfusion if it is medically needed before, during, or after your surgery? Yes   13. Have you or any of your relatives ever had problems with anesthesia? UNKNOWN -    14. Do you have sleep apnea, excessive snoring or daytime drowsiness? YES -    14a. Do you have a CPAP machine? Yes   15. Do you have any artifical heart valves or other implanted medical devices like a pacemaker, defibrillator, or continuous glucose monitor? No   16. Do you have artificial joints? No   17. Are you allergic to latex? No       Health Care Directive:  Patient does not have a Health Care Directive or Living Will: Discussed advance care planning with patient; information given to patient to review. given at last visit.     Preoperative Review of :   reviewed - no record of controlled substances prescribed.      Status of Chronic Conditions:  See problem list for active medical problems.  Problems all longstanding and stable, except as noted/documented.  See ROS for pertinent symptoms related to these conditions.    HYPERTENSION - Patient has longstanding history of HTN , currently denies any symptoms referable to elevated blood pressure. Specifically denies chest pain, palpitations, dyspnea, orthopnea, PND or peripheral edema. Blood pressure readings have been in normal range. Current medication regimen is as listed below. Patient denies any side effects of medication.       Review of Systems  CONSTITUTIONAL: NEGATIVE for fever, chills, change in weight  INTEGUMENTARY/SKIN: NEGATIVE for worrisome rashes, moles or lesions  EYES: NEGATIVE  for vision changes or irritation  ENT/MOUTH: NEGATIVE for ear, mouth and throat problems  RESP: NEGATIVE for significant cough or SOB  CV: NEGATIVE for chest pain, palpitations or peripheral edema  GI: NEGATIVE for nausea, abdominal pain, heartburn, or change in bowel habits  : NEGATIVE for frequency, dysuria, or hematuria  MUSCULOSKELETAL: NEGATIVE for significant arthralgias or myalgia  NEURO: NEGATIVE for weakness, dizziness or paresthesias  ENDOCRINE: NEGATIVE for temperature intolerance, skin/hair changes  HEME: NEGATIVE for bleeding problems  PSYCHIATRIC: NEGATIVE for changes in mood or affect    Patient Active Problem List    Diagnosis Date Noted     Lymphedema of both lower extremities 07/29/2020     Priority: Medium     Severe sepsis (H) 07/06/2020     Priority: Medium     Morbid obesity with BMI of 50.0-59.9, adult (H) 04/24/2019     Priority: Medium     Venous stasis dermatitis of both lower extremities 07/03/2018     Priority: Medium     Peripheral edema 07/03/2018     Priority: Medium     Lymphedema 07/03/2018     Priority: Medium     Arthritis of left foot 12/27/2017     Priority: Medium     Class 3 obesity due to excess calories without serious comorbidity with body mass index (BMI) of 50.0 to 59.9 in adult 12/27/2017     Priority: Medium     Essential hypertension with goal blood pressure less than 140/90 07/25/2016     Priority: Medium     Hyperlipidemia LDL goal <160 07/28/2015     Priority: Medium     5.1% 10 yr risk 7/2015       Seasonal allergic rhinitis 05/19/2014     Priority: Medium     Family history of diabetes mellitus 05/05/2011     Priority: Medium     Edema 03/26/2006     Priority: Medium      Past Medical History:   Diagnosis Date     Class 3 obesity due to excess calories without serious comorbidity with body mass index (BMI) of 50.0 to 59.9 in adult 12/27/2017     Edema      Essential hypertension with goal blood pressure less than 140/90 7/25/2016     Essential hypertension,  benign      Hyperlipidemia LDL goal <160 7/28/2015     Hypertension goal BP (blood pressure) < 140/90 4/25/2013     Obesity, unspecified      Seasonal allergic rhinitis 5/19/2014     Sleep apnea      Past Surgical History:   Procedure Laterality Date     COLONOSCOPY  12/4/2013    Procedure: COLONOSCOPY;  Colonoscopy  ;  Surgeon: Beny Rich MD;  Location: [facility] NONSPECIFIC PROCEDURE       Current Outpatient Medications   Medication Sig Dispense Refill     gatifloxacin (ZYMAXID) 0.5 % ophthalmic solution        ketorolac (ACULAR) 0.5 % ophthalmic solution        prednisoLONE acetate (PREDNISOLONE ACETATE P-F) 1 % ophthalmic suspension        AFLURIA QUADRIVALENT 0.5 ML injection TO BE ADMINISTERED BY PHARMACIST FOR IMMUNIZATION       betamethasone dipropionate (DIPROSONE) 0.05 % external ointment Apply topically daily 100 g 3     Dietary Management Product (VASCULERA) TABS Take 1 tablet by mouth daily 90 tablet 3     fluticasone (FLONASE) 50 MCG/ACT nasal spray SPRAY 2 SPRAYS INTO BOTH NOSTRILS DAILY 16 mL 2     furosemide (LASIX) 40 MG tablet Take 1 tablet (40 mg) by mouth daily 90 tablet 1     garlic 150 MG TABS tablet Take 150 mg by mouth daily       glucosamine-chondroitin 500-400 MG CAPS per capsule Take 1 capsule by mouth daily       lisinopril (ZESTRIL) 40 MG tablet Take 1 tablet (40 mg) by mouth daily 90 tablet 1     loratadine (CLARITIN) 10 MG tablet Take 10 mg by mouth daily       multivitamin w/minerals (MULTI-VITAMIN) tablet Take 1 tablet by mouth daily       order for DME [facility] [employer] By Rashmi Vicente  Phone [phone] Fax [phone]  Coolflex Wrap foot and leg measurements in CM  J:29/29  I:40/43  K:18/19  A: 32/37  C:62/66  Lateral Malleolus to Fibular Head: 48/45 2 Device 0     order for DME [employer]  Please call patient to evaluate and treat  Cuong Clarke Fax [phone]  Include Facesheet and progress notes with limb measurements 1 Device 0     order for DME [facility]  "[facility] Phone [phone] Fax [phone]  Edem[place]ear Size blue strip Qty 4 sleeves  edemawear size yellow strip 4 sleeves 6 Device 0     silver sulfADIAZINE (SILVADENE) 1 % external cream APPLY TO AFFECTED AREA TWICE A DAY 85 g 1     Specialty Vitamins Products (VITAMINS FOR HAIR) TABS Take 1 tablet by mouth         Allergies   Allergen Reactions     No Known Drug Allergies      Seasonal Allergies         Social History     Tobacco Use     Smoking status: Never Smoker     Smokeless tobacco: Never Used   Substance Use Topics     Alcohol use: Yes     Comment: rare     Family History   Problem Relation Age of Onset     Cancer Father      History   Drug Use No         Objective     /77 (BP Location: Right arm, Patient Position: Sitting, Cuff Size: Adult Large)   Pulse 60   Temp 98.1  F (36.7  C) (Oral)   Ht 1.943 m (6' 4.5\")   Wt (!) 196.1 kg (432 lb 6.4 oz)   SpO2 97%   BMI 51.95 kg/m      Physical Exam    GENERAL APPEARANCE: alert, active and no distress     EYES: EOMI,  PERRL     HENT: ear canals and TM's normal and nose and mouth without ulcers or lesions     NECK: no adenopathy, no asymmetry, masses, or scars and thyroid normal to palpation     RESP: lungs clear to auscultation - no rales, rhonchi or wheezes     CV: regular rates and rhythm, normal S1 S2, no S3 or S4 and no murmur, click or rub     ABDOMEN:  soft, nontender, no HSM or masses and bowel sounds normal     MS: extremities normal- no gross deformities noted, no evidence of inflammation in joints, FROM in all extremities.     SKIN: no suspicious lesions or rashes     NEURO: Normal strength and tone, sensory exam grossly normal, mentation intact and speech normal     PSYCH: mentation appears normal. and affect normal/bright     LYMPHATICS: No cervical adenopathy    Recent Labs   Lab Test 03/19/21  0825 07/22/20  1536 07/15/20  0714 07/14/20  0652 07/07/20  0808 07/07/20  0808 07/06/20  1341 07/06/20  1341   HGB  --  11.4*  --  10.6*   < " > 11.3*   < > 13.9   PLT  --  284 268 271   < > 263   < > 317   INR  --   --   --   --   --   --   --  1.10    136  --  138   < > 134   < > 131*   POTASSIUM 3.9 3.8  --  4.1   < > 3.2*   < > 3.1*   CR 0.79 0.74  --  0.79   < > 2.05*   < > 3.18*   A1C  --   --   --   --   --  5.5  --   --     < > = values in this interval not displayed.        Diagnostics:  No labs were ordered during this visit.   No EKG required for low risk surgery (cataract, skin procedure, breast biopsy, etc).    Revised Cardiac Risk Index (RCRI):  The patient has the following serious cardiovascular risks for perioperative complications:   - No serious cardiac risks = 0 points     RCRI Interpretation: 0 points: Class I (very low risk - 0.4% complication rate)           Signed Electronically by: Robel Morris PA-C  Copy of this evaluation report is provided to requesting physician.

## 2021-07-16 DIAGNOSIS — J30.2 SEASONAL ALLERGIC RHINITIS, UNSPECIFIED TRIGGER: ICD-10-CM

## 2021-07-16 RX ORDER — FLUTICASONE PROPIONATE 50 MCG
SPRAY, SUSPENSION (ML) NASAL
Qty: 48 ML | Refills: 1 | Status: SHIPPED | OUTPATIENT
Start: 2021-07-16 | End: 2022-01-17

## 2021-09-17 DIAGNOSIS — R60.0 PERIPHERAL EDEMA: ICD-10-CM

## 2021-09-17 DIAGNOSIS — I10 ESSENTIAL HYPERTENSION WITH GOAL BLOOD PRESSURE LESS THAN 140/90: ICD-10-CM

## 2021-09-17 RX ORDER — FUROSEMIDE 40 MG
TABLET ORAL
Qty: 90 TABLET | Refills: 1 | Status: SHIPPED | OUTPATIENT
Start: 2021-09-17 | End: 2022-05-24

## 2021-09-17 RX ORDER — LISINOPRIL 40 MG/1
TABLET ORAL
Qty: 90 TABLET | Refills: 1 | Status: SHIPPED | OUTPATIENT
Start: 2021-09-17 | End: 2022-05-24

## 2021-09-17 NOTE — TELEPHONE ENCOUNTER
Routing refill request to provider for review/approval because:  Do you want 6 month visit or BMP now? Ok to refill another 6 months?  Advise refills, route to TC for appointment if needed  Michelle Garrido RN, BSN  Message handled by CLINIC NURSE.

## 2021-09-19 ENCOUNTER — HEALTH MAINTENANCE LETTER (OUTPATIENT)
Age: 61
End: 2021-09-19

## 2022-01-09 ENCOUNTER — HEALTH MAINTENANCE LETTER (OUTPATIENT)
Age: 62
End: 2022-01-09

## 2022-05-23 DIAGNOSIS — R60.0 PERIPHERAL EDEMA: ICD-10-CM

## 2022-05-23 DIAGNOSIS — I10 ESSENTIAL HYPERTENSION WITH GOAL BLOOD PRESSURE LESS THAN 140/90: ICD-10-CM

## 2022-05-23 NOTE — LETTER
May 26, 2022      Da Mustafa  [address] [place] [id number]      Dear Da,    We recently received a call from your pharmacy requesting a refill of your medication.    A review of your chart indicates that an appointment is required with your provider.  Please call the clinic to schedule your appointment.    We have authorized one refill of your medication to allow time for you to schedule.   If you have a history of diabetes or high cholesterol, please come in fasting for the appointment. Fasting entails nothing to eat or drink 8 hours prior to your appointment; with the exception on water. You may take your medication the day of the appointment.    Thank you,      Robel Morris PA-C

## 2022-05-24 RX ORDER — FUROSEMIDE 40 MG
TABLET ORAL
Qty: 90 TABLET | Refills: 0 | Status: SHIPPED | OUTPATIENT
Start: 2022-05-24

## 2022-05-24 RX ORDER — LISINOPRIL 40 MG/1
TABLET ORAL
Qty: 90 TABLET | Refills: 0 | Status: SHIPPED | OUTPATIENT
Start: 2022-05-24

## 2022-05-25 NOTE — TELEPHONE ENCOUNTER
[facility] for patient to call the clinic.  Aston Taylor on 5/25/2022 at 10:06 AM

## 2022-11-21 ENCOUNTER — HEALTH MAINTENANCE LETTER (OUTPATIENT)
Age: 62
End: 2022-11-21

## 2023-04-16 ENCOUNTER — HEALTH MAINTENANCE LETTER (OUTPATIENT)
Age: 63
End: 2023-04-16

## 2024-06-22 ENCOUNTER — HEALTH MAINTENANCE LETTER (OUTPATIENT)
Age: 64
End: 2024-06-22